# Patient Record
Sex: MALE | Race: BLACK OR AFRICAN AMERICAN | NOT HISPANIC OR LATINO | Employment: FULL TIME | URBAN - METROPOLITAN AREA
[De-identification: names, ages, dates, MRNs, and addresses within clinical notes are randomized per-mention and may not be internally consistent; named-entity substitution may affect disease eponyms.]

---

## 2017-02-07 DIAGNOSIS — N52.9 ERECTILE DYSFUNCTION, UNSPECIFIED ERECTILE DYSFUNCTION TYPE: ICD-10-CM

## 2017-02-07 DIAGNOSIS — E11.9 DIABETES MELLITUS WITHOUT COMPLICATION: ICD-10-CM

## 2017-02-07 DIAGNOSIS — I10 ESSENTIAL HYPERTENSION, BENIGN: Primary | ICD-10-CM

## 2017-02-09 RX ORDER — METFORMIN HYDROCHLORIDE 1000 MG/1
TABLET ORAL
Qty: 60 TABLET | Refills: 0 | Status: SHIPPED | OUTPATIENT
Start: 2017-02-09 | End: 2017-02-15 | Stop reason: SDUPTHER

## 2017-02-09 RX ORDER — AMLODIPINE BESYLATE 10 MG/1
10 TABLET ORAL DAILY
Qty: 30 TABLET | Refills: 0 | Status: SHIPPED | OUTPATIENT
Start: 2017-02-09 | End: 2017-02-15 | Stop reason: SDUPTHER

## 2017-02-09 RX ORDER — TADALAFIL 20 MG/1
20 TABLET ORAL DAILY
Qty: 20 TABLET | Refills: 0 | Status: SHIPPED | OUTPATIENT
Start: 2017-02-09 | End: 2017-02-15 | Stop reason: SDUPTHER

## 2017-02-14 RX ORDER — BENZONATATE 200 MG/1
CAPSULE ORAL
COMMUNITY
Start: 2017-02-07 | End: 2017-02-15

## 2017-02-15 ENCOUNTER — OFFICE VISIT (OUTPATIENT)
Dept: INTERNAL MEDICINE | Facility: CLINIC | Age: 57
End: 2017-02-15
Payer: COMMERCIAL

## 2017-02-15 VITALS
HEART RATE: 98 BPM | DIASTOLIC BLOOD PRESSURE: 84 MMHG | BODY MASS INDEX: 31.85 KG/M2 | HEIGHT: 70 IN | TEMPERATURE: 99 F | SYSTOLIC BLOOD PRESSURE: 120 MMHG | WEIGHT: 222.44 LBS

## 2017-02-15 DIAGNOSIS — N52.9 ERECTILE DYSFUNCTION, UNSPECIFIED ERECTILE DYSFUNCTION TYPE: ICD-10-CM

## 2017-02-15 DIAGNOSIS — E11.9 DIABETES MELLITUS WITHOUT COMPLICATION: ICD-10-CM

## 2017-02-15 DIAGNOSIS — J01.10 ACUTE NON-RECURRENT FRONTAL SINUSITIS: Primary | ICD-10-CM

## 2017-02-15 DIAGNOSIS — I10 ESSENTIAL HYPERTENSION, BENIGN: ICD-10-CM

## 2017-02-15 PROCEDURE — 99214 OFFICE O/P EST MOD 30 MIN: CPT | Mod: S$GLB,,, | Performed by: INTERNAL MEDICINE

## 2017-02-15 PROCEDURE — 99999 PR PBB SHADOW E&M-EST. PATIENT-LVL III: CPT | Mod: PBBFAC,,, | Performed by: INTERNAL MEDICINE

## 2017-02-15 PROCEDURE — 3045F PR MOST RECENT HEMOGLOBIN A1C LEVEL 7.0-9.0%: CPT | Mod: S$GLB,,, | Performed by: INTERNAL MEDICINE

## 2017-02-15 PROCEDURE — 3074F SYST BP LT 130 MM HG: CPT | Mod: S$GLB,,, | Performed by: INTERNAL MEDICINE

## 2017-02-15 PROCEDURE — 3060F POS MICROALBUMINURIA REV: CPT | Mod: S$GLB,,, | Performed by: INTERNAL MEDICINE

## 2017-02-15 PROCEDURE — 3079F DIAST BP 80-89 MM HG: CPT | Mod: S$GLB,,, | Performed by: INTERNAL MEDICINE

## 2017-02-15 RX ORDER — TADALAFIL 20 MG/1
20 TABLET ORAL EVERY OTHER DAY
Qty: 20 TABLET | Refills: 12 | Status: SHIPPED | OUTPATIENT
Start: 2017-02-15 | End: 2021-04-21

## 2017-02-15 RX ORDER — AMLODIPINE BESYLATE 10 MG/1
10 TABLET ORAL DAILY
Qty: 90 TABLET | Refills: 3 | Status: SHIPPED | OUTPATIENT
Start: 2017-02-15 | End: 2018-03-16 | Stop reason: SDUPTHER

## 2017-02-15 RX ORDER — METFORMIN HYDROCHLORIDE 1000 MG/1
TABLET ORAL
Qty: 180 TABLET | Refills: 3 | Status: SHIPPED | OUTPATIENT
Start: 2017-02-15 | End: 2018-03-16 | Stop reason: SDUPTHER

## 2017-02-15 RX ORDER — AMOXICILLIN 875 MG/1
875 TABLET, FILM COATED ORAL 2 TIMES DAILY
Qty: 20 TABLET | Refills: 0 | Status: SHIPPED | OUTPATIENT
Start: 2017-02-15 | End: 2017-02-25

## 2017-02-20 NOTE — PROGRESS NOTES
Subjective:       Patient ID: Michael Slaughter Jr. is a 56 y.o. male.    Chief Complaint: Annual Exam; sneezing; Cough; and Dizziness    Cough   This is a new problem. The current episode started 1 to 4 weeks ago. The problem has been waxing and waning. The problem occurs constantly. The cough is productive of sputum. Associated symptoms include nasal congestion, postnasal drip and rhinorrhea. Pertinent negatives include no shortness of breath or wheezing.   Diabetes   He presents for his follow-up diabetic visit. He has type 2 diabetes mellitus. His disease course has been stable. There are no hypoglycemic associated symptoms. There are no diabetic associated symptoms. Symptoms are stable.     Review of Systems   HENT: Positive for postnasal drip and rhinorrhea.    Respiratory: Positive for cough. Negative for shortness of breath and wheezing.        Objective:      Physical Exam   Constitutional: He is oriented to person, place, and time. He appears well-developed and well-nourished. No distress.   HENT:   Head: Normocephalic and atraumatic.   Mouth/Throat: Posterior oropharyngeal edema and posterior oropharyngeal erythema present.   Eyes: Conjunctivae are normal. Pupils are equal, round, and reactive to light.   Neck: Normal range of motion. Neck supple.   Cardiovascular: Normal rate, regular rhythm and normal heart sounds.    Pulmonary/Chest: Effort normal and breath sounds normal. He has no wheezes.   Abdominal: Soft. Bowel sounds are normal. There is no tenderness.   Musculoskeletal: Normal range of motion. He exhibits no edema or tenderness.   Neurological: He is alert and oriented to person, place, and time. No cranial nerve deficit.   Skin: No erythema.   Psychiatric: He has a normal mood and affect.   Vitals reviewed.      Assessment:       1. Acute non-recurrent frontal sinusitis    2. Essential hypertension, benign    3. Diabetes mellitus without complication    4. Erectile dysfunction, unspecified  erectile dysfunction type        Plan:       Michael was seen today for annual exam, sneezing, cough and dizziness.    Diagnoses and all orders for this visit:    Acute non-recurrent frontal sinusitis  -     amoxicillin (AMOXIL) 875 MG tablet; Take 1 tablet (875 mg total) by mouth 2 (two) times daily.    Essential hypertension, benign  -     amlodipine (NORVASC) 10 MG tablet; Take 1 tablet (10 mg total) by mouth once daily.    Diabetes mellitus without complication  -     metformin (GLUCOPHAGE) 1000 MG tablet; TAKE ONE TABLET TWICE DAILY WITH MEALS  -     CBC auto differential; Future  -     Comprehensive metabolic panel; Future  -     Hemoglobin A1c; Future  -     Lipid panel; Future    Erectile dysfunction, unspecified erectile dysfunction type  -     tadalafil (CIALIS) 20 MG Tab; Take 1 tablet (20 mg total) by mouth every other day.        Return in about 4 months (around 6/15/2017) for F/U APPOINTMENT WITH ME, WITH LAB BEFORE.

## 2017-06-19 DIAGNOSIS — E11.9 TYPE 2 DIABETES MELLITUS WITHOUT COMPLICATION: ICD-10-CM

## 2017-08-15 ENCOUNTER — LAB VISIT (OUTPATIENT)
Dept: LAB | Facility: HOSPITAL | Age: 57
End: 2017-08-15
Attending: INTERNAL MEDICINE
Payer: COMMERCIAL

## 2017-08-15 DIAGNOSIS — E11.9 DIABETES MELLITUS WITHOUT COMPLICATION: ICD-10-CM

## 2017-08-15 DIAGNOSIS — I10 ESSENTIAL HYPERTENSION, BENIGN: ICD-10-CM

## 2017-08-15 LAB
ALBUMIN SERPL BCP-MCNC: 3.7 G/DL
ALP SERPL-CCNC: 51 U/L
ALT SERPL W/O P-5'-P-CCNC: 24 U/L
ANION GAP SERPL CALC-SCNC: 9 MMOL/L
ANION GAP SERPL CALC-SCNC: 9 MMOL/L
AST SERPL-CCNC: 14 U/L
BASOPHILS # BLD AUTO: 0.03 K/UL
BASOPHILS NFR BLD: 0.4 %
BILIRUB SERPL-MCNC: 0.9 MG/DL
BUN SERPL-MCNC: 15 MG/DL
BUN SERPL-MCNC: 15 MG/DL
CALCIUM SERPL-MCNC: 8.7 MG/DL
CALCIUM SERPL-MCNC: 8.7 MG/DL
CHLORIDE SERPL-SCNC: 103 MMOL/L
CHLORIDE SERPL-SCNC: 103 MMOL/L
CHOLEST/HDLC SERPL: 4.7 {RATIO}
CO2 SERPL-SCNC: 26 MMOL/L
CO2 SERPL-SCNC: 26 MMOL/L
CREAT SERPL-MCNC: 1 MG/DL
CREAT SERPL-MCNC: 1 MG/DL
DIFFERENTIAL METHOD: ABNORMAL
EOSINOPHIL # BLD AUTO: 0.2 K/UL
EOSINOPHIL NFR BLD: 2.5 %
ERYTHROCYTE [DISTWIDTH] IN BLOOD BY AUTOMATED COUNT: 14.1 %
EST. GFR  (AFRICAN AMERICAN): >60 ML/MIN/1.73 M^2
EST. GFR  (AFRICAN AMERICAN): >60 ML/MIN/1.73 M^2
EST. GFR  (NON AFRICAN AMERICAN): >60 ML/MIN/1.73 M^2
EST. GFR  (NON AFRICAN AMERICAN): >60 ML/MIN/1.73 M^2
ESTIMATED AVG GLUCOSE: 206 MG/DL
ESTIMATED AVG GLUCOSE: 206 MG/DL
GLUCOSE SERPL-MCNC: 204 MG/DL
GLUCOSE SERPL-MCNC: 204 MG/DL
HBA1C MFR BLD HPLC: 8.8 %
HBA1C MFR BLD HPLC: 8.8 %
HCT VFR BLD AUTO: 43.6 %
HDL/CHOLESTEROL RATIO: 21.5 %
HDLC SERPL-MCNC: 214 MG/DL
HDLC SERPL-MCNC: 46 MG/DL
HGB BLD-MCNC: 15.1 G/DL
LDLC SERPL CALC-MCNC: 129.8 MG/DL
LYMPHOCYTES # BLD AUTO: 1.8 K/UL
LYMPHOCYTES NFR BLD: 26.9 %
MCH RBC QN AUTO: 27 PG
MCHC RBC AUTO-ENTMCNC: 34.6 G/DL
MCV RBC AUTO: 78 FL
MONOCYTES # BLD AUTO: 0.6 K/UL
MONOCYTES NFR BLD: 9.1 %
NEUTROPHILS # BLD AUTO: 4.1 K/UL
NEUTROPHILS NFR BLD: 61 %
NONHDLC SERPL-MCNC: 168 MG/DL
PLATELET # BLD AUTO: 245 K/UL
PMV BLD AUTO: 11.9 FL
POTASSIUM SERPL-SCNC: 4.1 MMOL/L
POTASSIUM SERPL-SCNC: 4.1 MMOL/L
PROT SERPL-MCNC: 7 G/DL
RBC # BLD AUTO: 5.6 M/UL
SODIUM SERPL-SCNC: 138 MMOL/L
SODIUM SERPL-SCNC: 138 MMOL/L
TRIGL SERPL-MCNC: 191 MG/DL
WBC # BLD AUTO: 6.74 K/UL

## 2017-08-15 PROCEDURE — 80053 COMPREHEN METABOLIC PANEL: CPT

## 2017-08-15 PROCEDURE — 36415 COLL VENOUS BLD VENIPUNCTURE: CPT

## 2017-08-15 PROCEDURE — 80061 LIPID PANEL: CPT

## 2017-08-15 PROCEDURE — 85025 COMPLETE CBC W/AUTO DIFF WBC: CPT

## 2017-08-15 PROCEDURE — 83036 HEMOGLOBIN GLYCOSYLATED A1C: CPT

## 2017-08-25 ENCOUNTER — PATIENT OUTREACH (OUTPATIENT)
Dept: ADMINISTRATIVE | Facility: HOSPITAL | Age: 57
End: 2017-08-25

## 2017-08-25 DIAGNOSIS — Z23 NEED FOR STREPTOCOCCUS PNEUMONIAE VACCINATION: ICD-10-CM

## 2017-08-25 DIAGNOSIS — E11.9 TYPE 2 DIABETES MELLITUS WITHOUT COMPLICATION, WITHOUT LONG-TERM CURRENT USE OF INSULIN: Primary | ICD-10-CM

## 2017-08-25 NOTE — PROGRESS NOTES
LPN CCC attempted to contact patient to coordinate scheduling an eye exam visit. VM option received. LPN, CCC provided a message, which included purpose of contact, call back request, and the LPN, CCC's name and contact information.

## 2017-08-28 ENCOUNTER — OFFICE VISIT (OUTPATIENT)
Dept: INTERNAL MEDICINE | Facility: CLINIC | Age: 57
End: 2017-08-28
Payer: COMMERCIAL

## 2017-08-28 ENCOUNTER — TELEPHONE (OUTPATIENT)
Dept: INTERNAL MEDICINE | Facility: CLINIC | Age: 57
End: 2017-08-28

## 2017-08-28 VITALS
HEIGHT: 70 IN | WEIGHT: 226.19 LBS | HEART RATE: 64 BPM | BODY MASS INDEX: 32.38 KG/M2 | DIASTOLIC BLOOD PRESSURE: 88 MMHG | SYSTOLIC BLOOD PRESSURE: 124 MMHG

## 2017-08-28 DIAGNOSIS — E78.2 MIXED HYPERLIPIDEMIA: ICD-10-CM

## 2017-08-28 DIAGNOSIS — E78.5 HYPERLIPIDEMIA, UNSPECIFIED HYPERLIPIDEMIA TYPE: Primary | ICD-10-CM

## 2017-08-28 DIAGNOSIS — E11.9 DIABETES MELLITUS WITHOUT COMPLICATION: Primary | ICD-10-CM

## 2017-08-28 DIAGNOSIS — E11.9 TYPE 2 DIABETES MELLITUS WITHOUT COMPLICATION, WITHOUT LONG-TERM CURRENT USE OF INSULIN: ICD-10-CM

## 2017-08-28 PROCEDURE — 3008F BODY MASS INDEX DOCD: CPT | Mod: S$GLB,,, | Performed by: INTERNAL MEDICINE

## 2017-08-28 PROCEDURE — 99215 OFFICE O/P EST HI 40 MIN: CPT | Mod: S$GLB,,, | Performed by: INTERNAL MEDICINE

## 2017-08-28 PROCEDURE — 99999 PR PBB SHADOW E&M-EST. PATIENT-LVL III: CPT | Mod: PBBFAC,,, | Performed by: INTERNAL MEDICINE

## 2017-08-28 PROCEDURE — 3074F SYST BP LT 130 MM HG: CPT | Mod: S$GLB,,, | Performed by: INTERNAL MEDICINE

## 2017-08-28 PROCEDURE — 3079F DIAST BP 80-89 MM HG: CPT | Mod: S$GLB,,, | Performed by: INTERNAL MEDICINE

## 2017-08-28 PROCEDURE — 82270 OCCULT BLOOD FECES: CPT | Mod: S$GLB,,, | Performed by: INTERNAL MEDICINE

## 2017-08-28 PROCEDURE — 3045F PR MOST RECENT HEMOGLOBIN A1C LEVEL 7.0-9.0%: CPT | Mod: S$GLB,,, | Performed by: INTERNAL MEDICINE

## 2017-08-28 RX ORDER — ATORVASTATIN CALCIUM 10 MG/1
10 TABLET, FILM COATED ORAL DAILY
Qty: 30 TABLET | Refills: 11 | Status: SHIPPED | OUTPATIENT
Start: 2017-08-28 | End: 2018-08-29 | Stop reason: SDUPTHER

## 2017-08-29 NOTE — PROGRESS NOTES
Subjective:       Patient ID: Michael Slaughter Jr. is a 57 y.o. male.    Chief Complaint: Diabetes; Arm Pain (itches and discoloration); Cough (after eating); and Ear Fullness    Diabetes   He presents for his follow-up diabetic visit. He has type 2 diabetes mellitus. His disease course has been stable. There are no hypoglycemic associated symptoms. Pertinent negatives for diabetes include no chest pain, no polydipsia, no polyphagia and no polyuria. Symptoms are stable. There are no diabetic complications. Current diabetic treatment includes oral agent (monotherapy). He is compliant with treatment most of the time.   Arm Pain    Pertinent negatives include no chest pain.   Cough   Pertinent negatives include no chest pain.   Ear Fullness    Associated symptoms include coughing.     Review of Systems   HENT:        Right ear muffled   Respiratory: Positive for cough.    Cardiovascular: Negative for chest pain.   Endocrine: Negative for polydipsia, polyphagia and polyuria.       Objective:      Physical Exam   Constitutional: He is oriented to person, place, and time. He appears well-developed and well-nourished. No distress.   HENT:   Head: Normocephalic and atraumatic.   Right Ear: Hearing and tympanic membrane normal.   Ears:    Mouth/Throat: Oropharynx is clear and moist.   Eyes: Conjunctivae are normal. Pupils are equal, round, and reactive to light.   Neck: Normal range of motion. Neck supple.   Cardiovascular: Normal rate, regular rhythm and normal heart sounds.    Pulmonary/Chest: Effort normal and breath sounds normal. He has no wheezes.   Abdominal: Soft. Bowel sounds are normal. There is no tenderness.   Musculoskeletal: Normal range of motion. He exhibits no edema or tenderness.   Neurological: He is alert and oriented to person, place, and time. No cranial nerve deficit.   Skin: No erythema.   Psychiatric: He has a normal mood and affect.   Vitals reviewed.      Assessment:       1. Hyperlipidemia,  unspecified hyperlipidemia type    2. Type 2 diabetes mellitus without complication, without long-term current use of insulin        Plan:       Michael was seen today for diabetes, arm pain, cough and ear fullness.    Diagnoses and all orders for this visit:    Hyperlipidemia, unspecified hyperlipidemia type  -     atorvastatin (LIPITOR) 10 MG tablet; Take 1 tablet (10 mg total) by mouth once daily.  -     Comprehensive metabolic panel; Future    Type 2 diabetes mellitus without complication, without long-term current use of insulin  -     Comprehensive metabolic panel; Future        Return in about 3 months (around 11/28/2017) for F/U APPOINTMENT WITH ME, WITH LAB BEFORE.

## 2017-11-14 ENCOUNTER — LAB VISIT (OUTPATIENT)
Dept: LAB | Facility: HOSPITAL | Age: 57
End: 2017-11-14
Payer: COMMERCIAL

## 2017-11-14 DIAGNOSIS — E78.2 MIXED HYPERLIPIDEMIA: ICD-10-CM

## 2017-11-14 DIAGNOSIS — E11.9 DIABETES MELLITUS WITHOUT COMPLICATION: ICD-10-CM

## 2017-11-14 LAB
ALBUMIN SERPL BCP-MCNC: 3.9 G/DL
ALP SERPL-CCNC: 62 U/L
ALT SERPL W/O P-5'-P-CCNC: 41 U/L
AST SERPL-CCNC: 18 U/L
BILIRUB DIRECT SERPL-MCNC: 0.4 MG/DL
BILIRUB SERPL-MCNC: 0.9 MG/DL
CHOLEST SERPL-MCNC: 148 MG/DL
CHOLEST/HDLC SERPL: 3.4 {RATIO}
ESTIMATED AVG GLUCOSE: 246 MG/DL
HBA1C MFR BLD HPLC: 10.2 %
HDLC SERPL-MCNC: 43 MG/DL
HDLC SERPL: 29.1 %
LDLC SERPL CALC-MCNC: 72.2 MG/DL
NONHDLC SERPL-MCNC: 105 MG/DL
PROT SERPL-MCNC: 7.2 G/DL
TRIGL SERPL-MCNC: 164 MG/DL

## 2017-11-14 PROCEDURE — 83036 HEMOGLOBIN GLYCOSYLATED A1C: CPT

## 2017-11-14 PROCEDURE — 80076 HEPATIC FUNCTION PANEL: CPT

## 2017-11-14 PROCEDURE — 80061 LIPID PANEL: CPT

## 2017-11-14 PROCEDURE — 36415 COLL VENOUS BLD VENIPUNCTURE: CPT

## 2017-11-21 ENCOUNTER — HOSPITAL ENCOUNTER (OUTPATIENT)
Dept: RADIOLOGY | Facility: HOSPITAL | Age: 57
Discharge: HOME | End: 2017-11-21
Attending: INTERNAL MEDICINE
Payer: COMMERCIAL

## 2017-11-21 ENCOUNTER — IMMUNIZATION (OUTPATIENT)
Dept: INTERNAL MEDICINE | Facility: CLINIC | Age: 57
End: 2017-11-21
Payer: COMMERCIAL

## 2017-11-21 ENCOUNTER — OFFICE VISIT (OUTPATIENT)
Dept: INTERNAL MEDICINE | Facility: CLINIC | Age: 57
End: 2017-11-21
Payer: COMMERCIAL

## 2017-11-21 VITALS
WEIGHT: 227.5 LBS | DIASTOLIC BLOOD PRESSURE: 82 MMHG | HEIGHT: 71 IN | BODY MASS INDEX: 31.85 KG/M2 | HEART RATE: 88 BPM | SYSTOLIC BLOOD PRESSURE: 137 MMHG

## 2017-11-21 DIAGNOSIS — R07.1 CHEST PAIN ON BREATHING: ICD-10-CM

## 2017-11-21 DIAGNOSIS — I10 ESSENTIAL HYPERTENSION: ICD-10-CM

## 2017-11-21 DIAGNOSIS — E11.9 TYPE 2 DIABETES MELLITUS WITHOUT COMPLICATION, WITHOUT LONG-TERM CURRENT USE OF INSULIN: Primary | ICD-10-CM

## 2017-11-21 PROCEDURE — 93010 ELECTROCARDIOGRAM REPORT: CPT | Mod: S$GLB,,, | Performed by: INTERNAL MEDICINE

## 2017-11-21 PROCEDURE — 93005 ELECTROCARDIOGRAM TRACING: CPT | Mod: S$GLB,,, | Performed by: INTERNAL MEDICINE

## 2017-11-21 PROCEDURE — 90686 IIV4 VACC NO PRSV 0.5 ML IM: CPT | Mod: S$GLB,,, | Performed by: INTERNAL MEDICINE

## 2017-11-21 PROCEDURE — 99999 PR PBB SHADOW E&M-EST. PATIENT-LVL III: CPT | Mod: PBBFAC,,, | Performed by: INTERNAL MEDICINE

## 2017-11-21 PROCEDURE — 90471 IMMUNIZATION ADMIN: CPT | Mod: S$GLB,,, | Performed by: INTERNAL MEDICINE

## 2017-11-21 PROCEDURE — 71020 XR CHEST PA AND LATERAL: CPT | Mod: TC

## 2017-11-21 PROCEDURE — 71020 XR CHEST PA AND LATERAL: CPT | Mod: 26,,, | Performed by: RADIOLOGY

## 2017-11-21 PROCEDURE — 99214 OFFICE O/P EST MOD 30 MIN: CPT | Mod: 25,S$GLB,, | Performed by: INTERNAL MEDICINE

## 2017-11-21 NOTE — PROGRESS NOTES
Subjective:       Patient ID: Michael Slaughter Jr. is a 57 y.o. male.    Chief Complaint: Follow-up    Diabetes   He presents for his follow-up diabetic visit. He has type 2 diabetes mellitus. His disease course has been worsening. There are no hypoglycemic associated symptoms. Associated symptoms include polydipsia and polyuria. There are no hypoglycemic complications. Symptoms are worsening. Current diabetic treatment includes oral agent (monotherapy). He is compliant with treatment some of the time.   Hyperlipidemia   This is a chronic problem. The problem is controlled. Recent lipid tests were reviewed and are normal. Current antihyperlipidemic treatment includes statins. The current treatment provides significant improvement of lipids.     Review of Systems   Respiratory: Positive for cough and chest tightness (right side pleuritic pain).    Cardiovascular: Negative for palpitations and leg swelling.   Endocrine: Positive for polydipsia and polyuria.       Objective:      Physical Exam   Constitutional: He is oriented to person, place, and time. He appears well-developed and well-nourished. No distress.   HENT:   Head: Normocephalic and atraumatic.   Mouth/Throat: Oropharynx is clear and moist.   Eyes: Conjunctivae are normal. Pupils are equal, round, and reactive to light.   Neck: Normal range of motion. Neck supple.   Cardiovascular: Normal rate, regular rhythm and normal heart sounds.    Pulmonary/Chest: Effort normal and breath sounds normal. He has no wheezes.   Abdominal: Soft. Bowel sounds are normal. There is no tenderness.   Musculoskeletal: Normal range of motion. He exhibits no edema or tenderness.   Neurological: He is alert and oriented to person, place, and time. No cranial nerve deficit.   Skin: No erythema.   Psychiatric: He has a normal mood and affect.   Vitals reviewed.      Assessment:       1. Type 2 diabetes mellitus without complication, without long-term current use of insulin    2. Chest  pain on breathing    3. Essential hypertension        Plan:       Michael was seen today for follow-up.    Diagnoses and all orders for this visit:    Type 2 diabetes mellitus without complication, without long-term current use of insulin  -     Discontinue: SITagliptin (JANUVIA) 50 MG Tab; Take 1 tablet (50 mg total) by mouth once daily.  -     Basic metabolic panel; Future  -     Hemoglobin A1c; Future    Chest pain on breathing  -     X-Ray Chest PA And Lateral; Future  -     EKG 12-lead; Future  -     EKG 12-lead    Essential hypertension        Return in about 3 months (around 2/21/2018) for F/U APPOINTMENT WITH ME, WITH LAB BEFORE.

## 2017-11-27 ENCOUNTER — TELEPHONE (OUTPATIENT)
Dept: INTERNAL MEDICINE | Facility: CLINIC | Age: 57
End: 2017-11-27

## 2017-11-27 RX ORDER — ATORVASTATIN CALCIUM 10 MG/1
10 TABLET, FILM COATED ORAL DAILY
Refills: 11 | COMMUNITY
Start: 2017-11-17 | End: 2017-11-27 | Stop reason: SDUPTHER

## 2017-11-27 RX ORDER — GLIMEPIRIDE 1 MG/1
1 TABLET ORAL
Qty: 90 TABLET | Refills: 3 | Status: SHIPPED | OUTPATIENT
Start: 2017-11-27 | End: 2018-07-16 | Stop reason: SDUPTHER

## 2017-11-27 RX ORDER — ATORVASTATIN CALCIUM 10 MG/1
TABLET, FILM COATED ORAL
COMMUNITY
Start: 2017-11-17 | End: 2017-11-27 | Stop reason: SDUPTHER

## 2017-11-27 NOTE — TELEPHONE ENCOUNTER
----- Message from Ericka Hawthorne sent at 11/27/2017  9:18 AM CST -----  Contact: Mi [phone]  Mi with [facility] states the pt insurance does not cover JANUVIA and Tradjenta and she would like to know if the Dr would like to change the medicine or do a prior auth,please call

## 2017-12-18 DIAGNOSIS — Z79.4 TYPE 2 DIABETES MELLITUS WITHOUT COMPLICATION, WITH LONG-TERM CURRENT USE OF INSULIN: ICD-10-CM

## 2017-12-18 DIAGNOSIS — E11.9 TYPE 2 DIABETES MELLITUS WITHOUT COMPLICATION, WITH LONG-TERM CURRENT USE OF INSULIN: ICD-10-CM

## 2017-12-18 RX ORDER — BLOOD SUGAR DIAGNOSTIC
1 STRIP MISCELLANEOUS DAILY
Qty: 50 STRIP | Refills: 6 | Status: SHIPPED | OUTPATIENT
Start: 2017-12-18 | End: 2017-12-21

## 2017-12-18 NOTE — TELEPHONE ENCOUNTER
----- Message from Lindsay Lake sent at 12/16/2017  9:27 AM CST -----  Contact: Pt  Pt needs a refill on medication     [employer] TEST STRIP Strip  Catalog number [id number]    Pt can be reached at [phone] thanks

## 2017-12-18 NOTE — TELEPHONE ENCOUNTER
----- Message from Juan Dennison sent at 12/18/2017 12:15 PM CST -----  Contact: patient  Patient called in requesting to get more of his test strips called [employer] and the catalog number is [id number]. If there is further information needed please contact patient at [phone]. Thank You.

## 2017-12-21 RX ORDER — ATORVASTATIN CALCIUM 10 MG/1
10 TABLET, FILM COATED ORAL DAILY
Refills: 11 | COMMUNITY
Start: 2017-12-14 | End: 2019-09-01 | Stop reason: SDUPTHER

## 2017-12-21 RX ORDER — INSULIN PUMP SYRINGE, 3 ML
EACH MISCELLANEOUS
Qty: 1 EACH | Refills: 0 | Status: SHIPPED | OUTPATIENT
Start: 2017-12-21 | End: 2021-05-04 | Stop reason: SDUPTHER

## 2017-12-21 RX ORDER — LANCETS
1 EACH MISCELLANEOUS ONCE
Qty: 90 EACH | Refills: 3 | Status: SHIPPED | OUTPATIENT
Start: 2017-12-21 | End: 2017-12-21

## 2017-12-21 NOTE — TELEPHONE ENCOUNTER
----- Message from Nani Hall sent at 12/21/2017  9:29 AM CST -----  Contact: pharmacy/[place]/[phone]  pharmacy called in regards to getting a Rx refill for one touch glucose meter and test strips.        [facility]  Please advise

## 2017-12-21 NOTE — TELEPHONE ENCOUNTER
----- Message from Bernadine Esposito sent at 12/21/2017  9:40 AM CST -----  Contact: self  [phone]  Patient is returning a call from the office to discuss medication ACCU-CHEK SMARTVIEW TEST STRIP Strp , stated insurance wouldn't cover medication. Patient need a PA  . Please advise , Thanks

## 2018-03-16 DIAGNOSIS — I10 ESSENTIAL HYPERTENSION, BENIGN: ICD-10-CM

## 2018-03-16 DIAGNOSIS — E11.9 DIABETES MELLITUS WITHOUT COMPLICATION: ICD-10-CM

## 2018-03-16 RX ORDER — METFORMIN HYDROCHLORIDE 1000 MG/1
TABLET ORAL
Qty: 180 TABLET | Refills: 2 | Status: SHIPPED | OUTPATIENT
Start: 2018-03-16 | End: 2019-04-04 | Stop reason: SDUPTHER

## 2018-03-16 RX ORDER — AMLODIPINE BESYLATE 10 MG/1
10 TABLET ORAL DAILY
Qty: 90 TABLET | Refills: 2 | Status: SHIPPED | OUTPATIENT
Start: 2018-03-16 | End: 2019-01-13 | Stop reason: SDUPTHER

## 2018-05-31 DIAGNOSIS — E11.9 TYPE 2 DIABETES MELLITUS WITHOUT COMPLICATION: ICD-10-CM

## 2018-06-21 ENCOUNTER — TELEPHONE (OUTPATIENT)
Dept: INTERNAL MEDICINE | Facility: CLINIC | Age: 58
End: 2018-06-21

## 2018-06-21 NOTE — TELEPHONE ENCOUNTER
----- Message from Wanda Nguyen sent at 6/21/2018  1:49 PM CDT -----  Contact: self  Pt called in about wanting to speak with nurse Azalea. Pt would like her to give him a call back in regards to this matter.       Pt can be reached at [phone]        TY

## 2018-06-27 ENCOUNTER — TELEPHONE (OUTPATIENT)
Dept: INTERNAL MEDICINE | Facility: CLINIC | Age: 58
End: 2018-06-27

## 2018-06-27 NOTE — TELEPHONE ENCOUNTER
----- Message from Arturo Tan sent at 6/27/2018  8:12 AM CDT -----  Contact: Patient [phone]  Patient is returning a phone call.  Who left a message for the patient: Renea  Does patient know what this is regarding:  Previous message  Comments: also patient is wanting to know if labs are needed before see , requesting a call back at [phone]    Please call and advise  Thank you

## 2018-06-27 NOTE — TELEPHONE ENCOUNTER
The pt is scheduled for a 6 mnth f/u on 7/9/18 and would like to have lab orders placed.      Please advise,

## 2018-06-29 DIAGNOSIS — E11.9 TYPE 2 DIABETES MELLITUS WITHOUT COMPLICATION, UNSPECIFIED WHETHER LONG TERM INSULIN USE: ICD-10-CM

## 2018-07-06 ENCOUNTER — TELEPHONE (OUTPATIENT)
Dept: INTERNAL MEDICINE | Facility: CLINIC | Age: 58
End: 2018-07-06

## 2018-07-06 NOTE — TELEPHONE ENCOUNTER
----- Message from Kayla Cooper sent at 7/6/2018 11:11 AM CDT -----  Contact: self/[phone]  Patient called in regards needing to talk with Dr Avery medical assistant about the labs that he was suppose to have on 07/03/18. Please check orders. Thank you!!!

## 2018-07-11 ENCOUNTER — LAB VISIT (OUTPATIENT)
Dept: LAB | Facility: HOSPITAL | Age: 58
End: 2018-07-11
Attending: INTERNAL MEDICINE
Payer: COMMERCIAL

## 2018-07-11 DIAGNOSIS — E11.9 TYPE 2 DIABETES MELLITUS WITHOUT COMPLICATION: ICD-10-CM

## 2018-07-11 DIAGNOSIS — E11.9 TYPE 2 DIABETES MELLITUS WITHOUT COMPLICATION, WITHOUT LONG-TERM CURRENT USE OF INSULIN: ICD-10-CM

## 2018-07-11 LAB
ANION GAP SERPL CALC-SCNC: 12 MMOL/L
BUN SERPL-MCNC: 12 MG/DL
CALCIUM SERPL-MCNC: 9.2 MG/DL
CHLORIDE SERPL-SCNC: 100 MMOL/L
CO2 SERPL-SCNC: 27 MMOL/L
CREAT SERPL-MCNC: 1.2 MG/DL
EST. GFR  (AFRICAN AMERICAN): >60 ML/MIN/1.73 M^2
EST. GFR  (NON AFRICAN AMERICAN): >60 ML/MIN/1.73 M^2
ESTIMATED AVG GLUCOSE: 280 MG/DL
GLUCOSE SERPL-MCNC: 249 MG/DL
HBA1C MFR BLD HPLC: 11.4 %
POTASSIUM SERPL-SCNC: 3.4 MMOL/L
SODIUM SERPL-SCNC: 139 MMOL/L

## 2018-07-11 PROCEDURE — 80048 BASIC METABOLIC PNL TOTAL CA: CPT

## 2018-07-11 PROCEDURE — 36415 COLL VENOUS BLD VENIPUNCTURE: CPT

## 2018-07-11 PROCEDURE — 83036 HEMOGLOBIN GLYCOSYLATED A1C: CPT

## 2018-07-16 ENCOUNTER — OFFICE VISIT (OUTPATIENT)
Dept: INTERNAL MEDICINE | Facility: CLINIC | Age: 58
End: 2018-07-16
Payer: COMMERCIAL

## 2018-07-16 ENCOUNTER — TELEPHONE (OUTPATIENT)
Dept: INTERNAL MEDICINE | Facility: CLINIC | Age: 58
End: 2018-07-16

## 2018-07-16 VITALS
DIASTOLIC BLOOD PRESSURE: 78 MMHG | BODY MASS INDEX: 31.68 KG/M2 | HEIGHT: 70 IN | SYSTOLIC BLOOD PRESSURE: 130 MMHG | HEART RATE: 107 BPM | TEMPERATURE: 98 F | WEIGHT: 221.31 LBS

## 2018-07-16 DIAGNOSIS — K21.9 GASTROESOPHAGEAL REFLUX DISEASE, ESOPHAGITIS PRESENCE NOT SPECIFIED: ICD-10-CM

## 2018-07-16 DIAGNOSIS — R53.83 FATIGUE, UNSPECIFIED TYPE: ICD-10-CM

## 2018-07-16 LAB
CREAT UR-MCNC: 82 MG/DL
MICROALBUMIN UR DL<=1MG/L-MCNC: 14 UG/ML
MICROALBUMIN/CREATININE RATIO: 17.1 UG/MG

## 2018-07-16 PROCEDURE — 99999 PR PBB SHADOW E&M-EST. PATIENT-LVL V: CPT | Mod: PBBFAC,,, | Performed by: NURSE PRACTITIONER

## 2018-07-16 PROCEDURE — 82043 UR ALBUMIN QUANTITATIVE: CPT

## 2018-07-16 PROCEDURE — 3075F SYST BP GE 130 - 139MM HG: CPT | Mod: CPTII,S$GLB,, | Performed by: NURSE PRACTITIONER

## 2018-07-16 PROCEDURE — 99214 OFFICE O/P EST MOD 30 MIN: CPT | Mod: S$GLB,,, | Performed by: NURSE PRACTITIONER

## 2018-07-16 PROCEDURE — 3078F DIAST BP <80 MM HG: CPT | Mod: CPTII,S$GLB,, | Performed by: NURSE PRACTITIONER

## 2018-07-16 PROCEDURE — 3046F HEMOGLOBIN A1C LEVEL >9.0%: CPT | Mod: CPTII,S$GLB,, | Performed by: NURSE PRACTITIONER

## 2018-07-16 PROCEDURE — 3008F BODY MASS INDEX DOCD: CPT | Mod: CPTII,S$GLB,, | Performed by: NURSE PRACTITIONER

## 2018-07-16 RX ORDER — GLIMEPIRIDE 4 MG/1
4 TABLET ORAL
Qty: 90 TABLET | Refills: 3 | Status: SHIPPED | OUTPATIENT
Start: 2018-07-16 | End: 2019-09-17 | Stop reason: ALTCHOICE

## 2018-07-16 RX ORDER — OMEPRAZOLE 40 MG/1
40 CAPSULE, DELAYED RELEASE ORAL EVERY MORNING
Qty: 30 CAPSULE | Refills: 1 | Status: SHIPPED | OUTPATIENT
Start: 2018-07-16 | End: 2020-09-28

## 2018-07-16 NOTE — PROGRESS NOTES
Subjective:       Patient ID: Michael Slaughter Jr. is a 57 y.o. male.    Chief Complaint: Follow-up    Pt here for management of his DM.  A1C elevated at 11.4 from 10.2.  Pt admits that he is not checking his BG at home because he is ' scared of what his number will be', he is only taking his Metformin once a day.  Last eye exam 1 year ago.  Has not seen Podiatry ever.  Pt states that he is having Polyuria and Polydipsia.  Pt also states that when he cuts his grass he has to take several breaks due to fatigue, no chest pain or SOB.        Review of Systems   Constitutional: Positive for fatigue. Negative for activity change, appetite change, chills and diaphoresis.   HENT: Negative for congestion.    Respiratory: Negative for cough and shortness of breath.    Cardiovascular: Negative for chest pain and palpitations.   Gastrointestinal: Negative for abdominal pain, constipation, diarrhea and nausea.   Endocrine: Positive for polydipsia and polyuria.   Genitourinary: Negative for difficulty urinating.   Musculoskeletal: Negative for arthralgias and myalgias.   Skin: Negative for rash.   Neurological: Negative for dizziness and headaches.   Hematological: Negative for adenopathy.   Psychiatric/Behavioral: Negative for sleep disturbance.         Past Medical History:   Diagnosis Date    Diabetes mellitus type II     GERD (gastroesophageal reflux disease)     Hypertension     Sinusitis      No past surgical history on file.  Social History     Social History Narrative    No narrative on file     Family History   Problem Relation Age of Onset    Cancer Mother         breast    Birth defects Maternal Grandmother         brain tumor    Heart disease Neg Hx     Stroke Neg Hx      Outpatient Encounter Prescriptions as of 7/16/2018   Medication Sig Dispense Refill    amLODIPine (NORVASC) 10 MG tablet TAKE 1 TABLET (10 MG TOTAL) BY MOUTH ONCE DAILY. 90 tablet 2    aspirin 81 MG Chew Take 1 tablet (81 mg total) by mouth  "once daily.  0    atorvastatin (LIPITOR) 10 MG tablet Take 10 mg by mouth once daily.  11    blood sugar diagnostic Strp 1 strip by Misc.(Non-Drug; Combo Route) route once. Whatever insurance allows 90 strip 3    blood-glucose meter kit Use as instructed     whatever insurance allows 1 each 0    glimepiride (AMARYL) 4 MG tablet Take 1 tablet (4 mg total) by mouth before breakfast. 90 tablet 3    metFORMIN (GLUCOPHAGE) 1000 MG tablet TAKE ONE TABLET TWICE DAILY WITH MEALS 180 tablet 2    ONETOUCH DELICA LANCETS 30 gauge Misc       tadalafil (CIALIS) 20 MG Tab Take 1 tablet (20 mg total) by mouth every other day. 20 tablet 12    [DISCONTINUED] glimepiride (AMARYL) 1 MG tablet Take 1 tablet (1 mg total) by mouth before breakfast. 90 tablet 3    omeprazole (PRILOSEC) 40 MG capsule Take 1 capsule (40 mg total) by mouth every morning. 30 capsule 1     No facility-administered encounter medications on file as of 7/16/2018.      Last 3 sets of Vitals  Vitals - 1 value per visit 8/28/2017 11/21/2017 7/16/2018   SYSTOLIC 124 137 130   DIASTOLIC 88 82 78   PULSE 64 88 107   TEMPERATURE - - 97.8   RESPIRATIONS - - -   SPO2 - - -   Weight (lb) 226.19 227.52 221.34   Weight (kg) 102.6 103.2 100.4   HEIGHT 5' 10" 5' 11" 5' 10"   BODY MASS INDEX 32.46 31.73 31.76   VISIT REPORT - - -   Pain Score  0 0 0         Objective:      Physical Exam   Constitutional: He is oriented to person, place, and time. He appears well-developed and well-nourished. No distress.   HENT:   Head: Normocephalic and atraumatic.   Pulmonary/Chest: Effort normal.   Neurological: He is alert and oriented to person, place, and time.   Skin: Skin is warm and dry. Capillary refill takes less than 2 seconds. He is not diaphoretic.   Psychiatric: He has a normal mood and affect. His behavior is normal. Judgment and thought content normal.   Nursing note and vitals reviewed.          Lab Results   Component Value Date    WBC 6.74 08/15/2017    RBC 5.60 " 08/15/2017    HGB 15.1 08/15/2017    HCT 43.6 08/15/2017    MCV 78 (L) 08/15/2017    MCH 27.0 08/15/2017    MCHC 34.6 08/15/2017    RDW 14.1 08/15/2017     08/15/2017    MPV 11.9 08/15/2017    GRAN 4.1 08/15/2017    GRAN 61.0 08/15/2017    LYMPH 1.8 08/15/2017    LYMPH 26.9 08/15/2017    MONO 0.6 08/15/2017    MONO 9.1 08/15/2017    EOS 0.2 08/15/2017    BASO 0.03 08/15/2017    EOSINOPHIL 2.5 08/15/2017    BASOPHIL 0.4 08/15/2017     Lab Results   Component Value Date    WBC 6.74 08/15/2017    HGB 15.1 08/15/2017    HCT 43.6 08/15/2017     08/15/2017    CHOL 148 11/14/2017    TRIG 164 (H) 11/14/2017    HDL 43 11/14/2017    ALT 41 11/14/2017    AST 18 11/14/2017     07/11/2018    K 3.4 (L) 07/11/2018     07/11/2018    CREATININE 1.2 07/11/2018    BUN 12 07/11/2018    CO2 27 07/11/2018    TSH 1.271 05/18/2012    PSA 0.58 06/08/2016    HGBA1C 11.4 (H) 07/11/2018       Assessment:       1. Uncontrolled type 2 diabetes mellitus with complication, without long-term current use of insulin    2. Gastroesophageal reflux disease, esophagitis presence not specified    3. Fatigue, unspecified type        Plan:       Long discussion with pt about end organ damage 2/2 DM and need for him to take his medications as directed, check his BG at home fasting and post prandial and to try to exercise daily.  Also d/w pt increased risk of CVD 2/2 DM.    Consult placed to Endo and Diabetes Education  Pt to f/u in 3 months, labs prior already ordered    ADA STANDARDS of CARE:  ACE inhibitor of angiotensin II receptor blocker: Cough with ACE  Statin drug: Atorvastatin 10 mg LDL 72  Low dose ASA: 81 mg   Eye exam within last year: ordered today  Dental exam: discussed need to have every 6 months  Flu shot: 11/2017  Microalbumin: 7/16/2018  Documentation of foot exam:Podiatry consult ordered today      Michael was seen today for follow-up.    Diagnoses and all orders for this visit:    Uncontrolled type 2 diabetes  mellitus with complication, without long-term current use of insulin  -     glimepiride (AMARYL) 4 MG tablet; Take 1 tablet (4 mg total) by mouth before breakfast.  -     Ambulatory consult to Diabetic Education  -     Ambulatory consult to Endocrinology  -     Ambulatory Referral to Optometry  -     Ambulatory consult to Podiatry  -     Lipid panel; Future  -     Hemoglobin A1c; Future  -     Comprehensive metabolic panel; Future  -     MICROALBUMIN / CREATININE RATIO URINE    Gastroesophageal reflux disease, esophagitis presence not specified  -     omeprazole (PRILOSEC) 40 MG capsule; Take 1 capsule (40 mg total) by mouth every morning.    Fatigue, unspecified type  -     Exercise stress echo; Future    Other orders  -     Cancel: C-PEPTIDE; Future      Patient Instructions   Goal is fasting blood sugar in am 110 or less.  Blood sugar 2 HOURS AFTER A MEAL 140 or less  All diabetics should take the following unless allergic:  Baby aspirin 81 mg one daily, available over the counter, to help prevent a stroke or heart attack  A medication called an ACE inhibitor or an ARB to protect the kidneys ( Lisinopril, Losartan)  A statin ( Zocor, Mevacor, Crestor, Lipitor) even if your cholesterol is normal to help prevent a stroke or heart attack  Flu shot every year  Eye exam ever year  Dental exam every 6 months    You should be checking your blood sugar every morning before you eat or drink and then once during the day 2 HOURS AFTER A MEAL, it does not matter which meal.    Schedule with Optometry for your eye exam    Schedule with Podiatry for your foot exam    Schedule your stress echo.  I will call you with your results.    Follow up in 3 months.  Labs a few a days  prior to your appointment, I have already ordered them      Increase your Amaryl to 4 mg a day, you have 1 mg tabs at home take 4 a day in the  Morning to make 4 mg so you can use up what you have at home, I sent a new script to [facility] for the 4 mg tablets.   When you  the new tablets you will only take 1 in the  morning.    Continue to take your Metformin twice a day with meals

## 2018-07-18 ENCOUNTER — CLINICAL SUPPORT (OUTPATIENT)
Dept: DIABETES | Facility: CLINIC | Age: 58
End: 2018-07-18
Payer: COMMERCIAL

## 2018-07-18 PROCEDURE — G0108 DIAB MANAGE TRN  PER INDIV: HCPCS | Mod: S$GLB,,, | Performed by: INTERNAL MEDICINE
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
## 2018-07-19 ENCOUNTER — PATIENT MESSAGE (OUTPATIENT)
Dept: OPTOMETRY | Facility: CLINIC | Age: 58
End: 2018-07-19

## 2018-07-25 NOTE — PROGRESS NOTES
Subjective:      Patient ID: Michael Slaughter Jr. is a 57 y.o. male.    Chief Complaint:  Diabetes Mellitus (New Patient )    History of Present Illness  Michael Slaughter Jr. presents today for Evaluation & management of type 2 DM. This is his first visit with me.     Diagnosed ~ 2005.     With regards to the diabetes:  Current regimen:  Metformin 1 gm bid  Amaryl 4 mg before breakfast     Missed doses? No     3-4 times a day testing  Log reviewed: yes       Eats 3 meals a day.   Snacks none         Drinks waterr     Exercise - tried to stay active     Hypoglycemic event? None   Knows how to correct with 15 grams of carbs- juice, coke, or a peppermint.      Education - last visit: 07/18/2018     Diabetes Management Status  Statin: Taking  ACE/ARB: Not taking  Screening or Prevention Patient's value Goal Complete/Controlled?   HgA1C Testing and Control   Lab Results   Component Value Date    HGBA1C 11.4 (H) 07/11/2018      Annually/Less than 8% No   Lipid profile : 11/14/2017 Annually Yes   LDL control Lab Results   Component Value Date    LDLCALC 72.2 11/14/2017    Annually/Less than 100 mg/dl  Yes   Nephropathy screening Lab Results   Component Value Date    LABMICR 14.0 07/16/2018     Lab Results   Component Value Date    PROTEINUA Negative 09/18/2009    Annually Yes   Blood pressure BP Readings from Last 1 Encounters:   07/30/18 120/82    Less than 140/90 Yes   Dilated retinal exam : 06/08/2016 eye exam today  Annually Yes   Foot exam   : 05/23/2016 Annually No       Review of Systems   Constitutional: Negative for fatigue and unexpected weight change.   Eyes: Negative for visual disturbance.   Respiratory: Negative for cough and shortness of breath.    Cardiovascular: Negative for chest pain.   Gastrointestinal: Negative for abdominal pain.   Endocrine: Positive for polyuria (has improved ). Negative for cold intolerance, heat intolerance, polydipsia and polyphagia.   Musculoskeletal: Negative for arthralgias.    Skin: Negative for wound.   Neurological: Negative for headaches.   Hematological: Does not bruise/bleed easily.   Psychiatric/Behavioral: Negative for sleep disturbance.     Objective:   Physical Exam   Constitutional: He appears well-developed.   HENT:   Right Ear: External ear normal.   Left Ear: External ear normal.   Nose: Nose normal.   Hearing Normal     Neck: No tracheal deviation present. No thyromegaly present.   Cardiovascular: Normal rate.    No murmur heard.  No edema present   Pulmonary/Chest: Effort normal and breath sounds normal.   Abdominal: Soft. He exhibits no mass. No hernia.   Neurological: He is alert. No cranial nerve deficit or sensory deficit.   Skin: No rash noted.   No nodules.   Psychiatric: He has a normal mood and affect. Judgment normal.   Vitals reviewed.  Appropriate footwear, Foot exam deferred per patient.    Body mass index is 30.19 kg/m².    Lab Review:   Lab Results   Component Value Date    HGBA1C 11.4 (H) 07/11/2018     Lab Results   Component Value Date    CHOL 148 11/14/2017    HDL 43 11/14/2017    LDLCALC 72.2 11/14/2017    TRIG 164 (H) 11/14/2017    CHOLHDL 29.1 11/14/2017     Lab Results   Component Value Date     07/11/2018    K 3.4 (L) 07/11/2018     07/11/2018    CO2 27 07/11/2018     (H) 07/11/2018    BUN 12 07/11/2018    CREATININE 1.2 07/11/2018    CALCIUM 9.2 07/11/2018    PROT 7.2 11/14/2017    ALBUMIN 3.9 11/14/2017    BILITOT 0.9 11/14/2017    ALKPHOS 62 11/14/2017    AST 18 11/14/2017    ALT 41 11/14/2017    ANIONGAP 12 07/11/2018    ESTGFRAFRICA >60 07/11/2018    EGFRNONAA >60 07/11/2018    TSH 1.271 05/18/2012     Assessment and Plan     1. Type 2 diabetes mellitus without complication, without long-term current use of insulin  Hemoglobin A1c    Comprehensive metabolic panel    Microalbumin/creatinine urine ratio    Lipid panel   2. Essential hypertension         Diabetes mellitus, type 2  -- Reviewed goals of therapy are to get the best  control we can without hypoglycemia  Continue metformin & amaryl daily with breakfast.   -- Advised frequent self blood glucose monitoring.  Patient encouraged to document glucose results and bring them to every clinic visit    -- Hypoglycemia precautions discussed. Instructed on precautions before driving.    -- Call for Bg repeatedly < 90 or > 180.   -- Close adherence to lifestyle changes recommended.   -- Periodic follow ups for eye evaluations, foot care and dental care suggested.    Hypertension  -- Continue current medications   -- Controlled  -- Blood pressure goals discussed with patient      Follow-up in about 3 months (around 10/30/2018).    Case discussed with Dr. Lynne   Recommendations were discussed with the patient in detail  The patient verbalized understanding and agrees with the plan outlined as above.

## 2018-07-30 ENCOUNTER — OFFICE VISIT (OUTPATIENT)
Dept: OPTOMETRY | Facility: CLINIC | Age: 58
End: 2018-07-30
Payer: COMMERCIAL

## 2018-07-30 ENCOUNTER — OFFICE VISIT (OUTPATIENT)
Dept: ENDOCRINOLOGY | Facility: CLINIC | Age: 58
End: 2018-07-30
Payer: COMMERCIAL

## 2018-07-30 VITALS
DIASTOLIC BLOOD PRESSURE: 82 MMHG | BODY MASS INDEX: 30.31 KG/M2 | WEIGHT: 216.5 LBS | HEIGHT: 71 IN | SYSTOLIC BLOOD PRESSURE: 120 MMHG | HEART RATE: 88 BPM

## 2018-07-30 DIAGNOSIS — I10 ESSENTIAL HYPERTENSION: ICD-10-CM

## 2018-07-30 DIAGNOSIS — H25.13 NS (NUCLEAR SCLEROSIS), BILATERAL: ICD-10-CM

## 2018-07-30 DIAGNOSIS — H18.413 ARCUS SENILIS OF BOTH CORNEAS: ICD-10-CM

## 2018-07-30 DIAGNOSIS — E11.9 TYPE 2 DIABETES MELLITUS WITHOUT COMPLICATION, WITHOUT LONG-TERM CURRENT USE OF INSULIN: Primary | ICD-10-CM

## 2018-07-30 DIAGNOSIS — H52.4 PRESBYOPIA: ICD-10-CM

## 2018-07-30 PROCEDURE — 99999 PR PBB SHADOW E&M-EST. PATIENT-LVL II: CPT | Mod: PBBFAC,,, | Performed by: OPTOMETRIST

## 2018-07-30 PROCEDURE — 3074F SYST BP LT 130 MM HG: CPT | Mod: CPTII,S$GLB,, | Performed by: NURSE PRACTITIONER

## 2018-07-30 PROCEDURE — 99203 OFFICE O/P NEW LOW 30 MIN: CPT | Mod: S$GLB,,, | Performed by: NURSE PRACTITIONER

## 2018-07-30 PROCEDURE — 92015 DETERMINE REFRACTIVE STATE: CPT | Mod: S$GLB,,, | Performed by: OPTOMETRIST

## 2018-07-30 PROCEDURE — 3008F BODY MASS INDEX DOCD: CPT | Mod: CPTII,S$GLB,, | Performed by: NURSE PRACTITIONER

## 2018-07-30 PROCEDURE — 92014 COMPRE OPH EXAM EST PT 1/>: CPT | Mod: S$GLB,,, | Performed by: OPTOMETRIST

## 2018-07-30 PROCEDURE — 3046F HEMOGLOBIN A1C LEVEL >9.0%: CPT | Mod: CPTII,S$GLB,, | Performed by: NURSE PRACTITIONER

## 2018-07-30 PROCEDURE — 3079F DIAST BP 80-89 MM HG: CPT | Mod: CPTII,S$GLB,, | Performed by: NURSE PRACTITIONER

## 2018-07-30 PROCEDURE — 99999 PR PBB SHADOW E&M-EST. PATIENT-LVL III: CPT | Mod: PBBFAC,,, | Performed by: NURSE PRACTITIONER

## 2018-07-30 NOTE — PROGRESS NOTES
HPI     57yr old male present for Annual NIDDM DFE. Patient states last BSL   normal range, since he changed his diet. Hemoglobin A1C       Date                     Value               Ref Range             Status                07/11/2018               11.4 (H)            4.0 - 5.6 %           Final            Patient vision blurry OU lately at distance when trying to read something.   Pt seeing floaters on occasion when he cough, denies flashes of light. Pt   see glare when driving at night sometimes. He notice the white lines are   double when driving during the daytime.     Last edited by Victor Manuel Gentile on 7/30/2018 10:07 AM. (History)            Assessment /Plan     For exam results, see Encounter Report.          DM type 2 without retinopathy  Essential hypertension               No retinopathy, monitor yearly    Arcus senilis of both corneas   NS (nuclear sclerosis), bilateral               Mild, not visually significant, monitor yearly     Presbyopia               Use OTC readers +2.00     Conjunctivitis, allergic, bilateral               Use Always or Zaditor BID/PRN OU

## 2018-07-30 NOTE — ASSESSMENT & PLAN NOTE
-- Reviewed goals of therapy are to get the best control we can without hypoglycemia  Continue metformin & amaryl daily with breakfast.   -- Advised frequent self blood glucose monitoring.  Patient encouraged to document glucose results and bring them to every clinic visit    -- Hypoglycemia precautions discussed. Instructed on precautions before driving.    -- Call for Bg repeatedly < 90 or > 180.   -- Close adherence to lifestyle changes recommended.   -- Periodic follow ups for eye evaluations, foot care and dental care suggested.

## 2018-07-30 NOTE — LETTER
July 30, 2018      Alena Carnes, FNP-C  [address] [facility]  [address]  [facility] [address]           [facility]  [address]  [facility] [address]  Phone: [phone]  Fax: [phone]          Patient: Michael Slaughter Jr.   MR Number: [id number]   YOB: 1960   Date of Visit: 7/30/2018       Dear Alena Carnes:    Thank you for referring Michael Slaughter to me for evaluation. Attached you will find relevant portions of my assessment and plan of care.    If you have questions, please do not hesitate to call me. I look forward to following Michael Slaughter along with you.    Sincerely,    Batool Rosa, OD    Enclosure  CC:  No Recipients    If you would like to receive this communication electronically, please contact [email or web address] or [phone] to request more information on [employer] Link access.    For providers and/or their staff who would like to refer a patient to [facility], please contact us through our one-stop-shop provider referral line, [facility], at [phone].    If you feel you have received this communication in error or would no longer like to receive these types of communications, please e-mail [email or web address]

## 2018-07-30 NOTE — LETTER
July 30, 2018      Alena Carnes, FNP-C  [address] [facility]  [address]  [facility] [address]           [facility] - Endocrinology  [address]  [facility] [address]  Phone: [phone]          Patient: Michael Slaughter Jr.   MR Number: [id number]   YOB: 1960   Date of Visit: 7/30/2018       Dear Alena Carnes:    Thank you for referring Michael Slaughter to me for evaluation. Attached you will find relevant portions of my assessment and plan of care.    If you have questions, please do not hesitate to call me. I look forward to following Michael Slaughter along with you.    Sincerely,    Marbella Walsh, NP    Enclosure  CC:  No Recipients    If you would like to receive this communication electronically, please contact [email or web address] or [phone] to request more information on [employer] Link access.    For providers and/or their staff who would like to refer a patient to [facility], please contact us through our one-stop-shop provider referral line, [facility] , at [phone].    If you feel you have received this communication in error or would no longer like to receive these types of communications, please e-mail [email or web address]

## 2018-08-09 ENCOUNTER — TELEPHONE (OUTPATIENT)
Dept: INTERNAL MEDICINE | Facility: CLINIC | Age: 58
End: 2018-08-09

## 2018-08-09 DIAGNOSIS — Z12.5 PROSTATE CANCER SCREENING: Primary | ICD-10-CM

## 2018-08-09 NOTE — TELEPHONE ENCOUNTER
----- Message from Arturo Tan sent at 8/9/2018 11:05 AM CDT -----  Contact: Patient [phone]  Type: Orders Request    What orders/ testing are being requested? PSA Screening     Is there a future appointment scheduled for the patient with PCP? Yes     When? 10/17/18  Comments:    Please call an advise  Thank you

## 2018-08-20 ENCOUNTER — TELEPHONE (OUTPATIENT)
Dept: INTERNAL MEDICINE | Facility: CLINIC | Age: 58
End: 2018-08-20

## 2018-08-20 ENCOUNTER — PATIENT MESSAGE (OUTPATIENT)
Dept: DIABETES | Facility: CLINIC | Age: 58
End: 2018-08-20

## 2018-08-20 ENCOUNTER — PATIENT MESSAGE (OUTPATIENT)
Dept: ENDOCRINOLOGY | Facility: CLINIC | Age: 58
End: 2018-08-20

## 2018-08-20 DIAGNOSIS — Z79.4 TYPE 2 DIABETES MELLITUS WITH HYPEROSMOLAR COMA, WITH LONG-TERM CURRENT USE OF INSULIN: Primary | ICD-10-CM

## 2018-08-20 DIAGNOSIS — E11.01 TYPE 2 DIABETES MELLITUS WITH HYPEROSMOLAR COMA, WITH LONG-TERM CURRENT USE OF INSULIN: Primary | ICD-10-CM

## 2018-08-20 NOTE — TELEPHONE ENCOUNTER
----- Message from Ricardo Franco sent at 8/20/2018  2:11 PM CDT -----  Contact: Patient [phone]  Is this a refill or new RX:  Refill    RX name and strength: blood sugar diagnostic Strps and ONETOUCH DELICA LANCETS 30 gauge Misc    Directions: He said he test 3 times daily    Is this a 30 day or 90 day RX:  90    Pharmacy name and phone # [id number] - [place] - [phone] (Phone) [phone] (Fax)      Comments:

## 2018-08-29 DIAGNOSIS — E78.5 HYPERLIPIDEMIA, UNSPECIFIED HYPERLIPIDEMIA TYPE: ICD-10-CM

## 2018-08-29 RX ORDER — ATORVASTATIN CALCIUM 10 MG/1
TABLET, FILM COATED ORAL
Qty: 30 TABLET | Refills: 10 | Status: SHIPPED | OUTPATIENT
Start: 2018-08-29 | End: 2018-10-17 | Stop reason: SDUPTHER

## 2018-10-11 ENCOUNTER — PATIENT OUTREACH (OUTPATIENT)
Dept: ADMINISTRATIVE | Facility: HOSPITAL | Age: 58
End: 2018-10-11

## 2018-10-11 NOTE — PROGRESS NOTES
Pre-visit audit complete, pt due for fasting labs.  Attempted to contact pt, left voice message requesting a return call.

## 2018-10-14 ENCOUNTER — PATIENT MESSAGE (OUTPATIENT)
Dept: INTERNAL MEDICINE | Facility: CLINIC | Age: 58
End: 2018-10-14

## 2018-10-15 ENCOUNTER — PATIENT MESSAGE (OUTPATIENT)
Dept: INTERNAL MEDICINE | Facility: CLINIC | Age: 58
End: 2018-10-15

## 2018-10-15 ENCOUNTER — TELEPHONE (OUTPATIENT)
Dept: INTERNAL MEDICINE | Facility: CLINIC | Age: 58
End: 2018-10-15

## 2018-10-15 ENCOUNTER — LAB VISIT (OUTPATIENT)
Dept: LAB | Facility: HOSPITAL | Age: 58
End: 2018-10-15
Payer: COMMERCIAL

## 2018-10-15 DIAGNOSIS — Z12.5 PROSTATE CANCER SCREENING: ICD-10-CM

## 2018-10-15 LAB
ALBUMIN SERPL BCP-MCNC: 4.3 G/DL
ALP SERPL-CCNC: 51 U/L
ALT SERPL W/O P-5'-P-CCNC: 19 U/L
ANION GAP SERPL CALC-SCNC: 11 MMOL/L
AST SERPL-CCNC: 13 U/L
BILIRUB SERPL-MCNC: 0.8 MG/DL
BUN SERPL-MCNC: 15 MG/DL
CALCIUM SERPL-MCNC: 9.6 MG/DL
CHLORIDE SERPL-SCNC: 102 MMOL/L
CHOLEST SERPL-MCNC: 133 MG/DL
CHOLEST/HDLC SERPL: 3 {RATIO}
CO2 SERPL-SCNC: 27 MMOL/L
CREAT SERPL-MCNC: 1.1 MG/DL
EST. GFR  (AFRICAN AMERICAN): >60 ML/MIN/1.73 M^2
EST. GFR  (NON AFRICAN AMERICAN): >60 ML/MIN/1.73 M^2
ESTIMATED AVG GLUCOSE: 140 MG/DL
GLUCOSE SERPL-MCNC: 94 MG/DL
HBA1C MFR BLD HPLC: 6.5 %
HDLC SERPL-MCNC: 45 MG/DL
HDLC SERPL: 33.8 %
LDLC SERPL CALC-MCNC: 67.2 MG/DL
NONHDLC SERPL-MCNC: 88 MG/DL
POTASSIUM SERPL-SCNC: 4.1 MMOL/L
PROSTATE SPECIFIC ANTIGEN, TOTAL: 0.45 NG/ML
PROT SERPL-MCNC: 7.5 G/DL
PSA FREE MFR SERPL: 46.67 %
PSA FREE SERPL-MCNC: 0.21 NG/ML
SODIUM SERPL-SCNC: 140 MMOL/L
TRIGL SERPL-MCNC: 104 MG/DL

## 2018-10-15 PROCEDURE — 80053 COMPREHEN METABOLIC PANEL: CPT

## 2018-10-15 PROCEDURE — 83036 HEMOGLOBIN GLYCOSYLATED A1C: CPT

## 2018-10-15 PROCEDURE — 80061 LIPID PANEL: CPT

## 2018-10-15 PROCEDURE — 84154 ASSAY OF PSA FREE: CPT

## 2018-10-15 PROCEDURE — 36415 COLL VENOUS BLD VENIPUNCTURE: CPT

## 2018-10-15 NOTE — TELEPHONE ENCOUNTER
----- Message from Edelmira Sanchez sent at 10/15/2018  7:15 AM CDT -----  Contact: Patient [phone]  Patient has physical scheduled for 10/17/2018 and would like to come in this morning for labs. Has been fasting and stated that he will continue to fast until he hears from you today.    Please call and advise.    Thank You

## 2018-10-17 ENCOUNTER — OFFICE VISIT (OUTPATIENT)
Dept: INTERNAL MEDICINE | Facility: CLINIC | Age: 58
End: 2018-10-17
Payer: COMMERCIAL

## 2018-10-17 ENCOUNTER — IMMUNIZATION (OUTPATIENT)
Dept: INTERNAL MEDICINE | Facility: CLINIC | Age: 58
End: 2018-10-17
Payer: COMMERCIAL

## 2018-10-17 VITALS
SYSTOLIC BLOOD PRESSURE: 134 MMHG | BODY MASS INDEX: 31.39 KG/M2 | OXYGEN SATURATION: 96 % | WEIGHT: 224.19 LBS | HEIGHT: 71 IN | HEART RATE: 93 BPM | DIASTOLIC BLOOD PRESSURE: 66 MMHG | TEMPERATURE: 98 F

## 2018-10-17 DIAGNOSIS — I10 ESSENTIAL HYPERTENSION: ICD-10-CM

## 2018-10-17 DIAGNOSIS — E11.9 TYPE 2 DIABETES MELLITUS WITHOUT COMPLICATION, WITHOUT LONG-TERM CURRENT USE OF INSULIN: Primary | ICD-10-CM

## 2018-10-17 PROCEDURE — 3044F HG A1C LEVEL LT 7.0%: CPT | Mod: CPTII,S$GLB,, | Performed by: INTERNAL MEDICINE

## 2018-10-17 PROCEDURE — 90471 IMMUNIZATION ADMIN: CPT | Mod: S$GLB,,, | Performed by: INTERNAL MEDICINE

## 2018-10-17 PROCEDURE — 99999 PR PBB SHADOW E&M-EST. PATIENT-LVL III: CPT | Mod: PBBFAC,,, | Performed by: INTERNAL MEDICINE

## 2018-10-17 PROCEDURE — 99214 OFFICE O/P EST MOD 30 MIN: CPT | Mod: S$GLB,,, | Performed by: INTERNAL MEDICINE

## 2018-10-17 PROCEDURE — 3078F DIAST BP <80 MM HG: CPT | Mod: CPTII,S$GLB,, | Performed by: INTERNAL MEDICINE

## 2018-10-17 PROCEDURE — 3075F SYST BP GE 130 - 139MM HG: CPT | Mod: CPTII,S$GLB,, | Performed by: INTERNAL MEDICINE

## 2018-10-17 PROCEDURE — 90686 IIV4 VACC NO PRSV 0.5 ML IM: CPT | Mod: S$GLB,,, | Performed by: INTERNAL MEDICINE

## 2018-10-17 PROCEDURE — 3008F BODY MASS INDEX DOCD: CPT | Mod: CPTII,S$GLB,, | Performed by: INTERNAL MEDICINE

## 2018-10-23 NOTE — PROGRESS NOTES
Subjective:       Patient ID: Michael Slaughter Jr. is a 58 y.o. male.    Chief Complaint: Follow-up    Diabetes   He presents for his follow-up diabetic visit. He has type 2 diabetes mellitus. His disease course has been stable. There are no hypoglycemic associated symptoms. Pertinent negatives for hypoglycemia include no speech difficulty. There are no diabetic associated symptoms. Pertinent negatives for diabetes include no chest pain, no fatigue and no weakness. Symptoms are stable.   Hypertension   This is a chronic problem. The problem is unchanged. The problem is controlled. Pertinent negatives include no chest pain, palpitations or shortness of breath. The current treatment provides significant improvement. There are no compliance problems.      Review of Systems   Constitutional: Negative for fatigue.   HENT: Negative for sore throat.    Eyes: Negative for visual disturbance.   Respiratory: Negative for shortness of breath.    Cardiovascular: Negative for chest pain and palpitations.   Gastrointestinal: Negative for abdominal pain.   Genitourinary: Negative for dysuria, frequency and hematuria.   Musculoskeletal: Negative for joint swelling.   Skin: Negative for rash.   Neurological: Negative for speech difficulty and weakness.   Psychiatric/Behavioral: Negative for dysphoric mood.       Objective:      Physical Exam   Constitutional: He is oriented to person, place, and time. He appears well-developed and well-nourished. No distress.   HENT:   Head: Normocephalic and atraumatic.   Mouth/Throat: Oropharynx is clear and moist.   Eyes: Conjunctivae are normal. Pupils are equal, round, and reactive to light.   Neck: Normal range of motion. Neck supple.   Cardiovascular: Normal rate, regular rhythm and normal heart sounds.   Pulmonary/Chest: Effort normal and breath sounds normal. He has no wheezes.   Abdominal: Soft. Bowel sounds are normal. There is no tenderness.   Musculoskeletal: Normal range of motion. He  exhibits no edema or tenderness.   Neurological: He is alert and oriented to person, place, and time. No cranial nerve deficit.   Skin: No erythema.   Psychiatric: He has a normal mood and affect.   Vitals reviewed.      Assessment:       1. Type 2 diabetes mellitus without complication, without long-term current use of insulin    2. Essential hypertension        Plan:       Michael was seen today for follow-up.    Diagnoses and all orders for this visit:    Type 2 diabetes mellitus without complication, without long-term current use of insulin  -     Comprehensive metabolic panel; Future  -     Lipid panel; Future  -     Hemoglobin A1c; Future    Essential hypertension        Follow-up in about 6 months (around 4/17/2019) for F/U WITH ME OR APC, WITH LAB BEFORE.

## 2019-01-13 DIAGNOSIS — I10 ESSENTIAL HYPERTENSION, BENIGN: ICD-10-CM

## 2019-01-15 RX ORDER — AMLODIPINE BESYLATE 10 MG/1
10 TABLET ORAL DAILY
Qty: 90 TABLET | Refills: 3 | Status: SHIPPED | OUTPATIENT
Start: 2019-01-15 | End: 2020-01-27 | Stop reason: SDUPTHER

## 2019-04-04 DIAGNOSIS — E11.9 DIABETES MELLITUS WITHOUT COMPLICATION: ICD-10-CM

## 2019-04-04 RX ORDER — METFORMIN HYDROCHLORIDE 1000 MG/1
TABLET ORAL
Qty: 180 TABLET | Refills: 0 | Status: SHIPPED | OUTPATIENT
Start: 2019-04-04 | End: 2019-04-28 | Stop reason: SDUPTHER

## 2019-04-28 DIAGNOSIS — E11.9 DIABETES MELLITUS WITHOUT COMPLICATION: ICD-10-CM

## 2019-04-29 RX ORDER — METFORMIN HYDROCHLORIDE 1000 MG/1
TABLET ORAL
Qty: 180 TABLET | Refills: 0 | Status: SHIPPED | OUTPATIENT
Start: 2019-04-29 | End: 2019-07-31 | Stop reason: SDUPTHER

## 2019-07-31 DIAGNOSIS — E11.9 DIABETES MELLITUS WITHOUT COMPLICATION: ICD-10-CM

## 2019-07-31 RX ORDER — METFORMIN HYDROCHLORIDE 1000 MG/1
TABLET ORAL
Qty: 60 TABLET | Refills: 2 | Status: SHIPPED | OUTPATIENT
Start: 2019-07-31 | End: 2019-09-17 | Stop reason: SINTOL

## 2019-07-31 RX ORDER — GLIMEPIRIDE 4 MG/1
TABLET ORAL
Qty: 30 TABLET | Refills: 11 | OUTPATIENT
Start: 2019-07-31

## 2019-09-02 RX ORDER — ATORVASTATIN CALCIUM 10 MG/1
TABLET, FILM COATED ORAL
Qty: 30 TABLET | Refills: 10 | Status: SHIPPED | OUTPATIENT
Start: 2019-09-02 | End: 2020-05-18 | Stop reason: SDUPTHER

## 2019-09-10 ENCOUNTER — TELEPHONE (OUTPATIENT)
Dept: INTERNAL MEDICINE | Facility: CLINIC | Age: 59
End: 2019-09-10

## 2019-09-10 NOTE — TELEPHONE ENCOUNTER
----- Message from Larisa Sewell sent at 9/10/2019  4:29 PM CDT -----  Contact: [phone]/self  Patient is requesting orders for lab work. [facility]. Please advise.

## 2019-09-13 ENCOUNTER — LAB VISIT (OUTPATIENT)
Dept: LAB | Facility: HOSPITAL | Age: 59
End: 2019-09-13
Attending: INTERNAL MEDICINE
Payer: COMMERCIAL

## 2019-09-13 DIAGNOSIS — E11.9 TYPE 2 DIABETES MELLITUS WITHOUT COMPLICATION, WITHOUT LONG-TERM CURRENT USE OF INSULIN: ICD-10-CM

## 2019-09-13 DIAGNOSIS — E78.5 HYPERLIPIDEMIA, UNSPECIFIED HYPERLIPIDEMIA TYPE: ICD-10-CM

## 2019-09-13 LAB
ALBUMIN SERPL BCP-MCNC: 4.1 G/DL (ref 3.5–5.2)
ALBUMIN SERPL BCP-MCNC: 4.1 G/DL (ref 3.5–5.2)
ALP SERPL-CCNC: 56 U/L (ref 55–135)
ALP SERPL-CCNC: 56 U/L (ref 55–135)
ALT SERPL W/O P-5'-P-CCNC: 24 U/L (ref 10–44)
ALT SERPL W/O P-5'-P-CCNC: 24 U/L (ref 10–44)
ANION GAP SERPL CALC-SCNC: 12 MMOL/L (ref 8–16)
ANION GAP SERPL CALC-SCNC: 12 MMOL/L (ref 8–16)
AST SERPL-CCNC: 12 U/L (ref 10–40)
AST SERPL-CCNC: 12 U/L (ref 10–40)
BILIRUB SERPL-MCNC: 0.8 MG/DL (ref 0.1–1)
BILIRUB SERPL-MCNC: 0.8 MG/DL (ref 0.1–1)
BUN SERPL-MCNC: 14 MG/DL (ref 8–23)
BUN SERPL-MCNC: 14 MG/DL (ref 8–23)
CALCIUM SERPL-MCNC: 9.5 MG/DL (ref 8.7–10.5)
CALCIUM SERPL-MCNC: 9.5 MG/DL (ref 8.7–10.5)
CHLORIDE SERPL-SCNC: 101 MMOL/L (ref 95–110)
CHLORIDE SERPL-SCNC: 101 MMOL/L (ref 95–110)
CHOLEST SERPL-MCNC: 137 MG/DL (ref 120–199)
CHOLEST/HDLC SERPL: 3.4 {RATIO} (ref 2–5)
CO2 SERPL-SCNC: 25 MMOL/L (ref 23–29)
CO2 SERPL-SCNC: 25 MMOL/L (ref 23–29)
CREAT SERPL-MCNC: 1.1 MG/DL (ref 0.5–1.4)
CREAT SERPL-MCNC: 1.1 MG/DL (ref 0.5–1.4)
EST. GFR  (AFRICAN AMERICAN): >60 ML/MIN/1.73 M^2
EST. GFR  (AFRICAN AMERICAN): >60 ML/MIN/1.73 M^2
EST. GFR  (NON AFRICAN AMERICAN): >60 ML/MIN/1.73 M^2
EST. GFR  (NON AFRICAN AMERICAN): >60 ML/MIN/1.73 M^2
ESTIMATED AVG GLUCOSE: 203 MG/DL (ref 68–131)
GLUCOSE SERPL-MCNC: 206 MG/DL (ref 70–110)
GLUCOSE SERPL-MCNC: 206 MG/DL (ref 70–110)
HBA1C MFR BLD HPLC: 8.7 % (ref 4–5.6)
HDLC SERPL-MCNC: 40 MG/DL (ref 40–75)
HDLC SERPL: 29.2 % (ref 20–50)
LDLC SERPL CALC-MCNC: 74.6 MG/DL (ref 63–159)
NONHDLC SERPL-MCNC: 97 MG/DL
POTASSIUM SERPL-SCNC: 4.4 MMOL/L (ref 3.5–5.1)
POTASSIUM SERPL-SCNC: 4.4 MMOL/L (ref 3.5–5.1)
PROT SERPL-MCNC: 7.1 G/DL (ref 6–8.4)
PROT SERPL-MCNC: 7.1 G/DL (ref 6–8.4)
SODIUM SERPL-SCNC: 138 MMOL/L (ref 136–145)
SODIUM SERPL-SCNC: 138 MMOL/L (ref 136–145)
TRIGL SERPL-MCNC: 112 MG/DL (ref 30–150)

## 2019-09-13 PROCEDURE — 80053 COMPREHEN METABOLIC PANEL: CPT

## 2019-09-13 PROCEDURE — 83036 HEMOGLOBIN GLYCOSYLATED A1C: CPT

## 2019-09-13 PROCEDURE — 80061 LIPID PANEL: CPT

## 2019-09-13 PROCEDURE — 36415 COLL VENOUS BLD VENIPUNCTURE: CPT

## 2019-09-17 ENCOUNTER — LAB VISIT (OUTPATIENT)
Dept: LAB | Facility: HOSPITAL | Age: 59
End: 2019-09-17
Attending: NURSE PRACTITIONER
Payer: COMMERCIAL

## 2019-09-17 ENCOUNTER — CLINICAL SUPPORT (OUTPATIENT)
Dept: INTERNAL MEDICINE | Facility: CLINIC | Age: 59
End: 2019-09-17
Payer: COMMERCIAL

## 2019-09-17 ENCOUNTER — OFFICE VISIT (OUTPATIENT)
Dept: INTERNAL MEDICINE | Facility: CLINIC | Age: 59
End: 2019-09-17
Payer: COMMERCIAL

## 2019-09-17 ENCOUNTER — CLINICAL SUPPORT (OUTPATIENT)
Dept: OPTOMETRY | Facility: CLINIC | Age: 59
End: 2019-09-17
Attending: NURSE PRACTITIONER
Payer: COMMERCIAL

## 2019-09-17 VITALS
HEART RATE: 85 BPM | DIASTOLIC BLOOD PRESSURE: 74 MMHG | SYSTOLIC BLOOD PRESSURE: 112 MMHG | BODY MASS INDEX: 32.06 KG/M2 | WEIGHT: 229 LBS | HEIGHT: 71 IN

## 2019-09-17 DIAGNOSIS — I10 ESSENTIAL HYPERTENSION: ICD-10-CM

## 2019-09-17 DIAGNOSIS — Z01.89 ROUTINE LAB DRAW: ICD-10-CM

## 2019-09-17 DIAGNOSIS — E11.9 DIABETES MELLITUS WITHOUT COMPLICATION: ICD-10-CM

## 2019-09-17 DIAGNOSIS — Z23 NEED FOR INFLUENZA VACCINATION: ICD-10-CM

## 2019-09-17 DIAGNOSIS — Z00.00 ENCOUNTER FOR HEALTH MAINTENANCE EXAMINATION IN ADULT: Primary | ICD-10-CM

## 2019-09-17 DIAGNOSIS — Z23 NEED FOR TDAP VACCINATION: ICD-10-CM

## 2019-09-17 DIAGNOSIS — Z23 NEED FOR 23-POLYVALENT PNEUMOCOCCAL POLYSACCHARIDE VACCINE: ICD-10-CM

## 2019-09-17 DIAGNOSIS — R53.83 MALAISE AND FATIGUE: ICD-10-CM

## 2019-09-17 DIAGNOSIS — R53.81 MALAISE AND FATIGUE: ICD-10-CM

## 2019-09-17 DIAGNOSIS — E66.01 CLASS 2 SEVERE OBESITY WITH SERIOUS COMORBIDITY IN ADULT, UNSPECIFIED BMI, UNSPECIFIED OBESITY TYPE: ICD-10-CM

## 2019-09-17 DIAGNOSIS — Z23 NEED FOR SHINGLES VACCINE: ICD-10-CM

## 2019-09-17 LAB
ALBUMIN/CREAT UR: 13.3 UG/MG (ref 0–30)
BACTERIA #/AREA URNS AUTO: NORMAL /HPF
BASOPHILS # BLD AUTO: 0.05 K/UL (ref 0–0.2)
BASOPHILS NFR BLD: 0.5 % (ref 0–1.9)
BILIRUB UR QL STRIP: NEGATIVE
CLARITY UR REFRACT.AUTO: CLEAR
COLOR UR AUTO: YELLOW
CREAT UR-MCNC: 165 MG/DL (ref 23–375)
DIFFERENTIAL METHOD: ABNORMAL
EOSINOPHIL # BLD AUTO: 0.8 K/UL (ref 0–0.5)
EOSINOPHIL NFR BLD: 8.1 % (ref 0–8)
ERYTHROCYTE [DISTWIDTH] IN BLOOD BY AUTOMATED COUNT: 13.9 % (ref 11.5–14.5)
GLUCOSE UR QL STRIP: ABNORMAL
HCT VFR BLD AUTO: 42.7 % (ref 40–54)
HGB BLD-MCNC: 14.7 G/DL (ref 14–18)
HGB UR QL STRIP: NEGATIVE
KETONES UR QL STRIP: NEGATIVE
LEUKOCYTE ESTERASE UR QL STRIP: NEGATIVE
LYMPHOCYTES # BLD AUTO: 2.6 K/UL (ref 1–4.8)
LYMPHOCYTES NFR BLD: 27.5 % (ref 18–48)
MCH RBC QN AUTO: 26.9 PG (ref 27–31)
MCHC RBC AUTO-ENTMCNC: 34.4 G/DL (ref 32–36)
MCV RBC AUTO: 78 FL (ref 82–98)
MICROALBUMIN UR DL<=1MG/L-MCNC: 22 UG/ML
MICROSCOPIC COMMENT: NORMAL
MONOCYTES # BLD AUTO: 0.8 K/UL (ref 0.3–1)
MONOCYTES NFR BLD: 8.9 % (ref 4–15)
NEUTROPHILS # BLD AUTO: 5.1 K/UL (ref 1.8–7.7)
NEUTROPHILS NFR BLD: 55 % (ref 38–73)
NITRITE UR QL STRIP: NEGATIVE
PH UR STRIP: 5 [PH] (ref 5–8)
PLATELET # BLD AUTO: 245 K/UL (ref 150–350)
PMV BLD AUTO: 11.9 FL (ref 9.2–12.9)
PROT UR QL STRIP: NEGATIVE
RBC # BLD AUTO: 5.47 M/UL (ref 4.6–6.2)
RBC #/AREA URNS AUTO: 0 /HPF (ref 0–4)
SP GR UR STRIP: 1.02 (ref 1–1.03)
TSH SERPL DL<=0.005 MIU/L-ACNC: 1.29 UIU/ML (ref 0.4–4)
URN SPEC COLLECT METH UR: ABNORMAL
WBC # BLD AUTO: 9.35 K/UL (ref 3.9–12.7)
WBC #/AREA URNS AUTO: 0 /HPF (ref 0–5)
YEAST UR QL AUTO: NORMAL

## 2019-09-17 PROCEDURE — 84443 ASSAY THYROID STIM HORMONE: CPT

## 2019-09-17 PROCEDURE — 90686 FLU VACCINE (QUAD) GREATER THAN OR EQUAL TO 3YO PRESERVATIVE FREE IM: ICD-10-PCS | Mod: S$GLB,,, | Performed by: NURSE PRACTITIONER

## 2019-09-17 PROCEDURE — 99999 PR PBB SHADOW E&M-EST. PATIENT-LVL I: ICD-10-PCS | Mod: PBBFAC,,,

## 2019-09-17 PROCEDURE — 3074F SYST BP LT 130 MM HG: CPT | Mod: CPTII,S$GLB,, | Performed by: NURSE PRACTITIONER

## 2019-09-17 PROCEDURE — 90472 FLU VACCINE (QUAD) GREATER THAN OR EQUAL TO 3YO PRESERVATIVE FREE IM: ICD-10-PCS | Mod: S$GLB,,, | Performed by: NURSE PRACTITIONER

## 2019-09-17 PROCEDURE — 99999 PR PBB SHADOW E&M-EST. PATIENT-LVL III: CPT | Mod: PBBFAC,,, | Performed by: NURSE PRACTITIONER

## 2019-09-17 PROCEDURE — 99396 PR PREVENTIVE VISIT,EST,40-64: ICD-10-PCS | Mod: S$GLB,,, | Performed by: NURSE PRACTITIONER

## 2019-09-17 PROCEDURE — 82043 UR ALBUMIN QUANTITATIVE: CPT

## 2019-09-17 PROCEDURE — 36415 COLL VENOUS BLD VENIPUNCTURE: CPT

## 2019-09-17 PROCEDURE — 81001 URINALYSIS AUTO W/SCOPE: CPT

## 2019-09-17 PROCEDURE — 99396 PREV VISIT EST AGE 40-64: CPT | Mod: S$GLB,,, | Performed by: NURSE PRACTITIONER

## 2019-09-17 PROCEDURE — 3045F PR MOST RECENT HEMOGLOBIN A1C LEVEL 7.0-9.0%: ICD-10-PCS | Mod: CPTII,S$GLB,, | Performed by: NURSE PRACTITIONER

## 2019-09-17 PROCEDURE — 92250 FUNDUS PHOTOGRAPHY W/I&R: CPT | Mod: 26,S$GLB,, | Performed by: OPHTHALMOLOGY

## 2019-09-17 PROCEDURE — 3074F PR MOST RECENT SYSTOLIC BLOOD PRESSURE < 130 MM HG: ICD-10-PCS | Mod: CPTII,S$GLB,, | Performed by: NURSE PRACTITIONER

## 2019-09-17 PROCEDURE — 90732 PNEUMOCOCCAL POLYSACCHARIDE VACCINE 23-VALENT =>2YO SQ IM: ICD-10-PCS | Mod: S$GLB,,, | Performed by: NURSE PRACTITIONER

## 2019-09-17 PROCEDURE — 85025 COMPLETE CBC W/AUTO DIFF WBC: CPT

## 2019-09-17 PROCEDURE — 3078F PR MOST RECENT DIASTOLIC BLOOD PRESSURE < 80 MM HG: ICD-10-PCS | Mod: CPTII,S$GLB,, | Performed by: NURSE PRACTITIONER

## 2019-09-17 PROCEDURE — 92250 DIABETIC EYE SCREENING PHOTO: ICD-10-PCS | Mod: 26,S$GLB,, | Performed by: OPHTHALMOLOGY

## 2019-09-17 PROCEDURE — 90686 IIV4 VACC NO PRSV 0.5 ML IM: CPT | Mod: S$GLB,,, | Performed by: NURSE PRACTITIONER

## 2019-09-17 PROCEDURE — 99999 PR PBB SHADOW E&M-EST. PATIENT-LVL I: CPT | Mod: PBBFAC,,,

## 2019-09-17 PROCEDURE — 90472 IMMUNIZATION ADMIN EACH ADD: CPT | Mod: S$GLB,,, | Performed by: NURSE PRACTITIONER

## 2019-09-17 PROCEDURE — 3045F PR MOST RECENT HEMOGLOBIN A1C LEVEL 7.0-9.0%: CPT | Mod: CPTII,S$GLB,, | Performed by: NURSE PRACTITIONER

## 2019-09-17 PROCEDURE — 99999 PR PBB SHADOW E&M-EST. PATIENT-LVL III: ICD-10-PCS | Mod: PBBFAC,,, | Performed by: NURSE PRACTITIONER

## 2019-09-17 PROCEDURE — 92250 FUNDUS PHOTOGRAPHY W/I&R: CPT | Mod: TC,S$GLB,, | Performed by: NURSE PRACTITIONER

## 2019-09-17 PROCEDURE — 3078F DIAST BP <80 MM HG: CPT | Mod: CPTII,S$GLB,, | Performed by: NURSE PRACTITIONER

## 2019-09-17 PROCEDURE — 90715 TDAP VACCINE 7 YRS/> IM: CPT | Mod: S$GLB,,, | Performed by: NURSE PRACTITIONER

## 2019-09-17 PROCEDURE — 84402 ASSAY OF FREE TESTOSTERONE: CPT

## 2019-09-17 PROCEDURE — 90732 PPSV23 VACC 2 YRS+ SUBQ/IM: CPT | Mod: S$GLB,,, | Performed by: NURSE PRACTITIONER

## 2019-09-17 PROCEDURE — 92250 DIABETIC EYE SCREENING PHOTO: ICD-10-PCS | Mod: TC,S$GLB,, | Performed by: NURSE PRACTITIONER

## 2019-09-17 PROCEDURE — 90715 TDAP VACCINE GREATER THAN OR EQUAL TO 7YO IM: ICD-10-PCS | Mod: S$GLB,,, | Performed by: NURSE PRACTITIONER

## 2019-09-17 PROCEDURE — 90471 PNEUMOCOCCAL POLYSACCHARIDE VACCINE 23-VALENT =>2YO SQ IM: ICD-10-PCS | Mod: S$GLB,,, | Performed by: NURSE PRACTITIONER

## 2019-09-17 PROCEDURE — 90471 IMMUNIZATION ADMIN: CPT | Mod: S$GLB,,, | Performed by: NURSE PRACTITIONER

## 2019-09-17 RX ORDER — GLIMEPIRIDE 2 MG/1
2 TABLET ORAL 2 TIMES DAILY WITH MEALS
Qty: 180 TABLET | Refills: 3 | Status: SHIPPED | OUTPATIENT
Start: 2019-09-17 | End: 2020-10-14

## 2019-09-17 RX ORDER — METHOCARBAMOL 500 MG/1
500 TABLET, FILM COATED ORAL 2 TIMES DAILY PRN
Refills: 0 | COMMUNITY
Start: 2019-07-25 | End: 2020-12-29

## 2019-09-17 NOTE — PATIENT INSTRUCTIONS
Stop metformin due to nausea    Glimepiride changed to 2mg twice daily. Check Blood sugars twice a day, in AM fasting and in PM at least 2 hrs after last meal, record readings. Fasting sugar in morning should not be > 120, PM blood sugar should not be > 150, if over 200s need to contact our office. Increase exercise, follow 1800 calorie ADA diet.    Flu, Pneumovax 23, and shingles shot today    Check additional labs today, will message with results    Diabetic eye photo today    3 month f/u Dr. Avery      Diet: Diabetes  Food is an important tool that you can use to control diabetes and stay healthy. Eating well-balanced meals in the correct amounts will help you control your blood glucose levels and prevent low blood sugar reactions. It will also help you reduce the health risks of diabetes. There is no one specific diet that is right for everyone with diabetes. But there are general guidelines to follow. A registered dietitian (RD) will create a tailored diet approach thats just right for you. He or she will also help you plan healthy meals and snacks. If you have any questions, call your dietitian for advice.     Guidelines for success  Talk with your healthcare provider before starting a diabetes diet or weight loss program. If you haven't talked with a dietitian yet, ask your provider for a referral. The following guidelines can help you succeed:  · Select foods from the 6 food groups below. Your dietitian will help you find food choices within each group. He or she will also show you serving sizes and how many servings you can have at each meal.  ¨ Grains, beans, and starchy vegetables  ¨ Vegetables  ¨ Fruit  ¨ Milk or yogurt  ¨ Meat, poultry, fish, or tofu  ¨ Healthy fats  · Check your blood sugar levels as directed by your provider. Take any medicine as prescribed by your provider.  · Learn to read food labels and pick the right portion sizes.  · Eat only the amount of food in your meal plan. Eat about  the same amount of food at regular times each day. Dont skip meals. Eat meals 4 to 5 hours apart, with snacks in between.  · Limit alcohol. It raises blood sugar levels. Drink water or calorie-free diet drinks that use safe sweeteners.  · Eat less fat to help lower your risk of heart disease. Use nonfat or low-fat dairy products and lean meats. Avoid fried foods. Use cooking oils that are unsaturated, such as olive, canola, or peanut oil.  · Talk with your dietitian about safe sugar substitutes.  · Avoid added salt. It can contribute to high blood pressure, which can cause heart disease. People with diabetes already have a risk of high blood pressure and heart disease.  · Stay at a healthy weight. If you need to lose weight, cut down on your portion sizes. But dont skip meals. Exercise is an important part of any weight management program. Talk with your provider about an exercise program thats right for you.  · For more information about the best diet plan for you, talk with a registered dietitian (RD). To find an RD in your area, contact:  ¨ Academy of Nutrition and Dietetics www.eatright.org  ¨ The American Diabetes Association 035-237-4112 www.diabetes.org  Date Last Reviewed: 8/1/2016 © 2000-2017 [employer]. [address] [place] [address], [place] [address]. All rights reserved. This information is not intended as a substitute for professional medical care. Always follow your healthcare professional's instructions.    d  Healthy Meals for Diabetes     A healthcare provider will help you develop a meal plan that fits your needs.   Ask your healthcare team to help you make a meal plan that fits your needs. Your meal plan tells you when to eat your meals and snacks, what kinds of foods to eat, and how much of each food to eat. You dont have to give up all the foods you like. But you do need to follow some guidelines.  Choose healthy carbohydrates  Starches, sugars, and fiber are all types of  carbohydrates. Fiber can help lower your cholesterol and triglycerides. Fiber is also healthy for your heart. You should have 20 to 35 grams of total fiber each day. Fiber-rich foods include:  Whole-grain breads and cereals  Bulgur wheat  Brown rice     Whole-wheat pasta  Fruits and vegetables  Dry beans, and peas   Keep track of the amount of carbohydrates you eat. This can help you keep the right balance of physical activity and medicine. The amount of carbohydrates needed will vary for each person. It depends on many things such as your health, the medicines you take, and how active you are. Your healthcare team will help you figure out the right amount of carbohydrates for you. You may start with around 45 to 60 grams of carbohydrates per meal, depending on your situation.   Here are some examples of foods containing about 15 grams of carbohydrates (1 serving of carbohydrates):  1/2 cup of canned or frozen fruit  A small piece of fresh fruit (4 ounces)  1 slice of bread  1/2 cup of oatmeal  1/3 cup of rice  4 to 6 crackers  1/2 English muffin  1/2 cup of black beans  1/4 of a large baked potato (3 ounces)  2/3 cup of plain fat-free yogurt  1 cup of soup  1/2 cup of casserole  6 chicken nuggets  2-inch-square brownie or cake without frosting  2 small cookies  1/2 cup of ice cream or sherbet  Choose healthy protein foods  Eating protein that is low in fat can help you control your weight. It also helps keep your heart healthy. Low-fat protein foods include:  Fish  Plant proteins, such as dry beans and peas, nuts, and soy products like tofu and soymilk  Lean meat with all visible fat removed  Poultry with the skin removed  Low-fat or nonfat milk, cheese, and yogurt  Limit unhealthy fats and sugar  Saturated and trans fats are unhealthy for your heart. They raise LDL (bad) cholesterol. Fat is also high in calories, so it can make you gain weight. To cut down on unhealthy fats and sugar, limit these foods:  Butter or  margarine  Palm and palm kernel oils and coconut oil  Cream  Cheese  Baldwin  Lunch meats     Ice cream  Sweet bakery goods such as pies, muffins, and donuts  Jams and jellies  Candy bars  Regular sodas   How much to eat  The amount of food you eat affects your blood sugar. It also affects your weight. Your healthcare team will tell you how much of each type of food you should eat.  Use measuring cups and spoons and a food scale to measure serving sizes.  Learn what a correct serving size looks like on your plate. This will help when you are away from home and cant measure your servings.  Eat only the number of servings given on your meal plan for each food. Dont take seconds.  Learn to read food labels. Be sure to look at serving size, total carbohydrates, fiber, calories, sugar, and saturated and trans fats. Look for healthier alternatives to foods that have added sugar.  Plan ahead for parties so you can still have a good time without going overboard with unhealthy food choices. Set a good example yourself by bringing a healthy dish to pot lucks.   Choose healthy snacks  When it comes to snacks, we usually think about foods with added sugar and fats. But there are many other options for healthier snack choices. Here are a few snack ideas to choose from:  Snacks with less than 5 grams of carbohydrates  1 piece of string cheese  3 celery sticks plus 1 tablespoon of peanut butter  5 cherry tomatoes plus 1 tablespoon of ranch dressing  1 hard-boiled egg  1/4 cup of fresh blueberries   5 baby carrots  1 cup of light popcorn  1/2 cup of sugar-free gelatin  15 almonds  Snacks with about 10 to 20 grams of carbohydrates  1/3 cup of hummus plus 1 cup of fresh cut nonstarchy vegetables (carrots, green peppers, broccoli, celery, or a combination)  1/2 cup of fresh or canned fruit plus 1/4 cup of cottage cheese  1/2 cup of tuna salad with 4 crackers  2 rice cakes and a tablespoon of peanut butter  1 small apple or orange  3  cups light popcorn  1/2 of a turkey sandwich (1 slice of whole-wheat bread, 2 ounces of turkey, and mustard)  Portion sizes are important to controlling your blood sugar and staying at a healthy weight. Stock up on healthy snack items so you always have them on hand.  When to eat  Your meal plan will likely include breakfast, lunch, dinner, and some snacks.  Try to eat your meals and snacks at about the same times each day.  Eat all your meals and snacks. Skipping a meal or snack can make your blood sugar drop too low. It can also cause you to eat too much at the next meal or snack. Then your blood sugar could get too high.  Date Last Reviewed: 7/1/2016  © 0380-5590 [employer]. [address] [place] [address], [place] [address]. All rights reserved. This information is not intended as a substitute for professional medical care. Always follow your healthcare professional's instructions.

## 2019-09-17 NOTE — PROGRESS NOTES
Subjective:       Patient ID: Michael Slaughter Jr. is a 59 y.o. male.    Chief Complaint: Annual Exam    Established patient of Dr. Avery in for annual PE. Last seen by PCP 10-17-18 for annual.    Patient taking Metformin once a day instead of twice a day. Reports nausea with Metformin. So has been taking at night. Checks BS weekly. Blood sugars have been in the 180-200s range. Breakfast is usually toast and coffee, he eats lunch and dinner, admits not watching carbs as he lives in [facility]. Reports compliance issues with exercise, has not been exercising as he use to due to heat of summer and he feels this is why his A1C went up from 6.5 to 8.7. He was at one point at an 11 with his A1C and he states the exercise help get him to 6.5. He saw endocrine once last year for the A1C of 11.0.    No complaints today    I have reviewed the ROS info pt entered in portal prior to visit today below         I have reviewed the ROS info pt entered in portal below.    Review of Systems   Constitutional: Negative for activity change, appetite change and unexpected weight change.   HENT: Negative for hearing loss, rhinorrhea, trouble swallowing and voice change.    Eyes: Negative for discharge and visual disturbance.   Respiratory: Negative for apnea, cough, chest tightness, shortness of breath and wheezing.    Cardiovascular: Negative for chest pain, palpitations and leg swelling.   Gastrointestinal: Negative for abdominal distention, abdominal pain, blood in stool, constipation, diarrhea, nausea and vomiting.   Endocrine: Positive for polydipsia and polyuria. Negative for cold intolerance, heat intolerance and polyphagia.   Genitourinary: Positive for urgency. Negative for difficulty urinating, dysuria, hematuria and penile pain.   Musculoskeletal: Negative for arthralgias, joint swelling, myalgias and neck pain.   Skin: Negative for color change, pallor, rash and wound.   Allergic/Immunologic: Negative for environmental allergies,  food allergies and immunocompromised state.   Neurological: Positive for weakness. Negative for dizziness and headaches.        States he is not as active as he use to be   Hematological: Negative for adenopathy. Does not bruise/bleed easily.   Psychiatric/Behavioral: Negative for agitation, behavioral problems, confusion, dysphoric mood, self-injury, sleep disturbance and suicidal ideas.         Review of patient's allergies indicates:   Allergen Reactions    Lisinopril      Other reaction(s): cough     Current Outpatient Medications:     amLODIPine (NORVASC) 10 MG tablet, TAKE 1 TABLET (10 MG TOTAL) BY MOUTH ONCE DAILY., Disp: 90 tablet, Rfl: 3    aspirin 81 MG Chew, Take 1 tablet (81 mg total) by mouth once daily., Disp: , Rfl: 0    atorvastatin (LIPITOR) 10 MG tablet, TAKE 1 TABLET BY MOUTH EVERY DAY, Disp: 30 tablet, Rfl: 10    blood sugar diagnostic Strp, 1 strip by Misc.(Non-Drug; Combo Route) route 5 (five) times daily. Whatever insurance allows, Disp: 400 each, Rfl: 12    blood-glucose meter kit, Use as instructed     whatever insurance allows, Disp: 1 each, Rfl: 0    glimepiride (AMARYL) 4 MG tablet, Take 1 tablet (4 mg total) by mouth before breakfast., Disp: 90 tablet, Rfl: 3    metFORMIN (GLUCOPHAGE) 1000 MG tablet, TAKE ONE TABLET TWICE DAILY WITH MEALS, Disp: 60 tablet, Rfl: 2    methocarbamol (ROBAXIN) 500 MG Tab, Take 500 mg by mouth 2 (two) times daily as needed., Disp: , Rfl: 0    omeprazole (PRILOSEC) 40 MG capsule, Take 1 capsule (40 mg total) by mouth every morning., Disp: 30 capsule, Rfl: 1    ONETOUCH DELICA LANCETS 30 gauge Misc, , Disp: , Rfl:     tadalafil (CIALIS) 20 MG Tab, Take 1 tablet (20 mg total) by mouth every other day., Disp: 20 tablet, Rfl: 12     Patient Active Problem List   Diagnosis    Hypertension    Diabetes mellitus, type 2    GERD (gastroesophageal reflux disease)    Sinusitis    ED (erectile dysfunction)    Colon cancer screening     Past Medical  History:   Diagnosis Date    Diabetes mellitus type II     GERD (gastroesophageal reflux disease)     Hypertension     Sinusitis      Past Surgical History:   Procedure Laterality Date    COLONOSCOPY N/A 7/18/2014    Performed by Jonas Polo MD at [facility] ([address] [place] [address])     Social History     Socioeconomic History    Marital status:      Spouse name: Not on file    Number of children: Not on file    Years of education: Not on file    Highest education level: Not on file   Occupational History     Employer: [employer]   Social Needs    Financial resource strain: Not hard at all    Food insecurity:     Worry: Never true     Inability: Never true    Transportation needs:     Medical: No     Non-medical: No   Tobacco Use    Smoking status: Former Smoker     Types: Cigarettes    Smokeless tobacco: Never Used   Substance and Sexual Activity    Alcohol use: Yes     Frequency: Monthly or less     Drinks per session: 1 or 2     Binge frequency: Never     Comment: daily    Drug use: No    Sexual activity: Not on file   Lifestyle    Physical activity:     Days per week: 4 days     Minutes per session: Not on file    Stress: Only a little   Relationships    Social connections:     Talks on phone: More than three times a week     Gets together: More than three times a week     Attends Hinduism service: Not on file     Active member of club or organization: No     Attends meetings of clubs or organizations: Never     Relationship status:    Other Topics Concern    Not on file   Social History Narrative    Not on file     Family History   Problem Relation Age of Onset    Cancer Mother         breast    Birth defects Maternal Grandmother         brain tumor    Heart disease Neg Hx     Stroke Neg Hx     Amblyopia Neg Hx     Blindness Neg Hx     Cataracts Neg Hx     Glaucoma Neg Hx     Hypertension Neg Hx     Macular degeneration Neg Hx     Retinal detachment Neg Hx      "Strabismus Neg Hx        Objective:   Vitals:    09/17/19 1426   BP: 112/74   Pulse: 85   Weight: 103.9 kg (229 lb)   Height: 5' 11" (1.803 m)   PainSc: 0-No pain    Body mass index is 31.94 kg/m².      Physical Exam   Constitutional: He is oriented to person, place, and time. He appears well-developed and well-nourished.   obese   HENT:   Head: Normocephalic.   Right Ear: External ear normal.   Left Ear: External ear normal.   Bilateral wax buildup in both ears   Eyes: Pupils are equal, round, and reactive to light. Conjunctivae, EOM and lids are normal. Lids are everted and swept, no foreign bodies found.   Neck: Trachea normal, normal range of motion and full passive range of motion without pain. Neck supple. No JVD present. Carotid bruit is not present.   Cardiovascular: Normal rate, regular rhythm, normal heart sounds, intact distal pulses and normal pulses.   Pulmonary/Chest: Effort normal and breath sounds normal.   Abdominal: Soft. Normal appearance and bowel sounds are normal. There is no hepatosplenomegaly. There is no CVA tenderness.   Musculoskeletal: Normal range of motion.        Right foot: There is normal range of motion and no deformity.        Left foot: There is normal range of motion and no deformity.   Feet:   Right Foot:   Protective Sensation: 4 sites tested. 4 sites sensed.   Skin Integrity: Negative for ulcer, blister, skin breakdown, erythema, warmth, callus or dry skin.   Left Foot:   Protective Sensation: 4 sites tested. 4 sites sensed.   Skin Integrity: Negative for ulcer, blister, skin breakdown, erythema, warmth, callus or dry skin.   Neurological: He is alert and oriented to person, place, and time.   Skin: Skin is warm, dry and intact. Capillary refill takes less than 2 seconds.   Psychiatric: He has a normal mood and affect. His speech is normal and behavior is normal. Judgment and thought content normal. Cognition and memory are normal.   Nursing note and vitals reviewed.      I " have reviewed recent labs below done prior to visit:    Contains abnormal data Hemoglobin A1c   Order: [id number]   Status:  Final result   Visible to patient:  Yes (Patient Portal) Next appt:  Today at 02:30 PM in Internal Medicine (Kacie De Guzman DNP) Dx:  Type 2 diabetes mellitus without comp...    Ref Range & Units 4d ago 11mo ago 1yr ago   Hemoglobin A1C 4.0 - 5.6 % 8.7High   6.5High  CM 11.4High  CM   Comment: ADA Screening Guidelines:   5.7-6.4%  Consistent with prediabetes   >or=6.5%  Consistent with diabetes   High levels of fetal hemoglobin interfere with the HbA1C   assay. Heterozygous hemoglobin variants (HbS, HgC, etc)do   not significantly interfere with this assay.   However, presence of multiple variants may affect accuracy.    Estimated Avg Glucose 68 - 131 mg/dL 203High   140High   280High     Resulting Agency  [facility]         Specimen Collected: 09/13/19 08:00 Last Resulted: 09/13/19 09:13 Lab Flowsheet Order Details View Encounter Lab and Collection Details Routing Result History      CM=Additional comments             Lipid panel   Order: [id number]   Status:  Final result   Visible to patient:  Yes (Patient Portal) Next appt:  Today at 02:30 PM in Internal Medicine (Kacie De Guzman DNP) Dx:  Type 2 diabetes mellitus without comp...    Ref Range & Units 4d ago 11mo ago 1yr ago   Cholesterol 120 - 199 mg/dL 137  133  CM   Comment: The National Cholesterol Education Program (NCEP) has set the   following guidelines (reference ranges) for Cholesterol:   Optimal.....................<200 mg/dL   Borderline High.............200-239 mg/dL   High........................> or = 240 mg/dL    Triglycerides 30 - 150 mg/dL 112  104 CM 164High  CM   Comment: The National Cholesterol Education Program (NCEP) has set the   following guidelines (reference values) for triglycerides:   Normal......................<150 mg/dL   Borderline High.............150-199 mg/dL    High........................200-499 mg/dL    HDL 40 - 75 mg/dL 40  45 CM 43 CM   Comment: The National Cholesterol Education Program (NCEP) has set the   following guidelines (reference values) for HDL Cholesterol:   Low...............<40 mg/dL   Optimal...........>60 mg/dL    LDL Cholesterol 63.0 - 159.0 mg/dL 74.6  67.2 CM 72.2 CM   Comment: The National Cholesterol Education Program (NCEP) has set the   following guidelines (reference values) for LDL Cholesterol:   Optimal.......................<130 mg/dL   Borderline High...............130-159 mg/dL   High..........................160-189 mg/dL   Very High.....................>190 mg/dL    Hdl/Cholesterol Ratio 20.0 - 50.0 % 29.2  33.8  29.1    Total Cholesterol/HDL Ratio 2.0 - 5.0 3.4  3.0  3.4    Non-HDL Cholesterol mg/dL 97  88  CM   Comment: Risk category and Non-HDL cholesterol goals:   Coronary heart disease (CHD)or equivalent (10-year risk of CHD >20%):   Non-HDL cholesterol goal     <130 mg/dL   Two or more CHD risk factors and 10-year risk of CHD <= 20%:   Non-HDL cholesterol goal     <160 mg/dL   0 to 1 CHD risk factor:   Non-HDL cholesterol goal     <190 mg/dL    Resulting Agency  [facility]         Specimen Collected: 09/13/19 08:00 Last Resulted: 09/13/19 13:29 Lab Flowsheet Order Details View Encounter Lab and Collection Details Routing Result History           Contains abnormal data Comprehensive metabolic panel   Order: [id number]   Status:  Final result   Visible to patient:  Yes (Patient Portal) Next appt:  Today at 02:30 PM in Internal Medicine (Kacie De Guzman DNP) Dx:  Type 2 diabetes mellitus without comp...    Ref Range & Units 4d ago  (9/13/19) 4d ago  (9/13/19) 11mo ago  (10/15/18)   Sodium 136 - 145 mmol/L 138  138  140    Potassium 3.5 - 5.1 mmol/L 4.4  4.4  4.1    Chloride 95 - 110 mmol/L 101  101  102    CO2 23 - 29 mmol/L 25  25  27    Glucose 70 - 110 mg/dL 206High   206High   94    BUN, Bld 8 - 23 mg/dL 14  14  15 R    Creatinine 0.5 - 1.4 mg/dL 1.1  1.1  1.1    Calcium 8.7 - 10.5 mg/dL 9.5  9.5  9.6    Total Protein 6.0 - 8.4 g/dL 7.1  7.1  7.5    Albumin 3.5 - 5.2 g/dL 4.1  4.1  4.3    Total Bilirubin 0.1 - 1.0 mg/dL 0.8  0.8 CM 0.8 CM   Comment: For infants and newborns, interpretation of results should be based   on gestational age, weight and in agreement with clinical   observations.   Premature Infant recommended reference ranges:   Up to 24 hours.............<8.0 mg/dL   Up to 48 hours............<12.0 mg/dL   3-5 days..................<15.0 mg/dL   6-29 days.................<15.0 mg/dL    Alkaline Phosphatase 55 - 135 U/L 56  56  51Low     AST 10 - 40 U/L 12  12  13    ALT 10 - 44 U/L 24  24  19    Anion Gap 8 - 16 mmol/L 12  12  11    eGFR if African American >60 mL/min/1.73 m^2 >60  >60  >60    eGFR if non African American >60 mL/min/1.73 m^2 >60  >60 CM >60 CM   Comment: Calculation used to obtain the estimated glomerular filtration   rate (eGFR) is the CKD-EPI equation.    Resulting Agency  [facility]         Specimen Collected: 09/13/19 08:00 Last Resulted: 09/13/19 08:56 Lab Flowsheet Order Details View Encounter Lab and Collection Details Routing Result History      CM=Additional comments  R=Reference range differs from displayed range                 Assessment:        1. Encounter for health maintenance examination in adult    2. Routine lab draw    3. Need for influenza vaccination    4. BMI 31.0-31.9,adult    5. Essential hypertension    6. Diabetes mellitus without complication    7. Need for 23-polyvalent pneumococcal polysaccharide vaccine    8. Need for shingles vaccine    9. Need for Tdap vaccination    10. Malaise and fatigue    11. Class 2 severe obesity with serious comorbidity in adult, unspecified BMI, unspecified obesity type    12. Uncontrolled type 2 diabetes mellitus with complication, without long-term current use of insulin                  Plan: Michael was seen today for annual  exam.    Diagnoses and all orders for this visit:    Encounter for health maintenance examination in adult  Annual wellness exam completed.    All medications, histories, and concerns reviewed, reconciled, and addressed.    Appropriate Screenings per pt's sex and age have been reviewed and discussed with pt.    BMI reviewed.    Routine lab draw  -     CBC auto differential; Future  -     TSH; Future  -     URINALYSIS  -     MICROALBUMIN / CREATININE RATIO URINE    Need for influenza vaccination  Given today    BMI 31.0-31.9,adult  BMI reviewed    Essential hypertension  -     CBC auto differential; Future  -     TSH; Future  -     URINALYSIS    Stable, followed by PCP    Take medications as prescribed.    Monitor BP at home, goal BP < or = 140/80, call office if consistently above this range.    Follow low salt DASH diet and exercise.    BMI reviewed.    Go to ED if Headaches, blurred vision, chest pain, or SOB occurs along with elevated readings > or = 160/90.    Diabetes mellitus without complication  -     TSH; Future  -     MICROALBUMIN / CREATININE RATIO URINE  -     Diabetic Eye Screening Photo; Future    Need for 23-polyvalent pneumococcal polysaccharide vaccine  -     (In Office Administered) Pneumococcal Polysaccharide Vaccine (23 Valent) (SQ/IM)    Need for shingles vaccine  -     (In Office Administered) Zoster Recombinant Vaccine    Need for Tdap vaccination  Had in 2010 per pt    Malaise and fatigue  -     CBC auto differential; Future  -     Testosterone, free; Future    Class 2 severe obesity with serious comorbidity in adult, unspecified BMI, unspecified obesity type  BMI reivewed    Uncontrolled type 2 diabetes mellitus with complication, without long-term current use of insulin  -     glimepiride (AMARYL) 2 MG tablet; Take 1 tablet (2 mg total) by mouth 2 (two) times daily with meals.    Stop metformin due to nausea    Glimepiride changed to 2mg twice daily. Check Blood sugars twice a day, in AM  fasting and in PM at least 2 hrs after last meal, record readings. Fasting sugar in morning should not be > 120, PM blood sugar should not be > 150, if over 200s need to contact our office. Increase exercise, follow 1800 calorie ADA diet.    Flu, Pneumovax 23, and shingles shot today    Check additional labs today, will message with results    Diabetic eye photo today    3 month f/u Dr. Avery    Self care instructions provided in AVS

## 2019-09-20 LAB — TESTOST FREE SERPL-MCNC: 8 PG/ML

## 2019-10-28 DIAGNOSIS — E11.9 DIABETES MELLITUS WITHOUT COMPLICATION: ICD-10-CM

## 2019-10-28 RX ORDER — METFORMIN HYDROCHLORIDE 1000 MG/1
TABLET ORAL
Qty: 60 TABLET | Refills: 2 | OUTPATIENT
Start: 2019-10-28

## 2019-12-16 ENCOUNTER — TELEPHONE (OUTPATIENT)
Dept: INTERNAL MEDICINE | Facility: CLINIC | Age: 59
End: 2019-12-16

## 2019-12-16 DIAGNOSIS — L98.9 SKIN ABNORMALITY: Primary | ICD-10-CM

## 2019-12-16 NOTE — TELEPHONE ENCOUNTER
----- Message from Arturo Tan sent at 12/16/2019 11:23 AM CST -----  Contact: Patient [phone]  Type: Referral to a Specialist    Where would you like a referral to? Dermatologist     Have you previously requested? No     Is the physician within the [facility]? Yes     Name and phone number of specialist:    Reason for appointment: skin irritation throughout body, white spots    Is an appointment scheduled with specialist? When?    Comments: call to inform has been granted or not. Patient [phone]    Please call an advise  Thank you

## 2019-12-16 NOTE — TELEPHONE ENCOUNTER
12/18developed sore throat at night.  Tuesday 12/19 awoke with chills and sinus pressure with pain She does not have fever or chills at this point but the sinus pressure and pain continue especially when she is lying down. Nasal drainage is green. Please advise.   Message left at work could not leave a message on cell mail box net set up.

## 2019-12-17 ENCOUNTER — OFFICE VISIT (OUTPATIENT)
Dept: DERMATOLOGY | Facility: CLINIC | Age: 59
End: 2019-12-17
Payer: COMMERCIAL

## 2019-12-17 VITALS — BODY MASS INDEX: 32.06 KG/M2 | WEIGHT: 229 LBS | HEIGHT: 71 IN

## 2019-12-17 DIAGNOSIS — R61 NIGHT SWEATS: ICD-10-CM

## 2019-12-17 DIAGNOSIS — L82.1 SK (SEBORRHEIC KERATOSIS): Primary | ICD-10-CM

## 2019-12-17 DIAGNOSIS — E11.9 TYPE 2 DIABETES MELLITUS WITHOUT COMPLICATION, WITHOUT LONG-TERM CURRENT USE OF INSULIN: ICD-10-CM

## 2019-12-17 PROCEDURE — 3008F BODY MASS INDEX DOCD: CPT | Mod: CPTII,S$GLB,, | Performed by: DERMATOLOGY

## 2019-12-17 PROCEDURE — 2024F 7 FLD RTA PHOTO EVC RTNOPTHY: CPT | Mod: S$GLB,,, | Performed by: DERMATOLOGY

## 2019-12-17 PROCEDURE — 2024F PR 7 FIELD PHOTOS WITH INTERP/ REVIEW: ICD-10-PCS | Mod: S$GLB,,, | Performed by: DERMATOLOGY

## 2019-12-17 PROCEDURE — 3008F PR BODY MASS INDEX (BMI) DOCUMENTED: ICD-10-PCS | Mod: CPTII,S$GLB,, | Performed by: DERMATOLOGY

## 2019-12-17 PROCEDURE — 99203 PR OFFICE/OUTPT VISIT, NEW, LEVL III, 30-44 MIN: ICD-10-PCS | Mod: S$GLB,,, | Performed by: DERMATOLOGY

## 2019-12-17 PROCEDURE — 99999 PR PBB SHADOW E&M-EST. PATIENT-LVL III: ICD-10-PCS | Mod: PBBFAC,,, | Performed by: DERMATOLOGY

## 2019-12-17 PROCEDURE — 99203 OFFICE O/P NEW LOW 30 MIN: CPT | Mod: S$GLB,,, | Performed by: DERMATOLOGY

## 2019-12-17 PROCEDURE — 99999 PR PBB SHADOW E&M-EST. PATIENT-LVL III: CPT | Mod: PBBFAC,,, | Performed by: DERMATOLOGY

## 2019-12-17 NOTE — PROGRESS NOTES
New patient here today for white spots to both arms right side scattered for last few weeks.  Denies itch. Reports sweats a lot during night but not during day.  Tried head and shoulders to entire body for few minutes during shower.  Also working on getting dm under control.    Subjective:       Patient ID:  Michael Slaughter Jr. is a 59 y.o. male who presents for   Chief Complaint   Patient presents with    Spot     HPI    Review of Systems   Constitutional: Positive for night sweats. Negative for fever and chills.   HENT: Negative for sore throat and mouth sores.    Eyes: Negative for itching and eye watering.   Respiratory: Negative for cough and shortness of breath.    Gastrointestinal: Negative for abdominal pain and diarrhea.   Musculoskeletal: Negative for joint swelling and arthralgias.   Skin: Positive for rash. Negative for itching, dry skin, daily sunscreen use, activity-related sunscreen use and wears hat.   Neurological: Negative for numbness.   Hematologic/Lymphatic: Does not bruise/bleed easily.        Objective:    Physical Exam   Constitutional: He appears well-developed and well-nourished.   Eyes: No conjunctival no injection.   Neurological: He is alert and oriented to person, place, and time.   Psychiatric: He has a normal mood and affect.   Skin:   Areas Examined (abnormalities noted in diagram):   Scalp / Hair Palpated and Inspected  Head / Face Inspection Performed  Neck Inspection Performed  Chest / Axilla Inspection Performed  Abdomen Inspection Performed  Back Inspection Performed  RUE Inspected  LUE Inspection Performed              Diagram Legend     Erythematous scaling macule/papule c/w actinic keratosis       Vascular papule c/w angioma      Pigmented verrucoid papule/plaque c/w seborrheic keratosis      Yellow umbilicated papule c/w sebaceous hyperplasia      Irregularly shaped tan macule c/w lentigo     1-2 mm smooth white papules consistent with Milia      Movable subcutaneous cyst  with punctum c/w epidermal inclusion cyst      Subcutaneous movable cyst c/w pilar cyst      Firm pink to brown papule c/w dermatofibroma      Pedunculated fleshy papule(s) c/w skin tag(s)      Evenly pigmented macule c/w junctional nevus     Mildly variegated pigmented, slightly irregular-bordered macule c/w mildly atypical nevus      Flesh colored to evenly pigmented papule c/w intradermal nevus       Pink pearly papule/plaque c/w basal cell carcinoma      Erythematous hyperkeratotic cursted plaque c/w SCC      Surgical scar with no sign of skin cancer recurrence      Open and closed comedones      Inflammatory papules and pustules      Verrucoid papule consistent consistent with wart     Erythematous eczematous patches and plaques     Dystrophic onycholytic nail with subungual debris c/w onychomycosis     Umbilicated papule    Erythematous-base heme-crusted tan verrucoid plaque consistent with inflamed seborrheic keratosis     Erythematous Silvery Scaling Plaque c/w Psoriasis     See annotation      Assessment / Plan:        SK (seborrheic keratosis)  Eruptive.  Discussed with patient the etiology and pathogenesis of the disease or skin lesion(s) and possible treatments and aggravators.    Discussed with patient the benign nature of these lesions and that no treatment is indicated.    Brochure given for patient education.  Patient can monitor these lesions or lesion with their primary care provider during routine visits and notify us if there are any changes for reevaluation.  At a minimum they should keep their annual visits with their primary for check ups.  Warned of poss assoc with internal disease process.    Night sweats  Patient can monitor these lesions or lesion with their primary care provider during routine visits and notify us if there are any changes for reevaluation.  At a minimum they should keep their annual visits with their primary for check ups.  Pt to fu with primary about sweating.    Type 2  diabetes mellitus without complication, without long-term current use of insulin  Pt to fu with primary for good control.           Follow up if symptoms worsen or fail to improve.

## 2020-01-27 DIAGNOSIS — I10 ESSENTIAL HYPERTENSION, BENIGN: ICD-10-CM

## 2020-01-27 RX ORDER — AMLODIPINE BESYLATE 10 MG/1
10 TABLET ORAL DAILY
Qty: 90 TABLET | Refills: 0 | Status: SHIPPED | OUTPATIENT
Start: 2020-01-27 | End: 2020-04-02 | Stop reason: SDUPTHER

## 2020-01-27 NOTE — TELEPHONE ENCOUNTER
Refill Authorization Note     is requesting a refill authorization.    Brief assessment and rationale for refill: APPROVE: needs appt     Medication-related problems identified: Requires appointment    Medication Therapy Plan: needs appt(ANNUAL)                              Comments:   Requested Prescriptions   Signed Prescriptions Disp Refills    amLODIPine (NORVASC) 10 MG tablet 90 tablet 0     Sig: Take 1 tablet (10 mg total) by mouth once daily.       Cardiovascular:  Calcium Channel Blockers Passed - 1/27/2020  7:39 AM        Passed - Patient is at least 18 years old        Passed - Last BP in normal range within 360 days     BP Readings from Last 3 Encounters:   09/17/19 112/74   10/17/18 134/66   07/30/18 120/82              Passed - Office visit in past 12 months or future 90 days     Recent Outpatient Visits            1 month ago SK (seborrheic keratosis)    [facility] - Dermatology [place] MD    4 months ago Encounter for health maintenance examination in adult    [facility] - Internal Medicine Kacie De Guzman DNP    1 year ago Type 2 diabetes mellitus without complication, without long-term current use of insulin    [facility] - Internal Medicine Balbir Avery MD    1 year ago Type 2 diabetes mellitus without complication, without long-term current use of insulin    [facility] Optometry Batool Rosa, OD    1 year ago Type 2 diabetes mellitus without complication, without long-term current use of insulin    [facility] - Endocrinology [address] [place] Marbella Walsh NP                     Appointments  past 12m or future 3m with PCP    Date Provider   Last Visit   10/17/2018 Balbir Avery MD   Next Visit   Visit date not found Balbir Avery MD   .  ED visits in past 90 days: [unfilled]

## 2020-02-10 ENCOUNTER — PATIENT OUTREACH (OUTPATIENT)
Dept: ADMINISTRATIVE | Facility: HOSPITAL | Age: 60
End: 2020-02-10

## 2020-03-04 ENCOUNTER — TELEPHONE (OUTPATIENT)
Dept: INTERNAL MEDICINE | Facility: CLINIC | Age: 60
End: 2020-03-04

## 2020-03-04 DIAGNOSIS — Z76.89 ENCOUNTER TO ESTABLISH CARE WITH NEW DOCTOR: ICD-10-CM

## 2020-03-04 DIAGNOSIS — E11.9 TYPE 2 DIABETES MELLITUS WITHOUT COMPLICATION, WITHOUT LONG-TERM CURRENT USE OF INSULIN: ICD-10-CM

## 2020-03-04 DIAGNOSIS — Z12.5 PROSTATE CANCER SCREENING: ICD-10-CM

## 2020-03-04 DIAGNOSIS — I10 ESSENTIAL HYPERTENSION: Primary | ICD-10-CM

## 2020-03-04 NOTE — TELEPHONE ENCOUNTER
----- Message from Zara Sotomayor sent at 3/4/2020  9:16 AM CST -----  Contact: patient  Patient would like labs ordered prior to next appointment.     He can be reached at [phone]    Thanks  KB

## 2020-03-22 ENCOUNTER — PATIENT OUTREACH (OUTPATIENT)
Dept: ADMINISTRATIVE | Facility: HOSPITAL | Age: 60
End: 2020-03-22

## 2020-03-23 NOTE — PROGRESS NOTES
Immunizations reviewed. [facility] reviewed. Care Everywhere reviewed. [facility] reviewed w/ no results yielded. Portal message sent to patient. Chart review completed.

## 2020-04-02 DIAGNOSIS — I10 ESSENTIAL HYPERTENSION, BENIGN: ICD-10-CM

## 2020-04-03 RX ORDER — AMLODIPINE BESYLATE 10 MG/1
10 TABLET ORAL DAILY
Qty: 90 TABLET | Refills: 3 | Status: SHIPPED | OUTPATIENT
Start: 2020-04-03 | End: 2020-04-06 | Stop reason: SDUPTHER

## 2020-04-06 DIAGNOSIS — I10 ESSENTIAL HYPERTENSION, BENIGN: ICD-10-CM

## 2020-04-06 RX ORDER — AMLODIPINE BESYLATE 10 MG/1
10 TABLET ORAL DAILY
Qty: 90 TABLET | Refills: 0 | Status: SHIPPED | OUTPATIENT
Start: 2020-04-06 | End: 2020-05-18 | Stop reason: SDUPTHER

## 2020-04-20 ENCOUNTER — TELEPHONE (OUTPATIENT)
Dept: INTERNAL MEDICINE | Facility: CLINIC | Age: 60
End: 2020-04-20

## 2020-04-20 NOTE — TELEPHONE ENCOUNTER
----- Message from Amrita Dixon sent at 4/20/2020  3:05 PM CDT -----  Contact: Patient [phone]  Patient is returning a phone call.  Who left a message for the patient: Not sure  Does patient know what this is regarding:  Not sure  Comments:

## 2020-05-13 ENCOUNTER — LAB VISIT (OUTPATIENT)
Dept: LAB | Facility: HOSPITAL | Age: 60
End: 2020-05-13
Attending: FAMILY MEDICINE
Payer: COMMERCIAL

## 2020-05-13 DIAGNOSIS — E11.9 TYPE 2 DIABETES MELLITUS WITHOUT COMPLICATION, WITHOUT LONG-TERM CURRENT USE OF INSULIN: ICD-10-CM

## 2020-05-13 DIAGNOSIS — Z76.89 ENCOUNTER TO ESTABLISH CARE WITH NEW DOCTOR: ICD-10-CM

## 2020-05-13 DIAGNOSIS — Z12.5 PROSTATE CANCER SCREENING: ICD-10-CM

## 2020-05-13 DIAGNOSIS — I10 ESSENTIAL HYPERTENSION: ICD-10-CM

## 2020-05-13 LAB
ALBUMIN SERPL BCP-MCNC: 4.2 G/DL (ref 3.5–5.2)
ALP SERPL-CCNC: 58 U/L (ref 55–135)
ALT SERPL W/O P-5'-P-CCNC: 36 U/L (ref 10–44)
ANION GAP SERPL CALC-SCNC: 8 MMOL/L (ref 8–16)
AST SERPL-CCNC: 20 U/L (ref 10–40)
BASOPHILS # BLD AUTO: 0.07 K/UL (ref 0–0.2)
BASOPHILS NFR BLD: 0.8 % (ref 0–1.9)
BILIRUB SERPL-MCNC: 0.9 MG/DL (ref 0.1–1)
BUN SERPL-MCNC: 13 MG/DL (ref 8–23)
CALCIUM SERPL-MCNC: 9.9 MG/DL (ref 8.7–10.5)
CHLORIDE SERPL-SCNC: 102 MMOL/L (ref 95–110)
CHOLEST SERPL-MCNC: 128 MG/DL (ref 120–199)
CHOLEST/HDLC SERPL: 2.9 {RATIO} (ref 2–5)
CO2 SERPL-SCNC: 29 MMOL/L (ref 23–29)
COMPLEXED PSA SERPL-MCNC: 0.52 NG/ML (ref 0–4)
CREAT SERPL-MCNC: 1.2 MG/DL (ref 0.5–1.4)
DIFFERENTIAL METHOD: ABNORMAL
EOSINOPHIL # BLD AUTO: 0.2 K/UL (ref 0–0.5)
EOSINOPHIL NFR BLD: 2.6 % (ref 0–8)
ERYTHROCYTE [DISTWIDTH] IN BLOOD BY AUTOMATED COUNT: 13.4 % (ref 11.5–14.5)
EST. GFR  (AFRICAN AMERICAN): >60 ML/MIN/1.73 M^2
EST. GFR  (NON AFRICAN AMERICAN): >60 ML/MIN/1.73 M^2
ESTIMATED AVG GLUCOSE: 214 MG/DL (ref 68–131)
GLUCOSE SERPL-MCNC: 179 MG/DL (ref 70–110)
HBA1C MFR BLD HPLC: 9.1 % (ref 4–5.6)
HCT VFR BLD AUTO: 47.4 % (ref 40–54)
HDLC SERPL-MCNC: 44 MG/DL (ref 40–75)
HDLC SERPL: 34.4 % (ref 20–50)
HGB BLD-MCNC: 15.3 G/DL (ref 14–18)
IMM GRANULOCYTES # BLD AUTO: 0.02 K/UL (ref 0–0.04)
IMM GRANULOCYTES NFR BLD AUTO: 0.2 % (ref 0–0.5)
LDLC SERPL CALC-MCNC: 59.8 MG/DL (ref 63–159)
LYMPHOCYTES # BLD AUTO: 2.4 K/UL (ref 1–4.8)
LYMPHOCYTES NFR BLD: 27.9 % (ref 18–48)
MCH RBC QN AUTO: 26.5 PG (ref 27–31)
MCHC RBC AUTO-ENTMCNC: 32.3 G/DL (ref 32–36)
MCV RBC AUTO: 82 FL (ref 82–98)
MONOCYTES # BLD AUTO: 0.8 K/UL (ref 0.3–1)
MONOCYTES NFR BLD: 9.7 % (ref 4–15)
NEUTROPHILS # BLD AUTO: 5 K/UL (ref 1.8–7.7)
NEUTROPHILS NFR BLD: 58.8 % (ref 38–73)
NONHDLC SERPL-MCNC: 84 MG/DL
NRBC BLD-RTO: 0 /100 WBC
PLATELET # BLD AUTO: 264 K/UL (ref 150–350)
PMV BLD AUTO: 12.6 FL (ref 9.2–12.9)
POTASSIUM SERPL-SCNC: 4 MMOL/L (ref 3.5–5.1)
PROT SERPL-MCNC: 7.3 G/DL (ref 6–8.4)
RBC # BLD AUTO: 5.77 M/UL (ref 4.6–6.2)
SODIUM SERPL-SCNC: 139 MMOL/L (ref 136–145)
TRIGL SERPL-MCNC: 121 MG/DL (ref 30–150)
TSH SERPL DL<=0.005 MIU/L-ACNC: 2.44 UIU/ML (ref 0.4–4)
WBC # BLD AUTO: 8.56 K/UL (ref 3.9–12.7)

## 2020-05-13 PROCEDURE — 84443 ASSAY THYROID STIM HORMONE: CPT

## 2020-05-13 PROCEDURE — 80061 LIPID PANEL: CPT

## 2020-05-13 PROCEDURE — 36415 COLL VENOUS BLD VENIPUNCTURE: CPT

## 2020-05-13 PROCEDURE — 80053 COMPREHEN METABOLIC PANEL: CPT

## 2020-05-13 PROCEDURE — 83036 HEMOGLOBIN GLYCOSYLATED A1C: CPT

## 2020-05-13 PROCEDURE — 84153 ASSAY OF PSA TOTAL: CPT

## 2020-05-13 PROCEDURE — 85025 COMPLETE CBC W/AUTO DIFF WBC: CPT

## 2020-05-18 ENCOUNTER — OFFICE VISIT (OUTPATIENT)
Dept: INTERNAL MEDICINE | Facility: CLINIC | Age: 60
End: 2020-05-18
Payer: COMMERCIAL

## 2020-05-18 DIAGNOSIS — L29.9 EAR ITCHING: ICD-10-CM

## 2020-05-18 DIAGNOSIS — I10 ESSENTIAL HYPERTENSION, BENIGN: ICD-10-CM

## 2020-05-18 DIAGNOSIS — E66.01 CLASS 2 SEVERE OBESITY WITH SERIOUS COMORBIDITY IN ADULT, UNSPECIFIED BMI, UNSPECIFIED OBESITY TYPE: ICD-10-CM

## 2020-05-18 DIAGNOSIS — I10 ESSENTIAL HYPERTENSION: Primary | ICD-10-CM

## 2020-05-18 PROBLEM — E66.812 CLASS 2 SEVERE OBESITY WITH SERIOUS COMORBIDITY IN ADULT: Status: ACTIVE | Noted: 2020-05-18

## 2020-05-18 PROCEDURE — 2024F 7 FLD RTA PHOTO EVC RTNOPTHY: CPT | Mod: ,,, | Performed by: FAMILY MEDICINE

## 2020-05-18 PROCEDURE — 3046F HEMOGLOBIN A1C LEVEL >9.0%: CPT | Mod: CPTII,,, | Performed by: FAMILY MEDICINE

## 2020-05-18 PROCEDURE — 99214 PR OFFICE/OUTPT VISIT, EST, LEVL IV, 30-39 MIN: ICD-10-PCS | Mod: 95,,, | Performed by: FAMILY MEDICINE

## 2020-05-18 PROCEDURE — 2024F PR 7 FIELD PHOTOS WITH INTERP/ REVIEW: ICD-10-PCS | Mod: ,,, | Performed by: FAMILY MEDICINE

## 2020-05-18 PROCEDURE — 99214 OFFICE O/P EST MOD 30 MIN: CPT | Mod: 95,,, | Performed by: FAMILY MEDICINE

## 2020-05-18 PROCEDURE — 3046F PR MOST RECENT HEMOGLOBIN A1C LEVEL > 9.0%: ICD-10-PCS | Mod: CPTII,,, | Performed by: FAMILY MEDICINE

## 2020-05-18 RX ORDER — AMLODIPINE BESYLATE 10 MG/1
10 TABLET ORAL DAILY
Qty: 90 TABLET | Refills: 3 | Status: SHIPPED | OUTPATIENT
Start: 2020-05-18 | End: 2020-12-23

## 2020-05-18 RX ORDER — METFORMIN HYDROCHLORIDE 1000 MG/1
1000 TABLET ORAL 2 TIMES DAILY WITH MEALS
Qty: 180 TABLET | Refills: 3 | Status: SHIPPED | OUTPATIENT
Start: 2020-05-18 | End: 2021-03-05

## 2020-05-18 RX ORDER — ATORVASTATIN CALCIUM 10 MG/1
10 TABLET, FILM COATED ORAL DAILY
Qty: 90 TABLET | Refills: 3 | Status: SHIPPED | OUTPATIENT
Start: 2020-05-18 | End: 2021-03-05

## 2020-05-18 NOTE — PATIENT INSTRUCTIONS
We discussed whole food plant based diets and the good outcomes from recent clinical trails. Recommended the books:   1. Dr. Des Marion's Program for Reversing Diabetes: The Scientifically Proven System for Reversing Diabetes Without Drugs   2. How Not to Die  by Diego Valdes M.D

## 2020-05-18 NOTE — PROGRESS NOTES
The patient location is: [place]  The chief complaint leading to consultation is: establish care, med RFs    Visit type: audiovisual    Face to Face time with patient: 15  25 minutes of total time spent on the encounter, which includes face to face time and non-face to face time preparing to see the patient (eg, review of tests), Obtaining and/or reviewing separately obtained history, Documenting clinical information in the electronic or other health record, Independently interpreting results (not separately reported) and communicating results to the patient/family/caregiver, or Care coordination (not separately reported).         Each patient to whom he or she provides medical services by telemedicine is:  (1) informed of the relationship between the physician and patient and the respective role of any other health care provider with respect to management of the patient; and (2) notified that he or she may decline to receive medical services by telemedicine and may withdraw from such care at any time.    Notes:           Subjective:       Patient ID: Michael Slaughter Jr. is a 59 y.o. male.    Chief Complaint: No chief complaint on file.    Patient is here to establish care  Recent blood work reviewed - all good except HgbA1c  On metformin 100 bid but only takes daily due to adding 2nd one causes nausea/diarrhea - we discussed gradually increaing over 4 weeks    Diabetes Management Status    Statin: Taking  ACE/ARB: Not taking    Screening or Prevention Patient's value Goal Complete/Controlled?   HgA1C Testing and Control   Lab Results   Component Value Date    HGBA1C 9.1 (H) 05/13/2020      Annually/Less than 8% No   Lipid profile : 05/13/2020 Annually Yes   LDL control Lab Results   Component Value Date    LDLCALC 59.8 (L) 05/13/2020    Annually/Less than 100 mg/dl  Yes   Nephropathy screening Lab Results   Component Value Date    LABMICR 22.0 09/17/2019     Lab Results   Component Value Date    PROTEINUA Negative  09/17/2019    Annually Yes   Blood pressure BP Readings from Last 1 Encounters:   09/17/19 112/74    Less than 140/90 Yes   Dilated retinal exam : 09/17/2019 Annually Yes   Foot exam   : 09/17/2019 Annually Yes     Patient Active Problem List   Diagnosis    Hypertension    Uncontrolled type 2 diabetes mellitus with complication, without long-term current use of insulin    GERD (gastroesophageal reflux disease)    Sinusitis    ED (erectile dysfunction)    Colon cancer screening    Class 2 severe obesity with serious comorbidity in adult     Current Outpatient Medications on File Prior to Visit   Medication Sig Dispense Refill    amLODIPine (NORVASC) 10 MG tablet Take 1 tablet (10 mg total) by mouth once daily. Needs an appointment 90 tablet 0    aspirin 81 MG Chew Take 1 tablet (81 mg total) by mouth once daily.  0    atorvastatin (LIPITOR) 10 MG tablet TAKE 1 TABLET BY MOUTH EVERY DAY 30 tablet 10    blood sugar diagnostic Strp 1 strip by Misc.(Non-Drug; Combo Route) route 5 (five) times daily. Whatever insurance allows 400 each 12    blood-glucose meter kit Use as instructed     whatever insurance allows 1 each 0    glimepiride (AMARYL) 2 MG tablet Take 1 tablet (2 mg total) by mouth 2 (two) times daily with meals. 180 tablet 3    methocarbamol (ROBAXIN) 500 MG Tab Take 500 mg by mouth 2 (two) times daily as needed.  0    omeprazole (PRILOSEC) 40 MG capsule Take 1 capsule (40 mg total) by mouth every morning. 30 capsule 1    ONETOUCH DELICA LANCETS 30 gauge Misc       tadalafil (CIALIS) 20 MG Tab Take 1 tablet (20 mg total) by mouth every other day. 20 tablet 12     No current facility-administered medications on file prior to visit.          Review of Systems   Constitutional: Negative for activity change and unexpected weight change.   HENT: Negative for hearing loss, rhinorrhea and trouble swallowing.         C/o ear itching   Eyes: Negative for discharge and visual disturbance.   Respiratory:  Negative for chest tightness and wheezing.    Cardiovascular: Negative for chest pain and palpitations.   Gastrointestinal: Negative for blood in stool, constipation, diarrhea and vomiting.   Endocrine: Positive for polydipsia and polyuria.   Genitourinary: Positive for urgency. Negative for difficulty urinating and hematuria.   Musculoskeletal: Negative for arthralgias, joint swelling and neck pain.   Neurological: Negative for weakness and headaches.   Psychiatric/Behavioral: Negative for confusion and dysphoric mood.       Objective:     n/a given virtual visit limitations  Physical Exam    Assessment:       1. Essential hypertension    2. Uncontrolled type 2 diabetes mellitus with complication, without long-term current use of insulin    3. Class 2 severe obesity with serious comorbidity in adult, unspecified BMI, unspecified obesity type    4. Essential hypertension, benign    5. Ear itching        Plan:       Diagnoses and all orders for this visit:    Essential hypertension / Essential hypertension, benign  -     RF amLODIPine (NORVASC) 10 MG tablet; Take 1 tablet (10 mg total) by mouth once daily. Needs an appointment    Uncontrolled type 2 diabetes mellitus with complication, without long-term current use of insulin  -     metFORMIN (GLUCOPHAGE) 1000 MG tablet; Take 1 tablet (1,000 mg total) by mouth 2 (two) times daily with meals.  -     atorvastatin (LIPITOR) 10 MG tablet; Take 1 tablet (10 mg total) by mouth once daily.  -     Hemoglobin A1C; Future - 1 week before visit in 4 mo. If not meeting goal consider trulicity or similar  -work harder on diet and exercise and weight loss  We discussed whole food plant based diets and the good outcomes from recent clinical trails. Recommended the book How Not to Die  by Diego Valdes M.D    Class 2 severe obesity with serious comorbidity in adult, unspecified BMI, unspecified obesity type  We discussed whole food plant based diets and the good outcomes from  recent clinical trails. Recommended the book How Not to Die  by Diego Valdes M.D    Ear itching  Can use 1% HC OTC and apply small amont on Q-tip to area of ears that itch    Follow up in about 4 months (around 9/18/2020). - for in-person visit

## 2020-09-04 ENCOUNTER — PATIENT OUTREACH (OUTPATIENT)
Dept: ADMINISTRATIVE | Facility: HOSPITAL | Age: 60
End: 2020-09-04

## 2020-09-04 NOTE — PROGRESS NOTES
Health Maintenance Due   Topic Date Due    HIV Screening  08/26/1975    Shingles Vaccine (2 of 2) 11/12/2019    Influenza Vaccine (1) 08/01/2020    Hemoglobin A1c  08/13/2020    Foot Exam  09/17/2020    Eye Exam  09/17/2020     Order has been placed for diabetic eye screening photo.  Portal message has been sent to patient regarding overdue diabetic eye exam.  Chart review completed.

## 2020-09-11 ENCOUNTER — LAB VISIT (OUTPATIENT)
Dept: LAB | Facility: HOSPITAL | Age: 60
End: 2020-09-11
Attending: FAMILY MEDICINE
Payer: COMMERCIAL

## 2020-09-11 LAB
ESTIMATED AVG GLUCOSE: 192 MG/DL (ref 68–131)
HBA1C MFR BLD HPLC: 8.3 % (ref 4–5.6)

## 2020-09-11 PROCEDURE — 83036 HEMOGLOBIN GLYCOSYLATED A1C: CPT

## 2020-09-11 PROCEDURE — 36415 COLL VENOUS BLD VENIPUNCTURE: CPT

## 2020-09-18 ENCOUNTER — TELEPHONE (OUTPATIENT)
Dept: INTERNAL MEDICINE | Facility: CLINIC | Age: 60
End: 2020-09-18

## 2020-09-18 NOTE — TELEPHONE ENCOUNTER
----- Message from Marie Warren sent at 9/18/2020  8:55 AM CDT -----  Contact: [phone]  Patient apologize for not attending to his 8:30 jovany. Patient would like to know if he can reschedule for a same day virtual visit. Please call and advise

## 2020-09-18 NOTE — TELEPHONE ENCOUNTER
----- Message from Nani Ornelas sent at 9/18/2020  3:47 PM CDT -----  Contact: patient [phone]  Patient has been trying to contact you all day. His appointment this morning was at 8:30 in clinic and patient thought it was a virtual visit.     Patient has called 3 times today and was told that the nurse was with a patient and would call him back. I tried to schedule a virtual visit but your next available is not until 9/28 and patient refuses to see anyone else.

## 2020-09-18 NOTE — TELEPHONE ENCOUNTER
----- Message from Anupama Lemos sent at 9/18/2020  3:13 PM CDT -----  Regarding: Return call  Contact: Patient @ [phone]  Patient is returning a phone call.  Who left a message for the patient: Errol Dixon MA  Does patient know what this is regarding:    Comments:

## 2020-09-24 ENCOUNTER — PATIENT MESSAGE (OUTPATIENT)
Dept: ADMINISTRATIVE | Facility: HOSPITAL | Age: 60
End: 2020-09-24

## 2020-09-24 ENCOUNTER — PATIENT OUTREACH (OUTPATIENT)
Dept: ADMINISTRATIVE | Facility: HOSPITAL | Age: 60
End: 2020-09-24

## 2020-09-24 NOTE — PROGRESS NOTES
Health Maintenance Due   Topic Date Due    HIV Screening  08/26/1975    Shingles Vaccine (2 of 2) 11/12/2019    Influenza Vaccine (1) 08/01/2020    Foot Exam  09/17/2020    Eye Exam  09/17/2020     Portal message has been sent to patient regarding overdue diabetic eye exam.  Chart review completed.

## 2020-09-28 ENCOUNTER — TELEPHONE (OUTPATIENT)
Dept: INTERNAL MEDICINE | Facility: CLINIC | Age: 60
End: 2020-09-28

## 2020-09-28 ENCOUNTER — OFFICE VISIT (OUTPATIENT)
Dept: INTERNAL MEDICINE | Facility: CLINIC | Age: 60
End: 2020-09-28
Payer: COMMERCIAL

## 2020-09-28 DIAGNOSIS — Z00.00 HEALTHCARE MAINTENANCE: ICD-10-CM

## 2020-09-28 PROCEDURE — 99214 PR OFFICE/OUTPT VISIT, EST, LEVL IV, 30-39 MIN: ICD-10-PCS | Mod: 95,,, | Performed by: FAMILY MEDICINE

## 2020-09-28 PROCEDURE — 99214 OFFICE O/P EST MOD 30 MIN: CPT | Mod: 95,,, | Performed by: FAMILY MEDICINE

## 2020-09-28 PROCEDURE — 3052F HG A1C>EQUAL 8.0%<EQUAL 9.0%: CPT | Mod: CPTII,,, | Performed by: FAMILY MEDICINE

## 2020-09-28 PROCEDURE — 3052F PR MOST RECENT HEMOGLOBIN A1C LEVEL 8.0 - < 9.0%: ICD-10-PCS | Mod: CPTII,,, | Performed by: FAMILY MEDICINE

## 2020-09-28 NOTE — PATIENT INSTRUCTIONS
Wt Readings from Last 9 Encounters:   12/17/19 103.9 kg (229 lb)   09/17/19 103.9 kg (229 lb)   10/17/18 101.7 kg (224 lb 3.3 oz)   07/30/18 98.2 kg (216 lb 7.9 oz)   07/16/18 100.4 kg (221 lb 5.5 oz)   11/21/17 103.2 kg (227 lb 8.2 oz)   08/28/17 102.6 kg (226 lb 3.1 oz)   02/15/17 100.9 kg (222 lb 7.1 oz)   05/23/16 46.4 kg (102 lb 3.2 oz)

## 2020-09-28 NOTE — TELEPHONE ENCOUNTER
Pt is scheduled to do labs on 12/14/2020 and appt to see Dr. Deal on 12/21/2020 @2:30pm, but schedule will show 2:15pm. Will send out reminder letter with correct date and time of appt to pt

## 2020-09-28 NOTE — PROGRESS NOTES
The patient location is: [place]  The chief complaint leading to consultation is: DM f/u    Visit type: audiovisual    Face to Face time with patient: 20  25 minutes of total time spent on the encounter, which includes face to face time and non-face to face time preparing to see the patient (eg, review of tests), Obtaining and/or reviewing separately obtained history, Documenting clinical information in the electronic or other health record, Independently interpreting results (not separately reported) and communicating results to the patient/family/caregiver, or Care coordination (not separately reported).         Each patient to whom he or she provides medical services by telemedicine is:  (1) informed of the relationship between the physician and patient and the respective role of any other health care provider with respect to management of the patient; and (2) notified that he or she may decline to receive medical services by telemedicine and may withdraw from such care at any time.    Notes:           Subjective:       Patient ID: Michael Slaughter Jr. is a 60 y.o. male.    Chief Complaint: No chief complaint on file.    Patient is here for follow-up of their chronic diseases, see last note for details    Social History     Tobacco Use    Smoking status: Former Smoker     Types: Cigarettes    Smokeless tobacco: Never Used   Substance Use Topics    Alcohol use: Yes     Frequency: Monthly or less     Drinks per session: 1 or 2     Binge frequency: Never     Comment: daily    Drug use: No       Family History   Problem Relation Age of Onset    Cancer Mother         breast    Birth defects Maternal Grandmother         brain tumor    Heart disease Neg Hx     Stroke Neg Hx     Amblyopia Neg Hx     Blindness Neg Hx     Cataracts Neg Hx     Glaucoma Neg Hx     Hypertension Neg Hx     Macular degeneration Neg Hx     Retinal detachment Neg Hx     Strabismus Neg Hx        No past surgical history on  file.    Patient Active Problem List   Diagnosis    Hypertension    Uncontrolled type 2 diabetes mellitus with complication, without long-term current use of insulin    GERD (gastroesophageal reflux disease)    Sinusitis    ED (erectile dysfunction)    Colon cancer screening    Class 2 severe obesity with serious comorbidity in adult       Current Outpatient Medications on File Prior to Visit   Medication Sig Dispense Refill    amLODIPine (NORVASC) 10 MG tablet Take 1 tablet (10 mg total) by mouth once daily. Needs an appointment 90 tablet 3    atorvastatin (LIPITOR) 10 MG tablet Take 1 tablet (10 mg total) by mouth once daily. 90 tablet 3    blood sugar diagnostic Strp 1 strip by Misc.(Non-Drug; Combo Route) route 5 (five) times daily. Whatever insurance allows 400 each 12    blood-glucose meter kit Use as instructed     whatever insurance allows 1 each 0    glimepiride (AMARYL) 2 MG tablet Take 1 tablet (2 mg total) by mouth 2 (two) times daily with meals. 180 tablet 3    metFORMIN (GLUCOPHAGE) 1000 MG tablet Take 1 tablet (1,000 mg total) by mouth 2 (two) times daily with meals. 180 tablet 3    methocarbamol (ROBAXIN) 500 MG Tab Take 500 mg by mouth 2 (two) times daily as needed.  0    omeprazole (PRILOSEC) 40 MG capsule Take 1 capsule (40 mg total) by mouth every morning. 30 capsule 1    ONETOUCH DELICA LANCETS 30 gauge Misc       tadalafil (CIALIS) 20 MG Tab Take 1 tablet (20 mg total) by mouth every other day. 20 tablet 12     No current facility-administered medications on file prior to visit.      Diabetes Management Status    Statin: Taking  ACE/ARB: Not taking    Screening or Prevention Patient's value Goal Complete/Controlled?   HgA1C Testing and Control   Lab Results   Component Value Date    HGBA1C 8.3 (H) 09/11/2020      Annually/Less than 8% No   Lipid profile : 05/13/2020 Annually Yes   LDL control Lab Results   Component Value Date    LDLCALC 59.8 (L) 05/13/2020    Annually/Less  than 100 mg/dl  Yes   Nephropathy screening Lab Results   Component Value Date    LABMICR 22.0 09/17/2019     Lab Results   Component Value Date    PROTEINUA Negative 09/17/2019    Annually No   Blood pressure BP Readings from Last 1 Encounters:   09/17/19 112/74    Less than 140/90 Yes   Dilated retinal exam : 09/17/2019 Annually Yes   Foot exam   : 09/17/2019 Annually No           Review of Systems   Constitutional: Positive for fatigue.   Cardiovascular: Negative for chest pain.   Endocrine: Positive for polydipsia and polyuria. Negative for polyphagia.   Skin: Negative for pallor.   Neurological: Positive for weakness and headaches. Negative for dizziness, tremors, seizures and speech difficulty.   Psychiatric/Behavioral: Negative for confusion. The patient is not nervous/anxious.        Objective:      Physical Exam   virtual  Assessment:       1. Uncontrolled type 2 diabetes mellitus with complication, without long-term current use of insulin        Plan:       Diagnoses and all orders for this visit:    Uncontrolled type 2 diabetes mellitus with complication, without long-term current use of insulin  -declines adding another medication today - wants to work more on diet/exercise  -agrees to add a 3rd DM med in Dec if not controlled by then  -+ED, does not like side effects of viagra/dialis  -discussed diet, exercise, wt loss  Follow up in about 12 weeks (around 12/21/2020) for f/u DM, htn.  Labs the week before            Answers for HPI/ROS submitted by the patient on 9/27/2020   Diabetes problem  Diabetes type: type 2  MedicAlert ID: No  Disease duration: 20 years  blurred vision: Yes  foot paresthesias: No  foot ulcerations: No  visual change: No  weight loss: No  Symptom course: stable  hunger: No  mood changes: No  sleepiness: Yes  sweats: Yes  blackouts: No  hospitalization: No  nocturnal hypoglycemia: No  required assistance: No  required glucagon: No  CVA: No  heart disease: No  impotence:  Yes  nephropathy: No  peripheral neuropathy: No  PVD: No  retinopathy: No  autonomic neuropathy: No  CAD risks: hypertension, diabetes mellitus  Current treatments: oral agent (dual therapy)  Treatment compliance: all of the time  Home blood tests: 3-4 x per week  Home urines: <1 x per month  Monitoring compliance: inadequate  Blood glucose trend: decreasing steadily  Weight trend: stable  Current diet: generally healthy  Meal planning: avoidance of concentrated sweets, calorie counting  Exercise: weekly  Dietitian visit: No  Eye exam current: No  Sees podiatrist: No

## 2020-10-05 ENCOUNTER — PATIENT MESSAGE (OUTPATIENT)
Dept: ADMINISTRATIVE | Facility: HOSPITAL | Age: 60
End: 2020-10-05

## 2020-11-23 ENCOUNTER — OFFICE VISIT (OUTPATIENT)
Dept: INTERNAL MEDICINE | Facility: CLINIC | Age: 60
End: 2020-11-23
Payer: COMMERCIAL

## 2020-11-23 ENCOUNTER — HOSPITAL ENCOUNTER (OUTPATIENT)
Dept: RADIOLOGY | Facility: HOSPITAL | Age: 60
Discharge: HOME | End: 2020-11-23
Attending: STUDENT IN AN ORGANIZED HEALTH CARE EDUCATION/TRAINING PROGRAM
Payer: COMMERCIAL

## 2020-11-23 VITALS
BODY MASS INDEX: 31.57 KG/M2 | HEART RATE: 91 BPM | HEIGHT: 71 IN | TEMPERATURE: 100 F | OXYGEN SATURATION: 96 % | WEIGHT: 225.5 LBS | DIASTOLIC BLOOD PRESSURE: 78 MMHG | SYSTOLIC BLOOD PRESSURE: 122 MMHG | RESPIRATION RATE: 16 BRPM

## 2020-11-23 DIAGNOSIS — R06.09 DYSPNEA ON EXERTION: ICD-10-CM

## 2020-11-23 DIAGNOSIS — R06.01 ORTHOPNEA: ICD-10-CM

## 2020-11-23 DIAGNOSIS — R06.09 DYSPNEA ON EXERTION: Primary | ICD-10-CM

## 2020-11-23 DIAGNOSIS — R06.02 SHORTNESS OF BREATH: ICD-10-CM

## 2020-11-23 PROCEDURE — 3074F PR MOST RECENT SYSTOLIC BLOOD PRESSURE < 130 MM HG: ICD-10-PCS | Mod: CPTII,S$GLB,, | Performed by: STUDENT IN AN ORGANIZED HEALTH CARE EDUCATION/TRAINING PROGRAM

## 2020-11-23 PROCEDURE — 99213 OFFICE O/P EST LOW 20 MIN: CPT | Mod: S$GLB,,, | Performed by: STUDENT IN AN ORGANIZED HEALTH CARE EDUCATION/TRAINING PROGRAM

## 2020-11-23 PROCEDURE — 71046 XR CHEST PA AND LATERAL: ICD-10-PCS | Mod: 26,,, | Performed by: RADIOLOGY

## 2020-11-23 PROCEDURE — 3078F DIAST BP <80 MM HG: CPT | Mod: CPTII,S$GLB,, | Performed by: STUDENT IN AN ORGANIZED HEALTH CARE EDUCATION/TRAINING PROGRAM

## 2020-11-23 PROCEDURE — 99213 PR OFFICE/OUTPT VISIT, EST, LEVL III, 20-29 MIN: ICD-10-PCS | Mod: S$GLB,,, | Performed by: STUDENT IN AN ORGANIZED HEALTH CARE EDUCATION/TRAINING PROGRAM

## 2020-11-23 PROCEDURE — 99999 PR PBB SHADOW E&M-EST. PATIENT-LVL V: ICD-10-PCS | Mod: PBBFAC,,, | Performed by: STUDENT IN AN ORGANIZED HEALTH CARE EDUCATION/TRAINING PROGRAM

## 2020-11-23 PROCEDURE — 3074F SYST BP LT 130 MM HG: CPT | Mod: CPTII,S$GLB,, | Performed by: STUDENT IN AN ORGANIZED HEALTH CARE EDUCATION/TRAINING PROGRAM

## 2020-11-23 PROCEDURE — 71046 X-RAY EXAM CHEST 2 VIEWS: CPT | Mod: TC

## 2020-11-23 PROCEDURE — 3078F PR MOST RECENT DIASTOLIC BLOOD PRESSURE < 80 MM HG: ICD-10-PCS | Mod: CPTII,S$GLB,, | Performed by: STUDENT IN AN ORGANIZED HEALTH CARE EDUCATION/TRAINING PROGRAM

## 2020-11-23 PROCEDURE — 99999 PR PBB SHADOW E&M-EST. PATIENT-LVL V: CPT | Mod: PBBFAC,,, | Performed by: STUDENT IN AN ORGANIZED HEALTH CARE EDUCATION/TRAINING PROGRAM

## 2020-11-23 PROCEDURE — 71046 X-RAY EXAM CHEST 2 VIEWS: CPT | Mod: 26,,, | Performed by: RADIOLOGY

## 2020-11-23 NOTE — PROGRESS NOTES
"Clinic Note  11/23/2020      Subjective:       Patient ID:  Frankie is a 60 y.o. male being seen for an urgent care visit.    Chief Complaint: Shortness of breath    HPI: Mr. Slaughter is a 60-year-old obese (BMI 31.46)  male with type 2 diabetes mellitus (on glimepiride & metformin, last hemoglobin A1c two months prior was ~8.3%) and HTN who presents to clinic today for urgent evaluation of shortness of breath. Patient reports over the last month or two he has become increasingly more short of breath stating that he is unable to "catch his wind". He notes that symptoms are often worse with minimal exertion (i.e. yard work such as weeding, edging, cutting the grass, moving things around in the garage in addition unable to participate in elderly league basketball which he was able to do prior without issue) and at night with lying flat. He has denies multiple pillow orthopnea as he is only able to sleep with one pillow for comfort however has some component of orthopnea in that lying down often makes his symptoms of shortness of breath worse and sleeping in a recliner often improves his symptoms although this again is often avoid secondary to comfort. His symptoms have been gradually progressive over the last month (partically in the last 2 weeks) which prompted him to seek urgent medical attention today. He expresses concern for COVID-19 and wishes to be screened today although denies any change in appetite, gustatory or olfactory changes, fever, chills, diarrhea, or diffuse myalgias. He has not tried anything OTC to jostin his symptoms. He was last seen by his PCP, Dr. Deal, via virtual/telemedicine encounter in September of this year. He denies any prior history of asthma or COPD. He quit smoking cigarettes roughly 35 years ago after the birth of his first daughter. He works as  at the [employer] and notes his SOB has began to affect his work although denies any overt chemical or " fume exposures. Associated with his symptoms are wheezing specifically at night when lying down and the house is quit in addition to some intermittent nausea and vomiting which he is uncertain if this is related to his shortness of breath. He has a mostly non-productive cough as well although reports that this has been present for years after being prescribed lisinopril for which he has not taken for quite some time. He notes the cough is often worse following PO intake although denies overt difficulty swallowing (unless he has taken Cilias), difficulty with speech, or GERD related symptoms. He denies any chest pain or chest pressure. Of note, he had a stress ECHO back in 2013 and at that time was negative for ischemia but was noted to have a mildly depressed EF of 45%. In clinic today his vitals are stable and he is saturating +96% on room air speaking in full sentences with no overt respiratory distress.      Review of Systems   Constitutional: Positive for diaphoresis (reports chronic history, not often and not related to current symptoms). Negative for chills, fever, malaise/fatigue and weight loss.   HENT: Negative for congestion, ear discharge, ear pain, hearing loss, sinus pain, sore throat and tinnitus.         Reports history of sinus issues. Currently denies congestion, rhinorrhea, or sinus pain.   Eyes: Negative for blurred vision, double vision, pain, discharge and redness.   Respiratory: Positive for cough, shortness of breath and wheezing. Negative for sputum production.         Dry cough usually. Intermittently productive for clear sputum.    Cardiovascular: Positive for orthopnea. Negative for chest pain, palpitations and leg swelling.   Gastrointestinal: Positive for nausea and vomiting. Negative for abdominal pain, blood in stool, constipation, diarrhea, heartburn and melena.        Intermittent over the last two months. Vomitus described as digested food. Non bilious and non bloody.  "  Genitourinary: Positive for frequency. Negative for dysuria, flank pain and hematuria.   Musculoskeletal: Negative for back pain, falls, joint pain, myalgias and neck pain.   Skin: Negative for itching and rash.   Neurological: Negative for dizziness, speech change, focal weakness, loss of consciousness and headaches.   Psychiatric/Behavioral: Negative for depression. The patient is nervous/anxious and has insomnia.        Medication List with Changes/Refills   Current Medications    AMLODIPINE (NORVASC) 10 MG TABLET    Take 1 tablet (10 mg total) by mouth once daily. Needs an appointment    ATORVASTATIN (LIPITOR) 10 MG TABLET    Take 1 tablet (10 mg total) by mouth once daily.    BLOOD SUGAR DIAGNOSTIC STRP    1 strip by Misc.(Non-Drug; Combo Route) route 5 (five) times daily. Whatever insurance allows    BLOOD-GLUCOSE METER KIT    Use as instructed     whatever insurance allows    GLIMEPIRIDE (AMARYL) 2 MG TABLET    TAKE 1 TABLET (2 MG TOTAL) BY MOUTH 2 (TWO) TIMES DAILY WITH MEALS.    METFORMIN (GLUCOPHAGE) 1000 MG TABLET    Take 1 tablet (1,000 mg total) by mouth 2 (two) times daily with meals.    METHOCARBAMOL (ROBAXIN) 500 MG TAB    Take 500 mg by mouth 2 (two) times daily as needed.    ONETOUCH DELICA LANCETS 30 GAUGE [facility]        TADALAFIL (CIALIS) 20 MG TAB    Take 1 tablet (20 mg total) by mouth every other day.       Patient Active Problem List   Diagnosis    Hypertension    Uncontrolled type 2 diabetes mellitus with complication, without long-term current use of insulin    GERD (gastroesophageal reflux disease)    Sinusitis    ED (erectile dysfunction)    Colon cancer screening    Class 2 severe obesity with serious comorbidity in adult           Objective:      /78 (BP Location: Right arm, Patient Position: Sitting, BP Method: Medium (Manual))   Pulse 91   Temp 99.5 °F (37.5 °C) (Oral)   Resp 16   Ht 5' 11" (1.803 m)   Wt 102.3 kg (225 lb 8.5 oz)   SpO2 96%   BMI 31.46 kg/m² " "  Estimated body mass index is 31.46 kg/m² as calculated from the following:    Height as of this encounter: 5' 11" (1.803 m).    Weight as of this encounter: 102.3 kg (225 lb 8.5 oz).     Physical Exam   Constitutional: He is oriented to person, place, and time and well-developed, well-nourished, and in no distress. No distress.   HENT:   Head: Normocephalic and atraumatic.   Right Ear: External ear normal.   Left Ear: External ear normal.   Nose: Nose normal.   Mouth/Throat: Oropharynx is clear and moist. No oropharyngeal exudate.   Bilateral cerumen impaction noted but without tenderness to otoscope exam or complaints of ear pain.   Eyes: Pupils are equal, round, and reactive to light. Conjunctivae and EOM are normal. Right eye exhibits no discharge. Left eye exhibits no discharge. No scleral icterus.   Arcus senilis bilaterally.   Neck: Normal range of motion. Neck supple. No JVD present. No tracheal deviation present. No thyromegaly present.   No hepatojugular reflex or JVD appreciated on my assessment.   Cardiovascular: Normal rate and intact distal pulses. Exam reveals no gallop and no friction rub.   No murmur heard.  Pulmonary/Chest: Effort normal and breath sounds normal. No respiratory distress. He has no wheezes. He has no rales.   Ausculation of bilateral lung fields were clear.   Abdominal: Soft. Bowel sounds are normal. He exhibits no distension. There is no abdominal tenderness.   Obese/protuberant abdomen.   Musculoskeletal:         General: Edema present.      Comments: +1 bilateral pitting edema up to mid shins.   Neurological: He is alert and oriented to person, place, and time. He exhibits normal muscle tone. Gait normal. Coordination normal.   Skin: Skin is warm and dry. He is not diaphoretic.   Psychiatric: Judgment normal. His mood appears anxious.   Patient appears some what anxious regarding his CHAVEZ.   Nursing note and vitals reviewed.        Assessment and Plan:   Michael was seen today for " shortness of breath.    Diagnoses and all orders for this visit:    Dyspnea on exertion  Orthopnea  Shortness of breath  -     CBC Auto Differential; Future  -     Comprehensive Metabolic Panel; Future  -     BNP; Future  -     Hemoglobin A1C; Future  -     X-Ray Chest PA And Lateral; Future  -     Echo Color Flow Doppler? Yes; Future  -     EKG RHYTHM STRIP (to [place]); Future  -     Ambulatory referral/consult to Cardiology; Future  -     COVID-19 (SARS CoV-2) IgG Antibody; Future  -     SCHEDULED EKG 12-LEAD (to [facility]); Future    Follow Up:   Follow up if symptoms worsen or fail to improve.      Case discussed with Dr. Rodriguez.    Michael Park MD  PGY-2 Internal Medicine

## 2020-11-23 NOTE — PROGRESS NOTES
I have reviewed the chart and the house staff's history and physical, assessment and plan. I have personally discussed the case with house staff and agree with the findings, assessment and plan.  Dyspnea for a month.  No chest pain.  Leg swelling bilateral.  Will get chest x-ray, labs, echo and cardiology referral.  Awaiting today's testing.  Will

## 2020-11-23 NOTE — PATIENT INSTRUCTIONS
Shortness of Breath (Dyspnea)  Shortness of breath is the feeling that you can't catch your breath or get enough air. It is also known as dyspnea.  Dyspnea can be caused by many different conditions. They include:  · Acute asthma attack.  · Worsening of chronic lung diseases such as chronic bronchitis and emphysema.  · Heart failure. This is when weak heart muscle allows extra fluid to collect in the lungs.  · Panic attacks or anxiety. Fear can cause rapid breathing (hyperventilation).  · Pneumonia, or an infection in the lung tissue.  · Exposure to toxic substances, fumes, smoke, or certain medicines.  · Blood clot in the lung (pulmonary embolism). This is often from a piece of blood clot in a deep vein of the leg (deep vein thrombosis) that breaks off and travels to the lungs.  · Heart attack or heart-related chest pain (angina).  · Anemia.  · Collapsed lung (pneumothorax).  · Dehydration.  · Pregnancy.  Based on your visit today, the exact cause of your shortness of breath is not certain. Your tests dont show any of the serious causes of dyspnea. You may need other tests to find out if you have a serious problem. Its important to watch for any new symptoms or symptoms that get worse. Follow up with your healthcare provider as directed.  Home care  Follow these tips to take care of yourself at home:  · When your symptoms are better, go back to your usual activities.  · If you smoke, you should stop. Join a quit-smoking program or ask your healthcare provider for help.  · Eat a healthy diet and get plenty of sleep.  · Get regular exercise. Talk with your healthcare provider before starting to exercise, especially if you have other medical problems.  · Cut down on the amount of caffeine and stimulants you consume.  Follow-up care  Follow up with your healthcare provider, or as advised.  If tests were done, you will be told if your treatment needs to be changed. You can call as directed for the results.  (Note:  If an X-ray was taken, a specialist will review it. You will be notified of any new findings that may affect your care.)  Call 911 or get immediate medical care  Shortness of breath may be a sign of a serious medical problem. For example, it may be a problem with your heart or lungs. Call 911 if you have worsening shortness of breath or trouble breathing, especially with any of the symptoms below:  · You are confused or its difficult to wake you.  · You faint or lose consciousness.  · You have a fast heartbeat, or your heartbeat is irregular.  · You are coughing up blood.  · You have pain in your chest, arm, shoulder, neck, or upper back.  · You break out in a sweat.  When to seek medical advice  Call your healthcare provider right away if any of these occur:  · Slight shortness of breath or wheezing  · Redness, pain or swelling in your leg, arm, or other body area  · Swelling in both legs or ankles  · Fast weight gain  · Dizziness or weakness  · Fever of 100.4ºF (38ºC) or higher, or as directed by your healthcare provider  Date Last Reviewed: 9/13/2015  © 2858-7005 [employer]. [address] [place] [address]. All rights reserved. This information is not intended as a substitute for professional medical care. Always follow your healthcare professional's instructions.      Leg Swelling in Both Legs    Swelling of the feet, ankles, and legs is called edema. It is caused by excess fluid that has collected in the tissues. Extra fluid in the body settles in the lowest part because of gravity. This is why the legs and feet are most affected.  Some of the causes for edema include:  · Disease of the heart like congestive heart failure  · Standing or sitting for long periods of time  · Infection of the feet or legs  · Blood pooling in the veins of your legs (venous insufficiency)  · Dilated veins in your lower leg (varicose veins)  · Garters or other clothing that is tight on your legs. This will  cause blood to pool in your legs because the clothing limits blood flow.  · Some medicines such as hormones like birth control pills, some blood pressure medicines like calcium channel blockers (amlodipine) and steroids, some antidepressants like MAO inhibitors and tricyclics  · Menstrual periods that cause you to retain fluids  · Many types of renal disease  · Liver failure or cirrhosis  · Pregnancy, some swelling is normal, but a sudden increase in leg swelling or weight gain can be a sign of a dangerous complication of pregnancy  · Poor nutrition  · Thyroid disease  Medical treatment will depend on what is causing the swelling in your legs. Your healthcare provider may prescribe water pills (diuretics) to get rid of the extra fluid.  Home care  Follow these guidelines when caring for yourself at home:  · Don't wear clothing like garters that is tight on your legs.  · Keep your legs up while lying or sitting.  · If infection, injury, or recent surgery is causing the swelling, stay off your legs as much as possible until symptoms get better.  · If your healthcare provider says that your leg swelling is caused by venous insufficiency or varicose veins, don't sit or  one place for long periods of time. Take breaks and walk about every few hours. Brisk walking is a good exercise. It helps circulate the blood that has collected in your leg. Talk with your provider about using support stockings to stop daytime leg swelling.  · If your provider says that heart disease is causing your leg swelling, follow a low-salt diet to stop extra fluid from staying in your body. You may also need medicine.  Follow-up care  Follow up with your healthcare provider, or as advised.  When to seek medical advice  Call your healthcare provider right away if any of these occur:  · New shortness of breath or chest pain  · Shortness of breath or chest pain that gets worse  · Swelling in both legs or ankles that gets worse  · Swelling  of the abdomen  · Redness, warmth, or swelling in one leg  · Fever of 100.4ºF (38ºC) or higher, or as directed by your healthcare provider  · Yellow color to your skin or eyes  · Rapid, unexplained weight gain  · Having to sleep upright or use an increased number of pillows  Date Last Reviewed: 3/31/2016  © 0106-7299 [employer]. [address] [place] [address]. All rights reserved. This information is not intended as a substitute for professional medical care. Always follow your healthcare professional's instructions.

## 2020-11-24 ENCOUNTER — PATIENT MESSAGE (OUTPATIENT)
Dept: INTERNAL MEDICINE | Facility: CLINIC | Age: 60
End: 2020-11-24

## 2020-11-24 ENCOUNTER — TELEPHONE (OUTPATIENT)
Dept: INTERNAL MEDICINE | Facility: CLINIC | Age: 60
End: 2020-11-24

## 2020-11-24 NOTE — TELEPHONE ENCOUNTER
Patient contacted via phone number provided in chart ([phone]) regarding recent laboratory studies and CXR. Patient is schedule for ECHO and EKG next Monday. He has referral placed for Cardiology and I believe it is his best interest to follow up with them following his ECHO and EKGs. His BNP is only marginally elevated with a value in the 300s although he is obese. Patient verbalized understanding and all questions answered.

## 2020-11-27 ENCOUNTER — PATIENT OUTREACH (OUTPATIENT)
Dept: ADMINISTRATIVE | Facility: OTHER | Age: 60
End: 2020-11-27

## 2020-11-27 NOTE — PROGRESS NOTES
Chart reviewed.   Immunizations: updated  Orders placed: n/a  Upcoming appts to satisfy HARPREET topics: Hemoglobin A1c 12/14

## 2020-11-30 ENCOUNTER — PATIENT MESSAGE (OUTPATIENT)
Dept: INTERNAL MEDICINE | Facility: CLINIC | Age: 60
End: 2020-11-30

## 2020-11-30 ENCOUNTER — HOSPITAL ENCOUNTER (OUTPATIENT)
Dept: CARDIOLOGY | Facility: HOSPITAL | Age: 60
Discharge: HOME | End: 2020-11-30
Attending: STUDENT IN AN ORGANIZED HEALTH CARE EDUCATION/TRAINING PROGRAM
Payer: COMMERCIAL

## 2020-11-30 ENCOUNTER — OFFICE VISIT (OUTPATIENT)
Dept: CARDIOLOGY | Facility: CLINIC | Age: 60
End: 2020-11-30
Payer: COMMERCIAL

## 2020-11-30 VITALS
BODY MASS INDEX: 31.5 KG/M2 | DIASTOLIC BLOOD PRESSURE: 92 MMHG | HEART RATE: 97 BPM | WEIGHT: 225 LBS | SYSTOLIC BLOOD PRESSURE: 140 MMHG | HEIGHT: 71 IN

## 2020-11-30 VITALS
HEIGHT: 71 IN | WEIGHT: 226 LBS | SYSTOLIC BLOOD PRESSURE: 136 MMHG | BODY MASS INDEX: 31.64 KG/M2 | HEART RATE: 100 BPM | DIASTOLIC BLOOD PRESSURE: 82 MMHG | OXYGEN SATURATION: 97 %

## 2020-11-30 DIAGNOSIS — R06.09 DYSPNEA ON EXERTION: ICD-10-CM

## 2020-11-30 DIAGNOSIS — I10 ESSENTIAL HYPERTENSION: ICD-10-CM

## 2020-11-30 DIAGNOSIS — I48.0 PAROXYSMAL ATRIAL FIBRILLATION: Primary | ICD-10-CM

## 2020-11-30 DIAGNOSIS — I50.41 ACUTE COMBINED SYSTOLIC AND DIASTOLIC HEART FAILURE: ICD-10-CM

## 2020-11-30 PROBLEM — E66.812 CLASS 2 SEVERE OBESITY WITH SERIOUS COMORBIDITY IN ADULT: Status: RESOLVED | Noted: 2020-05-18 | Resolved: 2020-11-30

## 2020-11-30 PROBLEM — E66.01 CLASS 2 SEVERE OBESITY WITH SERIOUS COMORBIDITY IN ADULT: Status: RESOLVED | Noted: 2020-05-18 | Resolved: 2020-11-30

## 2020-11-30 PROBLEM — I48.19 PERSISTENT ATRIAL FIBRILLATION: Status: ACTIVE | Noted: 2020-11-30

## 2020-11-30 LAB
ASCENDING AORTA: 2.89 CM
AV INDEX (PROSTH): 0.67
AV MEAN GRADIENT: 2 MMHG
AV PEAK GRADIENT: 3 MMHG
AV VALVE AREA: 2.77 CM2
AV VELOCITY RATIO: 0.71
BSA FOR ECHO PROCEDURE: 2.26 M2
CV ECHO LV RWT: 0.33 CM
DOP CALC AO PEAK VEL: 0.8 M/S
DOP CALC AO VTI: 14.12 CM
DOP CALC LVOT AREA: 4.2 CM2
DOP CALC LVOT DIAMETER: 2.3 CM
DOP CALC LVOT PEAK VEL: 0.57 M/S
DOP CALC LVOT STROKE VOLUME: 39.16 CM3
DOP CALCLVOT PEAK VEL VTI: 9.43 CM
E WAVE DECELERATION TIME: 129.55 MSEC
E/A RATIO: 3.48
E/E' RATIO: 11.43 M/S
ECHO LV POSTERIOR WALL: 0.88 CM (ref 0.6–1.1)
FRACTIONAL SHORTENING: 14 % (ref 28–44)
INTERVENTRICULAR SEPTUM: 0.86 CM (ref 0.6–1.1)
IVRT: 51.38 MSEC
LA MAJOR: 5.97 CM
LA MINOR: 5.97 CM
LA WIDTH: 5.16 CM
LEFT ATRIUM SIZE: 4.97 CM
LEFT ATRIUM VOLUME INDEX: 58.7 ML/M2
LEFT ATRIUM VOLUME: 130.14 CM3
LEFT INTERNAL DIMENSION IN SYSTOLE: 4.6 CM (ref 2.1–4)
LEFT VENTRICLE DIASTOLIC VOLUME INDEX: 62.75 ML/M2
LEFT VENTRICLE DIASTOLIC VOLUME: 139.12 ML
LEFT VENTRICLE MASS INDEX: 77 G/M2
LEFT VENTRICLE SYSTOLIC VOLUME INDEX: 43.9 ML/M2
LEFT VENTRICLE SYSTOLIC VOLUME: 97.38 ML
LEFT VENTRICULAR INTERNAL DIMENSION IN DIASTOLE: 5.36 CM (ref 3.5–6)
LEFT VENTRICULAR MASS: 170.26 G
LV LATERAL E/E' RATIO: 11.43 M/S
LV SEPTAL E/E' RATIO: 11.43 M/S
MV PEAK A VEL: 0.23 M/S
MV PEAK E VEL: 0.8 M/S
MV STENOSIS PRESSURE HALF TIME: 40.52 MS
MV VALVE AREA P 1/2 METHOD: 5.43 CM2
PISA MRMAX VEL: 0.05 M/S
PISA TR MAX VEL: 2.58 M/S
PULM VEIN S/D RATIO: 0.26
PV PEAK D VEL: 0.9 M/S
PV PEAK S VEL: 0.23 M/S
RA MAJOR: 4.85 CM
RA PRESSURE: 3 MMHG
RA WIDTH: 4.3 CM
RETIRED EF AND QEF - SEE NOTES: 20 %
RIGHT VENTRICULAR END-DIASTOLIC DIMENSION: 3.44 CM
RV TISSUE DOPPLER FREE WALL SYSTOLIC VELOCITY 1 (APICAL 4 CHAMBER VIEW): 12.48 CM/S
SINUS: 3.32 CM
STJ: 2.74 CM
TDI LATERAL: 0.07 M/S
TDI SEPTAL: 0.07 M/S
TDI: 0.07 M/S
TR MAX PG: 27 MMHG
TRICUSPID ANNULAR PLANE SYSTOLIC EXCURSION: 1.1 CM
TV REST PULMONARY ARTERY PRESSURE: 30 MMHG

## 2020-11-30 PROCEDURE — 3075F SYST BP GE 130 - 139MM HG: CPT | Mod: CPTII,S$GLB,, | Performed by: INTERNAL MEDICINE

## 2020-11-30 PROCEDURE — 93000 EKG 12-LEAD: ICD-10-PCS | Mod: S$GLB,,, | Performed by: INTERNAL MEDICINE

## 2020-11-30 PROCEDURE — 99999 PR PBB SHADOW E&M-EST. PATIENT-LVL V: CPT | Mod: PBBFAC,,, | Performed by: INTERNAL MEDICINE

## 2020-11-30 PROCEDURE — 3008F PR BODY MASS INDEX (BMI) DOCUMENTED: ICD-10-PCS | Mod: CPTII,S$GLB,, | Performed by: INTERNAL MEDICINE

## 2020-11-30 PROCEDURE — 93306 TTE W/DOPPLER COMPLETE: CPT | Mod: 26,,, | Performed by: INTERNAL MEDICINE

## 2020-11-30 PROCEDURE — 3051F PR MOST RECENT HEMOGLOBIN A1C LEVEL 7.0 - < 8.0%: ICD-10-PCS | Mod: CPTII,S$GLB,, | Performed by: INTERNAL MEDICINE

## 2020-11-30 PROCEDURE — 93306 TTE W/DOPPLER COMPLETE: CPT

## 2020-11-30 PROCEDURE — 3079F PR MOST RECENT DIASTOLIC BLOOD PRESSURE 80-89 MM HG: ICD-10-PCS | Mod: CPTII,S$GLB,, | Performed by: INTERNAL MEDICINE

## 2020-11-30 PROCEDURE — 1126F PR PAIN SEVERITY QUANTIFIED, NO PAIN PRESENT: ICD-10-PCS | Mod: S$GLB,,, | Performed by: INTERNAL MEDICINE

## 2020-11-30 PROCEDURE — 99204 PR OFFICE/OUTPT VISIT, NEW, LEVL IV, 45-59 MIN: ICD-10-PCS | Mod: S$GLB,,, | Performed by: INTERNAL MEDICINE

## 2020-11-30 PROCEDURE — 93306 ECHO (CUPID ONLY): ICD-10-PCS | Mod: 26,,, | Performed by: INTERNAL MEDICINE

## 2020-11-30 PROCEDURE — 93000 ELECTROCARDIOGRAM COMPLETE: CPT | Mod: S$GLB,,, | Performed by: INTERNAL MEDICINE

## 2020-11-30 PROCEDURE — 99999 PR PBB SHADOW E&M-EST. PATIENT-LVL V: ICD-10-PCS | Mod: PBBFAC,,, | Performed by: INTERNAL MEDICINE

## 2020-11-30 PROCEDURE — 3079F DIAST BP 80-89 MM HG: CPT | Mod: CPTII,S$GLB,, | Performed by: INTERNAL MEDICINE

## 2020-11-30 PROCEDURE — 99204 OFFICE O/P NEW MOD 45 MIN: CPT | Mod: S$GLB,,, | Performed by: INTERNAL MEDICINE

## 2020-11-30 PROCEDURE — 3075F PR MOST RECENT SYSTOLIC BLOOD PRESS GE 130-139MM HG: ICD-10-PCS | Mod: CPTII,S$GLB,, | Performed by: INTERNAL MEDICINE

## 2020-11-30 PROCEDURE — 1126F AMNT PAIN NOTED NONE PRSNT: CPT | Mod: S$GLB,,, | Performed by: INTERNAL MEDICINE

## 2020-11-30 PROCEDURE — 3051F HG A1C>EQUAL 7.0%<8.0%: CPT | Mod: CPTII,S$GLB,, | Performed by: INTERNAL MEDICINE

## 2020-11-30 PROCEDURE — 3008F BODY MASS INDEX DOCD: CPT | Mod: CPTII,S$GLB,, | Performed by: INTERNAL MEDICINE

## 2020-11-30 RX ORDER — VALSARTAN 80 MG/1
80 TABLET ORAL DAILY
Qty: 90 TABLET | Refills: 3 | Status: SHIPPED | OUTPATIENT
Start: 2020-11-30 | End: 2020-12-23

## 2020-11-30 RX ORDER — FUROSEMIDE 40 MG/1
40 TABLET ORAL DAILY
Qty: 90 TABLET | Refills: 3 | Status: SHIPPED | OUTPATIENT
Start: 2020-11-30 | End: 2020-12-23

## 2020-11-30 RX ORDER — METOPROLOL SUCCINATE 25 MG/1
25 TABLET, EXTENDED RELEASE ORAL DAILY
Qty: 90 TABLET | Refills: 3 | Status: SHIPPED | OUTPATIENT
Start: 2020-11-30 | End: 2021-03-03

## 2020-11-30 NOTE — LETTER
November 30, 2020      Michael Park MD  [address]  [facility] [address]           [facility] [address]  [address]  [facility] [address]  Phone: [phone]          Patient: Michael Slaughter Jr.   MR Number: [id number]   YOB: 1960   Date of Visit: 11/30/2020       Dear Dr. Michael Park:    Thank you for referring Michael Slaughter to me for evaluation. Attached you will find relevant portions of my assessment and plan of care.    If you have questions, please do not hesitate to call me. I look forward to following Michael Slaughter along with you.    Sincerely,    Michael Palafox III, MD    Enclosure  CC:  No Recipients    If you would like to receive this communication electronically, please contact [email or web address] or [phone] to request more information on [employer] Link access.    For providers and/or their staff who would like to refer a patient to [facility], please contact us through our one-stop-shop provider referral line, [facility], at [phone].    If you feel you have received this communication in error or would no longer like to receive these types of communications, please e-mail [email or web address]

## 2020-11-30 NOTE — PROGRESS NOTES
Cardiology Clinic Note  Reason for Visit: CHAVEZ    HPI:     Michael Slaughter Jr. is a 60 y.o. M, who presents for dyspnea on exertion.    He was seen by internal med on 11/23/20 for shortness of breath x1-2 months. This was occurring with exertion such as yard work, moving things around the garage, or when attempting to play in an elderly basketball league. He also has dyspnea lying flat, improved by sleeping in a recliner. Labs were obtained on 11/23/20 and revealed A1c 7.7%, Cr 1.2,  (<10 on last check 11y ago), Covid negative. He was referred to cardiology for further evaluation.    Feels like smothering when lying flat. He has a cough. Most significant is his lack of energy and dyspnea with minimal exertion. He does snore. No reported apneic episodes.    Medical: acute systolic heart failure, DM2, HTN, GERD  Surgical: Reviewed, as below.  Family: Reviewed, as below. No premature CAD, HF, SCD.  Social: Reviewed, as below. Former smoker, quit 35y ago. Works as  at the [employer]  Side effects/allergies: lisinopril (cough)    ROS:    Constitution: Negative for fever or chills.  HENT: Negative for  headaches.  Eyes: Negative for blurred vision.   Cardiovascular: See above  Pulmonary: positive for SOB. Negative for cough.   Gastrointestinal: Negative for nausea/vomiting.   : Negative for dysuria.   Skin: Negative for rashes.  Neurological: Negative for focal weakness.  Psychological: Negative for depression.  PMH:     Past Medical History:   Diagnosis Date    Diabetes mellitus type II     GERD (gastroesophageal reflux disease)     Hypertension     Sinusitis      No past surgical history on file.  Allergies:     Review of patient's allergies indicates:   Allergen Reactions    Lisinopril      Other reaction(s): cough     Medications:     Current Outpatient Medications on File Prior to Visit   Medication Sig Dispense Refill    amLODIPine (NORVASC) 10 MG tablet Take 1 tablet (10 mg  "total) by mouth once daily. Needs an appointment 90 tablet 3    atorvastatin (LIPITOR) 10 MG tablet Take 1 tablet (10 mg total) by mouth once daily. 90 tablet 3    blood sugar diagnostic Strp 1 strip by Misc.(Non-Drug; Combo Route) route 5 (five) times daily. Whatever insurance allows 400 each 12    blood-glucose meter kit Use as instructed     whatever insurance allows 1 each 0    glimepiride (AMARYL) 2 MG tablet TAKE 1 TABLET (2 MG TOTAL) BY MOUTH 2 (TWO) TIMES DAILY WITH MEALS. 60 tablet 11    metFORMIN (GLUCOPHAGE) 1000 MG tablet Take 1 tablet (1,000 mg total) by mouth 2 (two) times daily with meals. 180 tablet 3    methocarbamol (ROBAXIN) 500 MG Tab Take 500 mg by mouth 2 (two) times daily as needed.  0    ONETOUCH DELICA LANCETS 30 gauge Misc       tadalafil (CIALIS) 20 MG Tab Take 1 tablet (20 mg total) by mouth every other day. 20 tablet 12     No current facility-administered medications on file prior to visit.      Social History:     Social History     Tobacco Use    Smoking status: Former Smoker     Types: Cigarettes    Smokeless tobacco: Never Used   Substance Use Topics    Alcohol use: Yes     Frequency: Monthly or less     Drinks per session: 1 or 2     Binge frequency: Never     Comment: daily     Family History:     Family History   Problem Relation Age of Onset    Cancer Mother         breast    Birth defects Maternal Grandmother         brain tumor    Heart disease Neg Hx     Stroke Neg Hx     Amblyopia Neg Hx     Blindness Neg Hx     Cataracts Neg Hx     Glaucoma Neg Hx     Hypertension Neg Hx     Macular degeneration Neg Hx     Retinal detachment Neg Hx     Strabismus Neg Hx      Physical Exam:   /82 (BP Location: Left arm, Patient Position: Sitting, BP Method: Medium (Automatic))   Pulse 100   Ht 5' 11" (1.803 m)   Wt 102.5 kg (225 lb 15.5 oz)   SpO2 97%   BMI 31.52 kg/m²      Constitutional: No apparent distress, conversant  HEENT: Sclera anicteric, " extraocular movements intact  Neck: Elevated jugular venous distension, no carotid bruits  CV: Irregularly irregular, rate controlled  Pulm: Clear to auscultation bilaterally  GI: Abdomen soft, no palpable masses  Extremities: No lower extremity edema, warm with palpable pulses  Skin: No ecchymosis, erythema, or ulcers  Psych: Alert and oriented to person place location, appropriate affect  Neuro: No focal deficits    Labs:     Blood Tests:  Lab Results   Component Value Date     (H) 11/23/2020     11/23/2020    K 3.9 11/23/2020     11/23/2020    CO2 27 11/23/2020    BUN 22 11/23/2020    CREATININE 1.2 11/23/2020     (H) 11/23/2020    HGBA1C 7.7 (H) 11/23/2020    AST 22 11/23/2020    ALT 44 11/23/2020    ALBUMIN 4.2 11/23/2020    PROT 7.5 11/23/2020    BILITOT 0.7 11/23/2020    WBC 9.90 11/23/2020    HGB 14.6 11/23/2020    HCT 45.8 11/23/2020    MCV 83 11/23/2020     11/23/2020    PSA 0.52 05/13/2020    TSH 2.438 05/13/2020       Lab Results   Component Value Date    CHOL 128 05/13/2020    HDL 44 05/13/2020    TRIG 121 05/13/2020       Lab Results   Component Value Date    LDLCALC 59.8 (L) 05/13/2020       Urine Tests:  Lab Results   Component Value Date    COLORU Yellow 09/17/2019    APPEARANCEUA Clear 09/17/2019    PHUR 5.0 09/17/2019    SPECGRAV 1.020 09/17/2019    PROTEINUA Negative 09/17/2019    GLUCUA 3+ (A) 09/17/2019    KETONESU Negative 09/17/2019    BILIRUBINUA Negative 09/17/2019    OCCULTUA Negative 09/17/2019    NITRITE Negative 09/17/2019    UROBILINOGEN 0.2 09/18/2009    LEUKOCYTESUR Negative 09/17/2019    PROTEINURINE 17 (H) 05/13/2020    CREATRANDUR 244.0 05/13/2020    UTPCR 0.07 05/13/2020       Imaging:     Echocardiogram  TTE 11/30/20  · Severe left atrial enlargement.  · The left ventricle is normal in size with severely decreased systolic function. The estimated ejection fraction is 25%.  · The quantitatively dervived ejection fraction is 20%.  · Diastolic  pattern consistent with atrial fibrillation observed.  · Normal right ventricular size with normal right ventricular systolic function.  · Moderate mitral regurgitation.  · Normal central venous pressure (3 mmHg).  · The estimated PA systolic pressure is 30 mmHg.    Stress testing  URIEL 13  CONCLUSIONS     1 - Mildly depressed resting left ventricular systolic function (EF 45%).     2 - Normal RV systolic function.     No evidence of stress induced myocardial ischemia.     Cath Lab  None    Other  None    EK17  Normal sinus rhythm  T wave abnormality, consider inferolateral ischemia    20  NSR, PAC, PVC, NSTTA    Assessment:     1. Paroxysmal atrial fibrillation    2. Dyspnea on exertion    3. Acute combined systolic and diastolic heart failure    4. Essential hypertension    5. Uncontrolled type 2 diabetes mellitus with complication, without long-term current use of insulin        Plan:     Persistent atrial fibrillation  Seen on echo 20. [id number] 3  Unclear if afib precipitated CHF or CHF caused afib. Severe LA enlargement on echo. EF 25%.  Start on eliquis 5mg BID, toprol 25mg daily  Refer to EP    Dyspnea on exertion  Acute combined systolic and diastolic heart failure  Start valsartan 80mg daily  BB, as above  Start lasix 40mg daily    Essential hypertension  Stop amlodipine  Changes in meds, as above  Basic labs in 1 month    Uncontrolled type 2 diabetes mellitus with complication, without long-term current use of insulin  Followed by PCP  A1c not at goal, but improving    Signed:  Severo Palafox MD  Cardiology     2020 10:12 AM    Follow-up:     Future Appointments   Date Time Provider Department Center   2020  8:45 AM ECHO, [facility] ECHOSTR [facility]   2020 10:00 AM Michael Palafox III, MD [facility] CARDIO [facility]   2020 10:00 AM LAB, APPOINTMENT [facility] LAB IM [facility]HP   2020  2:15 PM Michael Deal MD [facility]

## 2020-11-30 NOTE — PROGRESS NOTES
Subjective:    Patient ID:  Michael Slaughter Jr. is a 60 y.o. male who presents for evaluation of Atrial Fibrillation and Congestive Heart Failure    Referring Cardiologist: Severo Palafox III, MD  Primary Care Physician: Michael Deal MD    HPI  I had the pleasure of seeing Mr. Sukhjinder Michaels today in our electrophysiology clinic in consultation for his paroxysmal atrial fibrillation and systolic heart failure. As you are aware he is a pleasant 60 year-old man with hypertension and diabetes who was in his usually state of health until he developed heart failure symptoms for the past 1-2 months (dyspnea on exertion, orthopnea). An echocardiogram performed 11/30/2020 noted a systolic function of 25% with severe LA enlargement, moderate MR, and AF. His ECG from the same day noted sinus rhythm with a PVC (RBBB concordant at least through V3, superior axis as assessed in lead II). A stress echocardiogram in 2013 noted a LVEF of 45% without ischemic changes. He was initiated on eliquis, metoprolol and valsartan.    I reviewed available ECGs in Epic which show sinus rhythm.    My interpretation of today's in clinic electrocardiogram is sinsu rhythm with PACs and PVCs (RBBB, V5 transition, rS in II, R in III)    Review of Systems   Constitution: Negative for fever and malaise/fatigue.   HENT: Negative for congestion and sore throat.    Eyes: Negative for blurred vision and visual disturbance.   Cardiovascular: Positive for dyspnea on exertion and orthopnea. Negative for chest pain, irregular heartbeat, near-syncope, palpitations and syncope.   Respiratory: Negative for cough and shortness of breath.    Hematologic/Lymphatic: Negative for bleeding problem. Does not bruise/bleed easily.   Skin: Negative.    Musculoskeletal: Negative.    Gastrointestinal: Negative for bloating, abdominal pain, hematochezia and melena.   Neurological: Negative for focal weakness and weakness.   Psychiatric/Behavioral: Negative.         Objective:     Physical Exam   Constitutional: He is oriented to person, place, and time. He appears well-developed and well-nourished. No distress.   HENT:   Head: Normocephalic and atraumatic.   Eyes: Conjunctivae are normal. Right eye exhibits no discharge. Left eye exhibits no discharge.   Neck: Neck supple.   Cardiovascular: Normal rate and regular rhythm. Exam reveals no gallop and no friction rub.   No murmur heard.  Pulmonary/Chest: Effort normal and breath sounds normal. No respiratory distress. He has no wheezes. He has no rales.   Abdominal: Soft. Bowel sounds are normal. He exhibits no distension. There is no abdominal tenderness.   Musculoskeletal:         General: No edema.   Neurological: He is alert and oriented to person, place, and time.   Skin: Skin is warm and dry. He is not diaphoretic.   Psychiatric: He has a normal mood and affect. His behavior is normal. Judgment and thought content normal.   Vitals reviewed.        Assessment:       1. Paroxysmal atrial fibrillation    2. Essential hypertension    3. Acute combined systolic and diastolic heart failure    4. Dyspnea on exertion         Plan:       In summary, Mr. Slaughter is a pleasant 60 year-old man with hypertension and previous nonischemic mild systolic cardiomyopathy with LVEF of 45% in 2013 now with symptomatic severe systolic heart failure with LVEF of 25% with reported AF noted during his echocardiogram. I had a long discussion with the patient about the pathophysiology and risks of atrial fibrillation and its basic pathophysiology, including its health implications and treatment options. Specifically, I addressed the need for CVA (stroke) prophylaxis with aspirin versus oral anticoagulation (warfarin vs DOACs, discussed bleeding risks, and need to come to the ER for any head trauma for CT scanning even if asymptomatic). His [id number] score is 2 and indefinite anticoagulation is recommended. I also discussed the goal to reduce symptomatic arrhythmic  episodes by pharmacologic and/or procedural methods and utilizing a rhythm versus a rate control strategy. Due to age and low EF recommend rhythm control strategy however I would like to see what his AF burden is first. Would like to start with a [employer] 14 day monitor and then likely will start amiodarone. If no AF then would recommend ILR implant if EF improves with medical therapy. If EF does not improve with GDMT then would discuss ICD implantation.    Will call with SeeWhycharly results. Continue close follow-up with Dr. Palafox for medical therapy titration.    RTC in 2 months    Thank you for allowing me to participate in the care of this patient. Please do not hesitate to call me with any questions or concerns.    Didier Gonzalez MD, PhD  Cardiac Electrophysiology

## 2020-12-01 ENCOUNTER — CLINICAL SUPPORT (OUTPATIENT)
Dept: CARDIOLOGY | Facility: HOSPITAL | Age: 60
End: 2020-12-01
Attending: INTERNAL MEDICINE
Payer: COMMERCIAL

## 2020-12-01 ENCOUNTER — OFFICE VISIT (OUTPATIENT)
Dept: ELECTROPHYSIOLOGY | Facility: CLINIC | Age: 60
End: 2020-12-01
Payer: COMMERCIAL

## 2020-12-01 VITALS
DIASTOLIC BLOOD PRESSURE: 62 MMHG | SYSTOLIC BLOOD PRESSURE: 116 MMHG | BODY MASS INDEX: 31.2 KG/M2 | HEART RATE: 89 BPM | WEIGHT: 222.88 LBS | HEIGHT: 71 IN

## 2020-12-01 DIAGNOSIS — R06.09 DYSPNEA ON EXERTION: ICD-10-CM

## 2020-12-01 DIAGNOSIS — I48.0 PAROXYSMAL ATRIAL FIBRILLATION: ICD-10-CM

## 2020-12-01 DIAGNOSIS — I50.41 ACUTE COMBINED SYSTOLIC AND DIASTOLIC HEART FAILURE: ICD-10-CM

## 2020-12-01 DIAGNOSIS — I10 ESSENTIAL HYPERTENSION: ICD-10-CM

## 2020-12-01 DIAGNOSIS — I48.0 PAROXYSMAL ATRIAL FIBRILLATION: Primary | ICD-10-CM

## 2020-12-01 PROCEDURE — 1126F AMNT PAIN NOTED NONE PRSNT: CPT | Mod: S$GLB,,, | Performed by: INTERNAL MEDICINE

## 2020-12-01 PROCEDURE — 99214 OFFICE O/P EST MOD 30 MIN: CPT | Mod: S$GLB,,, | Performed by: INTERNAL MEDICINE

## 2020-12-01 PROCEDURE — 93010 RHYTHM STRIP: ICD-10-PCS | Mod: S$GLB,,, | Performed by: INTERNAL MEDICINE

## 2020-12-01 PROCEDURE — 1126F PR PAIN SEVERITY QUANTIFIED, NO PAIN PRESENT: ICD-10-PCS | Mod: S$GLB,,, | Performed by: INTERNAL MEDICINE

## 2020-12-01 PROCEDURE — 3008F BODY MASS INDEX DOCD: CPT | Mod: CPTII,S$GLB,, | Performed by: INTERNAL MEDICINE

## 2020-12-01 PROCEDURE — 0298T CV CARDIAC MONITOR - 3-14 DAY ADULT (CUPID ONLY): ICD-10-PCS | Mod: ,,, | Performed by: INTERNAL MEDICINE

## 2020-12-01 PROCEDURE — 3078F PR MOST RECENT DIASTOLIC BLOOD PRESSURE < 80 MM HG: ICD-10-PCS | Mod: CPTII,S$GLB,, | Performed by: INTERNAL MEDICINE

## 2020-12-01 PROCEDURE — 3074F SYST BP LT 130 MM HG: CPT | Mod: CPTII,S$GLB,, | Performed by: INTERNAL MEDICINE

## 2020-12-01 PROCEDURE — 0298T CV CARDIAC MONITOR - 3-14 DAY ADULT (CUPID ONLY): CPT | Mod: ,,, | Performed by: INTERNAL MEDICINE

## 2020-12-01 PROCEDURE — 93010 ELECTROCARDIOGRAM REPORT: CPT | Mod: S$GLB,,, | Performed by: INTERNAL MEDICINE

## 2020-12-01 PROCEDURE — 0296T CV CARDIAC MONITOR - 3-14 DAY ADULT (CUPID ONLY): ICD-10-PCS | Mod: ,,, | Performed by: INTERNAL MEDICINE

## 2020-12-01 PROCEDURE — 3074F PR MOST RECENT SYSTOLIC BLOOD PRESSURE < 130 MM HG: ICD-10-PCS | Mod: CPTII,S$GLB,, | Performed by: INTERNAL MEDICINE

## 2020-12-01 PROCEDURE — 3008F PR BODY MASS INDEX (BMI) DOCUMENTED: ICD-10-PCS | Mod: CPTII,S$GLB,, | Performed by: INTERNAL MEDICINE

## 2020-12-01 PROCEDURE — 0296T CV CARDIAC MONITOR - 3-14 DAY ADULT (CUPID ONLY): CPT | Mod: ,,, | Performed by: INTERNAL MEDICINE

## 2020-12-01 PROCEDURE — 93005 ELECTROCARDIOGRAM TRACING: CPT | Mod: S$GLB,,, | Performed by: STUDENT IN AN ORGANIZED HEALTH CARE EDUCATION/TRAINING PROGRAM

## 2020-12-01 PROCEDURE — 99999 PR PBB SHADOW E&M-EST. PATIENT-LVL III: ICD-10-PCS | Mod: PBBFAC,,, | Performed by: INTERNAL MEDICINE

## 2020-12-01 PROCEDURE — 93005 RHYTHM STRIP: ICD-10-PCS | Mod: S$GLB,,, | Performed by: STUDENT IN AN ORGANIZED HEALTH CARE EDUCATION/TRAINING PROGRAM

## 2020-12-01 PROCEDURE — 3078F DIAST BP <80 MM HG: CPT | Mod: CPTII,S$GLB,, | Performed by: INTERNAL MEDICINE

## 2020-12-01 PROCEDURE — 99999 PR PBB SHADOW E&M-EST. PATIENT-LVL III: CPT | Mod: PBBFAC,,, | Performed by: INTERNAL MEDICINE

## 2020-12-01 PROCEDURE — 99214 PR OFFICE/OUTPT VISIT, EST, LEVL IV, 30-39 MIN: ICD-10-PCS | Mod: S$GLB,,, | Performed by: INTERNAL MEDICINE

## 2020-12-01 NOTE — LETTER
December 1, 2020      Michael Palafox III, MD  [address]  [facility] [address]           [facility]  [address]  [facility] [address]  Phone: [phone]  Fax: [phone]          Patient: Michael Slaughter Jr.   MR Number: [id number]   YOB: 1960   Date of Visit: 12/1/2020       Dear Dr. Michael Palafox III:    Thank you for referring Michael Slaughter to me for evaluation. Attached you will find relevant portions of my assessment and plan of care.    If you have questions, please do not hesitate to call me. I look forward to following Michael Slaughter along with you.    Sincerely,    Didier Gonzalez MD    Enclosure  CC:  No Recipients    If you would like to receive this communication electronically, please contact [email or web address] or [phone] to request more information on [employer] Link access.    For providers and/or their staff who would like to refer a patient to [facility], please contact us through our one-stop-shop provider referral line, [facility], at [phone].    If you feel you have received this communication in error or would no longer like to receive these types of communications, please e-mail [email or web address]

## 2020-12-07 ENCOUNTER — PATIENT MESSAGE (OUTPATIENT)
Dept: ADMINISTRATIVE | Facility: HOSPITAL | Age: 60
End: 2020-12-07

## 2020-12-07 ENCOUNTER — PATIENT OUTREACH (OUTPATIENT)
Dept: ADMINISTRATIVE | Facility: HOSPITAL | Age: 60
End: 2020-12-07

## 2020-12-07 NOTE — PROGRESS NOTES
Health Maintenance Due   Topic Date Due    Shingles Vaccine (2 of 2) 11/12/2019    Foot Exam  09/17/2020    Eye Exam  09/17/2020     Portal message has been sent to patient regarding overdue diabetic eye exam.  Chart review completed.

## 2020-12-14 ENCOUNTER — LAB VISIT (OUTPATIENT)
Dept: LAB | Facility: HOSPITAL | Age: 60
End: 2020-12-14
Attending: FAMILY MEDICINE
Payer: COMMERCIAL

## 2020-12-14 DIAGNOSIS — Z00.00 HEALTHCARE MAINTENANCE: ICD-10-CM

## 2020-12-14 LAB
ANION GAP SERPL CALC-SCNC: 14 MMOL/L (ref 8–16)
BUN SERPL-MCNC: 21 MG/DL (ref 8–23)
CALCIUM SERPL-MCNC: 9.6 MG/DL (ref 8.7–10.5)
CHLORIDE SERPL-SCNC: 100 MMOL/L (ref 95–110)
CO2 SERPL-SCNC: 25 MMOL/L (ref 23–29)
CREAT SERPL-MCNC: 1.2 MG/DL (ref 0.5–1.4)
EST. GFR  (AFRICAN AMERICAN): >60 ML/MIN/1.73 M^2
EST. GFR  (NON AFRICAN AMERICAN): >60 ML/MIN/1.73 M^2
ESTIMATED AVG GLUCOSE: 177 MG/DL (ref 68–131)
GLUCOSE SERPL-MCNC: 143 MG/DL (ref 70–110)
HBA1C MFR BLD HPLC: 7.8 % (ref 4–5.6)
POTASSIUM SERPL-SCNC: 4.2 MMOL/L (ref 3.5–5.1)
SODIUM SERPL-SCNC: 139 MMOL/L (ref 136–145)

## 2020-12-14 PROCEDURE — 83036 HEMOGLOBIN GLYCOSYLATED A1C: CPT

## 2020-12-14 PROCEDURE — 86703 HIV-1/HIV-2 1 RESULT ANTBDY: CPT

## 2020-12-14 PROCEDURE — 80048 BASIC METABOLIC PNL TOTAL CA: CPT

## 2020-12-14 PROCEDURE — 36415 COLL VENOUS BLD VENIPUNCTURE: CPT

## 2020-12-15 ENCOUNTER — PATIENT MESSAGE (OUTPATIENT)
Dept: INTERNAL MEDICINE | Facility: CLINIC | Age: 60
End: 2020-12-15

## 2020-12-15 LAB — HIV 1+2 AB+HIV1 P24 AG SERPL QL IA: NEGATIVE

## 2020-12-16 ENCOUNTER — PATIENT MESSAGE (OUTPATIENT)
Dept: CARDIOLOGY | Facility: CLINIC | Age: 60
End: 2020-12-16

## 2020-12-17 ENCOUNTER — PATIENT MESSAGE (OUTPATIENT)
Dept: CARDIOLOGY | Facility: CLINIC | Age: 60
End: 2020-12-17

## 2020-12-17 NOTE — PROGRESS NOTES
Cardiology Clinic Note  Reason for Visit: CHAVEZ    HPI:     Michael Slaughter Jr. is a 60 y.o. M, who presents for f/u dyspnea on exertion.    At our last visit, he reported orthopnea, fatigue, CHAVEZ for 1-2 months. , Cr 1.2, A1c 7.7%. Echo with EF 25%. He was also noted to be in afib. He was started on anticoagulation, continued BB, and started ARB.    He has been feeling well since our last visit.  His symptoms of orthopnea and dyspnea have improved.   Resting much easier/better. Cough improved.  No symptoms of ischemia, arrhythmia.    Medical: HFrEF (25%), DM2, HTN, GERD  Surgical: Reviewed, as below.  Family: Reviewed, as below. No premature CAD, HF, SCD.  Social: Reviewed, as below. Former smoker, quit 35y ago. Works as  at the [employer]  Side effects/allergies: lisinopril (cough)    ROS:    Constitution: Negative for fever or chills.  HENT: Negative for  headaches.  Eyes: Negative for blurred vision.   Cardiovascular: See above  Pulmonary: Improved SOB. Negative for cough.   Gastrointestinal: Negative for nausea/vomiting.   : Negative for dysuria.   Skin: Negative for rashes.  Neurological: Negative for focal weakness.  Psychological: Negative for depression.  PMH:     Past Medical History:   Diagnosis Date    Diabetes mellitus type II     GERD (gastroesophageal reflux disease)     Hypertension     Sinusitis      No past surgical history on file.  Allergies:     Review of patient's allergies indicates:   Allergen Reactions    Lisinopril      Other reaction(s): cough     Medications:     Current Outpatient Medications on File Prior to Visit   Medication Sig Dispense Refill    amLODIPine (NORVASC) 10 MG tablet Take 1 tablet (10 mg total) by mouth once daily. Needs an appointment (Patient not taking: Reported on 12/1/2020) 90 tablet 3    apixaban (ELIQUIS) 5 mg Tab Take 1 tablet (5 mg total) by mouth 2 (two) times daily. 60 tablet 11    atorvastatin (LIPITOR) 10 MG tablet  "Take 1 tablet (10 mg total) by mouth once daily. 90 tablet 3    blood sugar diagnostic Strp 1 strip by Misc.(Non-Drug; Combo Route) route 5 (five) times daily. Whatever insurance allows 400 each 12    blood-glucose meter kit Use as instructed     whatever insurance allows 1 each 0    furosemide (LASIX) 40 MG tablet Take 1 tablet (40 mg total) by mouth once daily. 90 tablet 3    glimepiride (AMARYL) 2 MG tablet TAKE 1 TABLET (2 MG TOTAL) BY MOUTH 2 (TWO) TIMES DAILY WITH MEALS. 60 tablet 11    metFORMIN (GLUCOPHAGE) 1000 MG tablet Take 1 tablet (1,000 mg total) by mouth 2 (two) times daily with meals. 180 tablet 3    methocarbamol (ROBAXIN) 500 MG Tab Take 500 mg by mouth 2 (two) times daily as needed.  0    metoprolol succinate (TOPROL-XL) 25 MG 24 hr tablet Take 1 tablet (25 mg total) by mouth once daily. 90 tablet 3    ONETOUCH DELICA LANCETS 30 gauge Misc       tadalafil (CIALIS) 20 MG Tab Take 1 tablet (20 mg total) by mouth every other day. 20 tablet 12    valsartan (DIOVAN) 80 MG tablet Take 1 tablet (80 mg total) by mouth once daily. 90 tablet 3     No current facility-administered medications on file prior to visit.      Social History:     Social History     Tobacco Use    Smoking status: Former Smoker     Types: Cigarettes    Smokeless tobacco: Never Used   Substance Use Topics    Alcohol use: Yes     Frequency: Monthly or less     Drinks per session: 1 or 2     Binge frequency: Never     Comment: daily     Family History:     Family History   Problem Relation Age of Onset    Cancer Mother         breast    Birth defects Maternal Grandmother         brain tumor    Heart disease Neg Hx     Stroke Neg Hx     Amblyopia Neg Hx     Blindness Neg Hx     Cataracts Neg Hx     Glaucoma Neg Hx     Hypertension Neg Hx     Macular degeneration Neg Hx     Retinal detachment Neg Hx     Strabismus Neg Hx      Physical Exam:   /86   Pulse 84   Ht 5' 11" (1.803 m)   Wt 102.4 kg (225 lb 12 " oz)   SpO2 98%   BMI 31.49 kg/m²      Constitutional: No apparent distress, conversant  HEENT: Sclera anicteric, extraocular movements intact  Neck: No jugular venous distension, no carotid bruits  CV: Regular rate and rhythm, no ectopy, 1/6 holosystolic murmur at apex  Pulm: Clear to auscultation bilaterally  GI: Abdomen soft, no palpable masses  Extremities: No lower extremity edema, warm with palpable pulses  Skin: No ecchymosis, erythema, or ulcers  Psych: Alert and oriented to person place location, appropriate affect  Neuro: No focal deficits    Labs:     Blood Tests:  Lab Results   Component Value Date     (H) 11/23/2020     12/14/2020    K 4.2 12/14/2020     12/14/2020    CO2 25 12/14/2020    BUN 21 12/14/2020    CREATININE 1.2 12/14/2020     (H) 12/14/2020    HGBA1C 7.8 (H) 12/14/2020    AST 22 11/23/2020    ALT 44 11/23/2020    ALBUMIN 4.2 11/23/2020    PROT 7.5 11/23/2020    BILITOT 0.7 11/23/2020    WBC 9.90 11/23/2020    HGB 14.6 11/23/2020    HCT 45.8 11/23/2020    MCV 83 11/23/2020     11/23/2020    PSA 0.52 05/13/2020    TSH 2.438 05/13/2020       Lab Results   Component Value Date    CHOL 128 05/13/2020    HDL 44 05/13/2020    TRIG 121 05/13/2020       Lab Results   Component Value Date    LDLCALC 59.8 (L) 05/13/2020       Urine Tests:  Lab Results   Component Value Date    COLORU Yellow 09/17/2019    APPEARANCEUA Clear 09/17/2019    PHUR 5.0 09/17/2019    SPECGRAV 1.020 09/17/2019    PROTEINUA Negative 09/17/2019    GLUCUA 3+ (A) 09/17/2019    KETONESU Negative 09/17/2019    BILIRUBINUA Negative 09/17/2019    OCCULTUA Negative 09/17/2019    NITRITE Negative 09/17/2019    UROBILINOGEN 0.2 09/18/2009    LEUKOCYTESUR Negative 09/17/2019    PROTEINURINE 17 (H) 05/13/2020    CREATRANDUR 244.0 05/13/2020    UTPCR 0.07 05/13/2020       Imaging:     Echocardiogram  TTE 11/30/20  · Severe left atrial enlargement.  · The left ventricle is normal in size with severely  decreased systolic function. The estimated ejection fraction is 25%.  · The quantitatively dervived ejection fraction is 20%.  · Diastolic pattern consistent with atrial fibrillation observed.  · Normal right ventricular size with normal right ventricular systolic function.  · Moderate mitral regurgitation.  · Normal central venous pressure (3 mmHg).  · The estimated PA systolic pressure is 30 mmHg.    Stress testing  URIEL 13  CONCLUSIONS     1 - Mildly depressed resting left ventricular systolic function (EF 45%).     2 - Normal RV systolic function.     No evidence of stress induced myocardial ischemia.     Cath Lab  None    Other  None    EK17  Normal sinus rhythm  T wave abnormality, consider inferolateral ischemia    20  NSR, PAC, PVC, NSTTA    Assessment:     1. Chronic combined systolic and diastolic heart failure    2. Essential hypertension    3. Paroxysmal atrial fibrillation    4. Type 2 diabetes mellitus without complication, without long-term current use of insulin      Plan:     Chronic systolic heart failure  Euvolemic, stable  Continue toprol  Stop valsartan.   Start entresto 49-51mg BID. BMP in 1-2 weeks  Change lasix to 40mg prn for weight gain  Discussed low sodium diet, daily weights  Obtain SPECT for ischemic evaluation (resting wall motion abnormalities on echo)    After titrating meds to goal, will repeat echo after 3 months  Discussed possibility of ICD if EF remains <35%    Essential hypertension  BP borderline  ARB change, as above     Paroxysmal atrial fibrillation  Seen on echo 20. [id number] 3 (CHF, DM, HTN)  Cont eliquis 5mg BID, toprol 25mg daily  14 day monitor with ~4% AF/AFl burden  Followed by EP, Dr Gonzalez    Uncontrolled type 2 diabetes mellitus with complication, without long-term current use of insulin  Followed by PCP  A1c not at goal, but improving (7.8% on last check)  LDL at goal. Cont statin.      I spent 50 minutes face-to-face with the  patient, over half in discussion of the diagnosis and the importance of compliance with the treatment plan.    Signed:   Severo Palafox MD  Cardiology     12/23/2020 9:20 AM    Follow-up:     Future Appointments   Date Time Provider Department Center   12/21/2020  7:45 AM LAB, SAME DAY [facility] LAB  [facility]   12/21/2020  2:15 PM Michael Deal MD [facility]   12/23/2020  4:30 PM Michael Palafox III, MD [facility] CARDIO [facility]   2/25/2021  8:20 AM Didier Gonzalez MD [facility] ARRHYTH [facility]

## 2020-12-21 ENCOUNTER — LAB VISIT (OUTPATIENT)
Dept: LAB | Facility: HOSPITAL | Age: 60
End: 2020-12-21
Attending: INTERNAL MEDICINE
Payer: COMMERCIAL

## 2020-12-21 ENCOUNTER — PATIENT MESSAGE (OUTPATIENT)
Dept: CARDIOLOGY | Facility: CLINIC | Age: 60
End: 2020-12-21

## 2020-12-21 ENCOUNTER — TELEPHONE (OUTPATIENT)
Dept: INTERNAL MEDICINE | Facility: CLINIC | Age: 60
End: 2020-12-21

## 2020-12-21 ENCOUNTER — PATIENT MESSAGE (OUTPATIENT)
Dept: INTERNAL MEDICINE | Facility: CLINIC | Age: 60
End: 2020-12-21

## 2020-12-21 ENCOUNTER — OFFICE VISIT (OUTPATIENT)
Dept: INTERNAL MEDICINE | Facility: CLINIC | Age: 60
End: 2020-12-21
Payer: COMMERCIAL

## 2020-12-21 VITALS
BODY MASS INDEX: 31.64 KG/M2 | SYSTOLIC BLOOD PRESSURE: 132 MMHG | DIASTOLIC BLOOD PRESSURE: 80 MMHG | HEIGHT: 71 IN | WEIGHT: 226 LBS | HEART RATE: 92 BPM | OXYGEN SATURATION: 95 %

## 2020-12-21 DIAGNOSIS — I10 ESSENTIAL HYPERTENSION: Primary | ICD-10-CM

## 2020-12-21 DIAGNOSIS — I50.41 ACUTE COMBINED SYSTOLIC AND DIASTOLIC HEART FAILURE: ICD-10-CM

## 2020-12-21 DIAGNOSIS — Z23 NEED FOR SHINGLES VACCINE: Primary | ICD-10-CM

## 2020-12-21 DIAGNOSIS — I10 ESSENTIAL HYPERTENSION: ICD-10-CM

## 2020-12-21 DIAGNOSIS — E11.9 TYPE 2 DIABETES MELLITUS WITHOUT COMPLICATION, WITHOUT LONG-TERM CURRENT USE OF INSULIN: ICD-10-CM

## 2020-12-21 DIAGNOSIS — I48.0 PAROXYSMAL ATRIAL FIBRILLATION: ICD-10-CM

## 2020-12-21 DIAGNOSIS — Z79.01 ANTICOAGULATED: ICD-10-CM

## 2020-12-21 LAB
ALBUMIN SERPL BCP-MCNC: 4 G/DL (ref 3.5–5.2)
ALP SERPL-CCNC: 48 U/L (ref 55–135)
ALT SERPL W/O P-5'-P-CCNC: 16 U/L (ref 10–44)
ANION GAP SERPL CALC-SCNC: 10 MMOL/L (ref 8–16)
AST SERPL-CCNC: 12 U/L (ref 10–40)
BILIRUB SERPL-MCNC: 0.5 MG/DL (ref 0.1–1)
BUN SERPL-MCNC: 20 MG/DL (ref 8–23)
CALCIUM SERPL-MCNC: 9.8 MG/DL (ref 8.7–10.5)
CHLORIDE SERPL-SCNC: 101 MMOL/L (ref 95–110)
CHOLEST SERPL-MCNC: 136 MG/DL (ref 120–199)
CHOLEST/HDLC SERPL: 3.1 {RATIO} (ref 2–5)
CO2 SERPL-SCNC: 32 MMOL/L (ref 23–29)
CREAT SERPL-MCNC: 1.3 MG/DL (ref 0.5–1.4)
EST. GFR  (AFRICAN AMERICAN): >60 ML/MIN/1.73 M^2
EST. GFR  (NON AFRICAN AMERICAN): 58.5 ML/MIN/1.73 M^2
GLUCOSE SERPL-MCNC: 158 MG/DL (ref 70–110)
HDLC SERPL-MCNC: 44 MG/DL (ref 40–75)
HDLC SERPL: 32.4 % (ref 20–50)
LDLC SERPL CALC-MCNC: 67.8 MG/DL (ref 63–159)
NONHDLC SERPL-MCNC: 92 MG/DL
POTASSIUM SERPL-SCNC: 4.4 MMOL/L (ref 3.5–5.1)
PROT SERPL-MCNC: 7.2 G/DL (ref 6–8.4)
SODIUM SERPL-SCNC: 143 MMOL/L (ref 136–145)
TRIGL SERPL-MCNC: 121 MG/DL (ref 30–150)

## 2020-12-21 PROCEDURE — 80053 COMPREHEN METABOLIC PANEL: CPT

## 2020-12-21 PROCEDURE — 3075F PR MOST RECENT SYSTOLIC BLOOD PRESS GE 130-139MM HG: ICD-10-PCS | Mod: CPTII,S$GLB,, | Performed by: FAMILY MEDICINE

## 2020-12-21 PROCEDURE — 99999 PR PBB SHADOW E&M-EST. PATIENT-LVL IV: ICD-10-PCS | Mod: PBBFAC,,, | Performed by: FAMILY MEDICINE

## 2020-12-21 PROCEDURE — 99214 PR OFFICE/OUTPT VISIT, EST, LEVL IV, 30-39 MIN: ICD-10-PCS | Mod: S$GLB,,, | Performed by: FAMILY MEDICINE

## 2020-12-21 PROCEDURE — 3079F DIAST BP 80-89 MM HG: CPT | Mod: CPTII,S$GLB,, | Performed by: FAMILY MEDICINE

## 2020-12-21 PROCEDURE — 1126F AMNT PAIN NOTED NONE PRSNT: CPT | Mod: S$GLB,,, | Performed by: FAMILY MEDICINE

## 2020-12-21 PROCEDURE — 3008F BODY MASS INDEX DOCD: CPT | Mod: CPTII,S$GLB,, | Performed by: FAMILY MEDICINE

## 2020-12-21 PROCEDURE — 36415 COLL VENOUS BLD VENIPUNCTURE: CPT

## 2020-12-21 PROCEDURE — 99999 PR PBB SHADOW E&M-EST. PATIENT-LVL IV: CPT | Mod: PBBFAC,,, | Performed by: FAMILY MEDICINE

## 2020-12-21 PROCEDURE — 3051F PR MOST RECENT HEMOGLOBIN A1C LEVEL 7.0 - < 8.0%: ICD-10-PCS | Mod: CPTII,S$GLB,, | Performed by: FAMILY MEDICINE

## 2020-12-21 PROCEDURE — 99214 OFFICE O/P EST MOD 30 MIN: CPT | Mod: S$GLB,,, | Performed by: FAMILY MEDICINE

## 2020-12-21 PROCEDURE — 3079F PR MOST RECENT DIASTOLIC BLOOD PRESSURE 80-89 MM HG: ICD-10-PCS | Mod: CPTII,S$GLB,, | Performed by: FAMILY MEDICINE

## 2020-12-21 PROCEDURE — 3051F HG A1C>EQUAL 7.0%<8.0%: CPT | Mod: CPTII,S$GLB,, | Performed by: FAMILY MEDICINE

## 2020-12-21 PROCEDURE — 3075F SYST BP GE 130 - 139MM HG: CPT | Mod: CPTII,S$GLB,, | Performed by: FAMILY MEDICINE

## 2020-12-21 PROCEDURE — 80061 LIPID PANEL: CPT

## 2020-12-21 PROCEDURE — 1126F PR PAIN SEVERITY QUANTIFIED, NO PAIN PRESENT: ICD-10-PCS | Mod: S$GLB,,, | Performed by: FAMILY MEDICINE

## 2020-12-21 PROCEDURE — 3008F PR BODY MASS INDEX (BMI) DOCUMENTED: ICD-10-PCS | Mod: CPTII,S$GLB,, | Performed by: FAMILY MEDICINE

## 2020-12-21 NOTE — PROGRESS NOTES
Subjective:       Patient ID: Michael Slaughter Jr. is a 60 y.o. male.    Chief Complaint: Follow-up    Patient is here for follow-up of their chronic diseases  Has CHF (EF 25%) & Afib on eliquis, followed by EPS & cardiology    \  Diabetes Management Status    Statin: Taking  ACE/ARB: Taking    Screening or Prevention Patient's value Goal Complete/Controlled?   HgA1C Testing and Control   Lab Results   Component Value Date    HGBA1C 7.8 (H) 12/14/2020      Annually/Less than 8% Yes   Lipid profile : 05/13/2020 Annually Yes   LDL control Lab Results   Component Value Date    LDLCALC 67.8 12/21/2020    Annually/Less than 100 mg/dl  Yes   Nephropathy screening Lab Results   Component Value Date    LABMICR 22.0 09/17/2019     Lab Results   Component Value Date    PROTEINUA Negative 09/17/2019    Annually No   Blood pressure BP Readings from Last 1 Encounters:   12/21/20 132/80    Less than 140/90 Yes   Dilated retinal exam : 09/17/2019 Annually Yes   Foot exam   : 09/17/2019 Annually No         Social History     Tobacco Use    Smoking status: Former Smoker     Types: Cigarettes    Smokeless tobacco: Never Used   Substance Use Topics    Alcohol use: Yes     Frequency: Monthly or less     Drinks per session: 1 or 2     Binge frequency: Never     Comment: daily    Drug use: No       Family History   Problem Relation Age of Onset    Cancer Mother         breast    Birth defects Maternal Grandmother         brain tumor    Heart disease Neg Hx     Stroke Neg Hx     Amblyopia Neg Hx     Blindness Neg Hx     Cataracts Neg Hx     Glaucoma Neg Hx     Hypertension Neg Hx     Macular degeneration Neg Hx     Retinal detachment Neg Hx     Strabismus Neg Hx        No past surgical history on file.    Patient Active Problem List   Diagnosis    Essential hypertension    Uncontrolled type 2 diabetes mellitus with complication, without long-term current use of insulin    GERD (gastroesophageal reflux disease)     Sinusitis    ED (erectile dysfunction)    Colon cancer screening    Paroxysmal atrial fibrillation    Acute combined systolic and diastolic heart failure    Anticoagulated       Current Outpatient Medications on File Prior to Visit   Medication Sig Dispense Refill    apixaban (ELIQUIS) 5 mg Tab Take 1 tablet (5 mg total) by mouth 2 (two) times daily. 60 tablet 11    atorvastatin (LIPITOR) 10 MG tablet Take 1 tablet (10 mg total) by mouth once daily. 90 tablet 3    blood sugar diagnostic Strp 1 strip by Misc.(Non-Drug; Combo Route) route 5 (five) times daily. Whatever insurance allows 400 each 12    furosemide (LASIX) 40 MG tablet Take 1 tablet (40 mg total) by mouth once daily. 90 tablet 3    glimepiride (AMARYL) 2 MG tablet TAKE 1 TABLET (2 MG TOTAL) BY MOUTH 2 (TWO) TIMES DAILY WITH MEALS. 60 tablet 11    metFORMIN (GLUCOPHAGE) 1000 MG tablet Take 1 tablet (1,000 mg total) by mouth 2 (two) times daily with meals. 180 tablet 3    methocarbamol (ROBAXIN) 500 MG Tab Take 500 mg by mouth 2 (two) times daily as needed.  0    metoprolol succinate (TOPROL-XL) 25 MG 24 hr tablet Take 1 tablet (25 mg total) by mouth once daily. 90 tablet 3    ONETOUCH DELICA LANCETS 30 gauge Misc       tadalafil (CIALIS) 20 MG Tab Take 1 tablet (20 mg total) by mouth every other day. 20 tablet 12    valsartan (DIOVAN) 80 MG tablet Take 1 tablet (80 mg total) by mouth once daily. 90 tablet 3    amLODIPine (NORVASC) 10 MG tablet Take 1 tablet (10 mg total) by mouth once daily. Needs an appointment (Patient not taking: Reported on 12/21/2020) 90 tablet 3    blood-glucose meter kit Use as instructed     whatever insurance allows 1 each 0     No current facility-administered medications on file prior to visit.            Review of Systems   Constitutional: Negative for chills and fever.   HENT: Negative for ear pain.    Eyes: Negative for pain.   Respiratory: Negative for chest tightness.    Cardiovascular: Negative for chest  "pain.   Gastrointestinal: Negative for abdominal pain.   Genitourinary: Negative for flank pain.   Musculoskeletal: Negative for gait problem.   Neurological: Negative for syncope.   Psychiatric/Behavioral: Negative for behavioral problems.       Objective:      Physical Exam  Constitutional:       Appearance: He is well-developed.   HENT:      Head: Normocephalic and atraumatic.   Neck:      Musculoskeletal: Neck supple.   Cardiovascular:      Rate and Rhythm: Normal rate.   Pulmonary:      Effort: No respiratory distress.      Breath sounds: Normal breath sounds. No wheezing or rales.   Abdominal:      Palpations: Abdomen is soft.   Musculoskeletal:      Right foot: Normal range of motion. No deformity, bunion, Charcot foot, foot drop or prominent metatarsal heads.      Left foot: Normal range of motion. No deformity, bunion, Charcot foot, foot drop or prominent metatarsal heads.   Feet:      Right foot:      Protective Sensation: 5 sites tested. 5 sites sensed.      Skin integrity: Skin integrity normal.      Left foot:      Protective Sensation: 5 sites tested. 4 sites sensed.      Skin integrity: Skin integrity normal.   Skin:     General: Skin is dry.   Neurological:      Mental Status: He is alert.   Psychiatric:         Behavior: Behavior normal.         Assessment:       1. Essential hypertension    2. Acute combined systolic and diastolic heart failure    3. Type 2 diabetes mellitus without complication, without long-term current use of insulin    4. Paroxysmal atrial fibrillation    5. Anticoagulated        Plan:       Michael was seen today for follow-up.    Diagnoses and all orders for this visit:    Essential hypertension  /80 (BP Location: Right arm, Patient Position: Sitting, BP Method: Medium (Manual))   Pulse 92   Ht 5' 11" (1.803 m)   Wt 102.5 kg (225 lb 15.5 oz)   SpO2 95%   BMI 31.52 kg/m²   -controlled, stable, no change in meds    Acute combined systolic and diastolic heart " failure  -followed by cardiology / EPS    Type 2 diabetes mellitus without complication, without long-term current use of insulin    - A1c 7.8 is a bit higher than age goal of in the low7s, not enough o increase meds; discussed more work on diet and exercise and weight loss    Paroxysmal atrial fibrillation  Anticoagulated  -followed by cardiology / EPS      Follow up in about 4 months (around 4/28/2021) for dm, htn.

## 2020-12-23 ENCOUNTER — TELEPHONE (OUTPATIENT)
Dept: ELECTROPHYSIOLOGY | Facility: CLINIC | Age: 60
End: 2020-12-23

## 2020-12-23 ENCOUNTER — OFFICE VISIT (OUTPATIENT)
Dept: CARDIOLOGY | Facility: CLINIC | Age: 60
End: 2020-12-23
Payer: COMMERCIAL

## 2020-12-23 VITALS
WEIGHT: 225.75 LBS | DIASTOLIC BLOOD PRESSURE: 86 MMHG | HEART RATE: 84 BPM | SYSTOLIC BLOOD PRESSURE: 130 MMHG | HEIGHT: 71 IN | OXYGEN SATURATION: 98 % | BODY MASS INDEX: 31.6 KG/M2

## 2020-12-23 DIAGNOSIS — I50.42 CHRONIC COMBINED SYSTOLIC AND DIASTOLIC HEART FAILURE: ICD-10-CM

## 2020-12-23 DIAGNOSIS — I50.41 ACUTE COMBINED SYSTOLIC AND DIASTOLIC HEART FAILURE: Primary | ICD-10-CM

## 2020-12-23 DIAGNOSIS — E11.9 TYPE 2 DIABETES MELLITUS WITHOUT COMPLICATION, WITHOUT LONG-TERM CURRENT USE OF INSULIN: ICD-10-CM

## 2020-12-23 DIAGNOSIS — I47.29 NONSUSTAINED VENTRICULAR TACHYCARDIA: Primary | ICD-10-CM

## 2020-12-23 DIAGNOSIS — I48.0 PAROXYSMAL ATRIAL FIBRILLATION: ICD-10-CM

## 2020-12-23 DIAGNOSIS — I10 ESSENTIAL HYPERTENSION: ICD-10-CM

## 2020-12-23 PROCEDURE — 99215 OFFICE O/P EST HI 40 MIN: CPT | Mod: S$GLB,,, | Performed by: INTERNAL MEDICINE

## 2020-12-23 PROCEDURE — 3079F PR MOST RECENT DIASTOLIC BLOOD PRESSURE 80-89 MM HG: ICD-10-PCS | Mod: CPTII,S$GLB,, | Performed by: INTERNAL MEDICINE

## 2020-12-23 PROCEDURE — 3075F SYST BP GE 130 - 139MM HG: CPT | Mod: CPTII,S$GLB,, | Performed by: INTERNAL MEDICINE

## 2020-12-23 PROCEDURE — 3008F PR BODY MASS INDEX (BMI) DOCUMENTED: ICD-10-PCS | Mod: CPTII,S$GLB,, | Performed by: INTERNAL MEDICINE

## 2020-12-23 PROCEDURE — 3051F HG A1C>EQUAL 7.0%<8.0%: CPT | Mod: CPTII,S$GLB,, | Performed by: INTERNAL MEDICINE

## 2020-12-23 PROCEDURE — 3079F DIAST BP 80-89 MM HG: CPT | Mod: CPTII,S$GLB,, | Performed by: INTERNAL MEDICINE

## 2020-12-23 PROCEDURE — 99999 PR PBB SHADOW E&M-EST. PATIENT-LVL IV: CPT | Mod: PBBFAC,,, | Performed by: INTERNAL MEDICINE

## 2020-12-23 PROCEDURE — 1126F AMNT PAIN NOTED NONE PRSNT: CPT | Mod: S$GLB,,, | Performed by: INTERNAL MEDICINE

## 2020-12-23 PROCEDURE — 3051F PR MOST RECENT HEMOGLOBIN A1C LEVEL 7.0 - < 8.0%: ICD-10-PCS | Mod: CPTII,S$GLB,, | Performed by: INTERNAL MEDICINE

## 2020-12-23 PROCEDURE — 99999 PR PBB SHADOW E&M-EST. PATIENT-LVL IV: ICD-10-PCS | Mod: PBBFAC,,, | Performed by: INTERNAL MEDICINE

## 2020-12-23 PROCEDURE — 3008F BODY MASS INDEX DOCD: CPT | Mod: CPTII,S$GLB,, | Performed by: INTERNAL MEDICINE

## 2020-12-23 PROCEDURE — 99215 PR OFFICE/OUTPT VISIT, EST, LEVL V, 40-54 MIN: ICD-10-PCS | Mod: S$GLB,,, | Performed by: INTERNAL MEDICINE

## 2020-12-23 PROCEDURE — 3075F PR MOST RECENT SYSTOLIC BLOOD PRESS GE 130-139MM HG: ICD-10-PCS | Mod: CPTII,S$GLB,, | Performed by: INTERNAL MEDICINE

## 2020-12-23 PROCEDURE — 1126F PR PAIN SEVERITY QUANTIFIED, NO PAIN PRESENT: ICD-10-PCS | Mod: S$GLB,,, | Performed by: INTERNAL MEDICINE

## 2020-12-23 RX ORDER — AMIODARONE HYDROCHLORIDE 400 MG/1
400 TABLET ORAL 2 TIMES DAILY
Qty: 28 TABLET | Refills: 0 | Status: SHIPPED | OUTPATIENT
Start: 2020-12-23 | End: 2021-01-06

## 2020-12-23 RX ORDER — SACUBITRIL AND VALSARTAN 49; 51 MG/1; MG/1
1 TABLET, FILM COATED ORAL 2 TIMES DAILY
Qty: 60 TABLET | Refills: 11 | Status: SHIPPED | OUTPATIENT
Start: 2020-12-23 | End: 2020-12-29 | Stop reason: SDUPTHER

## 2020-12-23 RX ORDER — FUROSEMIDE 40 MG/1
40 TABLET ORAL DAILY PRN
Qty: 90 TABLET | Refills: 3 | Status: SHIPPED | OUTPATIENT
Start: 2020-12-23 | End: 2023-03-27

## 2020-12-23 RX ORDER — AMIODARONE HYDROCHLORIDE 200 MG/1
200 TABLET ORAL DAILY
Qty: 30 TABLET | Refills: 11 | Status: SHIPPED | OUTPATIENT
Start: 2021-01-07 | End: 2021-05-04

## 2020-12-23 NOTE — TELEPHONE ENCOUNTER
Spoke with Mr. Slaughter regarding his AF and nonsustained VT. Discussed starting amiodarone for now. His AF is not at a burden that would cause a CMP in my opinion. Nor are his PVCs. Recommend amiodarone for now however to reduce his PVC burden and hopefully reduce NSVT. He is still having his GDMT adjusted. Discussed with him that once he is on a maximally tolerated regimen then we would repeat an ECHO after 90 days to see if his EF has recovered or not. If it hasn't then I would recommend ICD. He understood.     Discussed long-term risks of amiodarone. He understood. Will rx 400mg to take twice daily for 2 weeks then 200mg daily after. He has follow-up arranged with me already.

## 2020-12-24 ENCOUNTER — PATIENT MESSAGE (OUTPATIENT)
Dept: CARDIOLOGY | Facility: CLINIC | Age: 60
End: 2020-12-24

## 2020-12-29 DIAGNOSIS — I50.41 ACUTE COMBINED SYSTOLIC AND DIASTOLIC HEART FAILURE: ICD-10-CM

## 2020-12-29 RX ORDER — SACUBITRIL AND VALSARTAN 49; 51 MG/1; MG/1
1 TABLET, FILM COATED ORAL 2 TIMES DAILY
Qty: 60 TABLET | Refills: 11 | Status: SHIPPED | OUTPATIENT
Start: 2020-12-29 | End: 2020-12-29

## 2020-12-29 RX ORDER — SACUBITRIL AND VALSARTAN 97; 103 MG/1; MG/1
1 TABLET, FILM COATED ORAL 2 TIMES DAILY
Qty: 60 TABLET | Refills: 11 | Status: SHIPPED | OUTPATIENT
Start: 2020-12-29 | End: 2021-01-14 | Stop reason: SDUPTHER

## 2020-12-31 ENCOUNTER — TELEPHONE (OUTPATIENT)
Dept: CARDIOLOGY | Facility: CLINIC | Age: 60
End: 2020-12-31

## 2021-01-12 ENCOUNTER — TELEPHONE (OUTPATIENT)
Dept: CARDIOLOGY | Facility: CLINIC | Age: 61
End: 2021-01-12

## 2021-01-14 ENCOUNTER — HOSPITAL ENCOUNTER (OUTPATIENT)
Dept: CARDIOLOGY | Facility: HOSPITAL | Age: 61
Discharge: HOME | End: 2021-01-14
Attending: INTERNAL MEDICINE
Payer: COMMERCIAL

## 2021-01-14 ENCOUNTER — TELEPHONE (OUTPATIENT)
Dept: INTERNAL MEDICINE | Facility: CLINIC | Age: 61
End: 2021-01-14
Payer: COMMERCIAL

## 2021-01-14 DIAGNOSIS — I50.41 ACUTE COMBINED SYSTOLIC AND DIASTOLIC HEART FAILURE: ICD-10-CM

## 2021-01-14 DIAGNOSIS — Z23 NEED FOR SHINGLES VACCINE: Primary | ICD-10-CM

## 2021-01-14 DIAGNOSIS — I50.42 CHRONIC COMBINED SYSTOLIC AND DIASTOLIC HEART FAILURE: Primary | ICD-10-CM

## 2021-01-14 LAB
CV PHARM DOSE: 0.4 MG
CV STRESS BASE HR: 69 BPM
DIASTOLIC BLOOD PRESSURE: 82 MMHG
END DIASTOLIC INDEX-HIGH: 170 ML/M2
END SYSTOLIC INDEX-HIGH: 70 ML/M2
NUC REST DIASTOLIC VOLUME INDEX: 209
NUC REST EJECTION FRACTION: 36
NUC REST SYSTOLIC VOLUME INDEX: 133
NUC STRESS DIASTOLIC VOLUME INDEX: 193
NUC STRESS EJECTION FRACTION: 37 %
NUC STRESS SYSTOLIC VOLUME INDEX: 121
OHS CV CPX 85 PERCENT MAX PREDICTED HEART RATE MALE: 134
OHS CV CPX MAX PREDICTED HEART RATE: 158
OHS CV CPX PATIENT IS FEMALE: 0
OHS CV CPX PATIENT IS MALE: 1
OHS CV CPX PEAK DIASTOLIC BLOOD PRESSURE: 82 MMHG
OHS CV CPX PEAK HEAR RATE: 73 BPM
OHS CV CPX PEAK RATE PRESSURE PRODUCT: NORMAL
OHS CV CPX PEAK SYSTOLIC BLOOD PRESSURE: 137 MMHG
OHS CV CPX PERCENT MAX PREDICTED HEART RATE ACHIEVED: 46
OHS CV CPX RATE PRESSURE PRODUCT PRESENTING: 9591
RETIRED EF AND QEF - SEE NOTES: 51 %
SYSTOLIC BLOOD PRESSURE: 139 MMHG

## 2021-01-14 PROCEDURE — 93018 CV STRESS TEST I&R ONLY: CPT | Mod: ,,, | Performed by: INTERNAL MEDICINE

## 2021-01-14 PROCEDURE — 93018 STRESS TEST WITH MYOCARDIAL PERFUSION (CUPID ONLY): ICD-10-PCS | Mod: ,,, | Performed by: INTERNAL MEDICINE

## 2021-01-14 PROCEDURE — A9502 TC99M TETROFOSMIN: HCPCS

## 2021-01-14 PROCEDURE — 93017 CV STRESS TEST TRACING ONLY: CPT

## 2021-01-14 PROCEDURE — 78452 STRESS TEST WITH MYOCARDIAL PERFUSION (CUPID ONLY): ICD-10-PCS | Mod: 26,,, | Performed by: INTERNAL MEDICINE

## 2021-01-14 PROCEDURE — 78452 HT MUSCLE IMAGE SPECT MULT: CPT | Mod: 26,,, | Performed by: INTERNAL MEDICINE

## 2021-01-14 PROCEDURE — 93016 CV STRESS TEST SUPVJ ONLY: CPT | Mod: ,,, | Performed by: INTERNAL MEDICINE

## 2021-01-14 PROCEDURE — 93016 STRESS TEST WITH MYOCARDIAL PERFUSION (CUPID ONLY): ICD-10-PCS | Mod: ,,, | Performed by: INTERNAL MEDICINE

## 2021-01-14 PROCEDURE — 63600175 PHARM REV CODE 636 W HCPCS: Performed by: INTERNAL MEDICINE

## 2021-01-14 RX ORDER — SACUBITRIL AND VALSARTAN 97; 103 MG/1; MG/1
1 TABLET, FILM COATED ORAL 2 TIMES DAILY
Qty: 60 TABLET | Refills: 11 | Status: SHIPPED | OUTPATIENT
Start: 2021-01-14 | End: 2021-12-30

## 2021-01-14 RX ORDER — REGADENOSON 0.08 MG/ML
0.4 INJECTION, SOLUTION INTRAVENOUS ONCE
Status: COMPLETED | OUTPATIENT
Start: 2021-01-14 | End: 2021-01-14

## 2021-01-14 RX ADMIN — REGADENOSON 0.4 MG: 0.08 INJECTION, SOLUTION INTRAVENOUS at 09:01

## 2021-01-24 ENCOUNTER — PATIENT OUTREACH (OUTPATIENT)
Dept: ADMINISTRATIVE | Facility: OTHER | Age: 61
End: 2021-01-24

## 2021-01-26 ENCOUNTER — OFFICE VISIT (OUTPATIENT)
Dept: CARDIOLOGY | Facility: CLINIC | Age: 61
End: 2021-01-26
Payer: COMMERCIAL

## 2021-01-26 VITALS
HEIGHT: 71 IN | BODY MASS INDEX: 31.97 KG/M2 | SYSTOLIC BLOOD PRESSURE: 134 MMHG | WEIGHT: 228.38 LBS | DIASTOLIC BLOOD PRESSURE: 71 MMHG | HEART RATE: 72 BPM

## 2021-01-26 DIAGNOSIS — I48.0 PAROXYSMAL ATRIAL FIBRILLATION: ICD-10-CM

## 2021-01-26 DIAGNOSIS — E11.9 TYPE 2 DIABETES MELLITUS WITHOUT COMPLICATION, WITHOUT LONG-TERM CURRENT USE OF INSULIN: ICD-10-CM

## 2021-01-26 DIAGNOSIS — I10 ESSENTIAL HYPERTENSION: ICD-10-CM

## 2021-01-26 DIAGNOSIS — I50.42 CHRONIC COMBINED SYSTOLIC AND DIASTOLIC HEART FAILURE: ICD-10-CM

## 2021-01-26 DIAGNOSIS — I50.41 ACUTE COMBINED SYSTOLIC AND DIASTOLIC HEART FAILURE: Primary | ICD-10-CM

## 2021-01-26 PROCEDURE — 99999 PR PBB SHADOW E&M-EST. PATIENT-LVL III: CPT | Mod: PBBFAC,,, | Performed by: INTERNAL MEDICINE

## 2021-01-26 PROCEDURE — 3078F DIAST BP <80 MM HG: CPT | Mod: CPTII,S$GLB,, | Performed by: INTERNAL MEDICINE

## 2021-01-26 PROCEDURE — 3075F SYST BP GE 130 - 139MM HG: CPT | Mod: CPTII,S$GLB,, | Performed by: INTERNAL MEDICINE

## 2021-01-26 PROCEDURE — 3008F PR BODY MASS INDEX (BMI) DOCUMENTED: ICD-10-PCS | Mod: CPTII,S$GLB,, | Performed by: INTERNAL MEDICINE

## 2021-01-26 PROCEDURE — 3078F PR MOST RECENT DIASTOLIC BLOOD PRESSURE < 80 MM HG: ICD-10-PCS | Mod: CPTII,S$GLB,, | Performed by: INTERNAL MEDICINE

## 2021-01-26 PROCEDURE — 99999 PR PBB SHADOW E&M-EST. PATIENT-LVL III: ICD-10-PCS | Mod: PBBFAC,,, | Performed by: INTERNAL MEDICINE

## 2021-01-26 PROCEDURE — 99214 PR OFFICE/OUTPT VISIT, EST, LEVL IV, 30-39 MIN: ICD-10-PCS | Mod: S$GLB,,, | Performed by: INTERNAL MEDICINE

## 2021-01-26 PROCEDURE — 3075F PR MOST RECENT SYSTOLIC BLOOD PRESS GE 130-139MM HG: ICD-10-PCS | Mod: CPTII,S$GLB,, | Performed by: INTERNAL MEDICINE

## 2021-01-26 PROCEDURE — 3051F HG A1C>EQUAL 7.0%<8.0%: CPT | Mod: CPTII,S$GLB,, | Performed by: INTERNAL MEDICINE

## 2021-01-26 PROCEDURE — 1126F PR PAIN SEVERITY QUANTIFIED, NO PAIN PRESENT: ICD-10-PCS | Mod: S$GLB,,, | Performed by: INTERNAL MEDICINE

## 2021-01-26 PROCEDURE — 3051F PR MOST RECENT HEMOGLOBIN A1C LEVEL 7.0 - < 8.0%: ICD-10-PCS | Mod: CPTII,S$GLB,, | Performed by: INTERNAL MEDICINE

## 2021-01-26 PROCEDURE — 3008F BODY MASS INDEX DOCD: CPT | Mod: CPTII,S$GLB,, | Performed by: INTERNAL MEDICINE

## 2021-01-26 PROCEDURE — 1126F AMNT PAIN NOTED NONE PRSNT: CPT | Mod: S$GLB,,, | Performed by: INTERNAL MEDICINE

## 2021-01-26 PROCEDURE — 99214 OFFICE O/P EST MOD 30 MIN: CPT | Mod: S$GLB,,, | Performed by: INTERNAL MEDICINE

## 2021-02-17 ENCOUNTER — TELEPHONE (OUTPATIENT)
Dept: ELECTROPHYSIOLOGY | Facility: CLINIC | Age: 61
End: 2021-02-17

## 2021-02-17 DIAGNOSIS — I49.8 OTHER SPECIFIED CARDIAC ARRHYTHMIAS: Primary | ICD-10-CM

## 2021-02-19 ENCOUNTER — RESEARCH ENCOUNTER (OUTPATIENT)
Dept: RESEARCH | Facility: HOSPITAL | Age: 61
End: 2021-02-19

## 2021-02-24 ENCOUNTER — RESEARCH ENCOUNTER (OUTPATIENT)
Dept: RESEARCH | Facility: HOSPITAL | Age: 61
End: 2021-02-24

## 2021-02-24 ENCOUNTER — PATIENT OUTREACH (OUTPATIENT)
Dept: ADMINISTRATIVE | Facility: OTHER | Age: 61
End: 2021-02-24

## 2021-02-25 ENCOUNTER — OFFICE VISIT (OUTPATIENT)
Dept: ELECTROPHYSIOLOGY | Facility: CLINIC | Age: 61
End: 2021-02-25
Payer: COMMERCIAL

## 2021-02-25 ENCOUNTER — HOSPITAL ENCOUNTER (OUTPATIENT)
Dept: CARDIOLOGY | Facility: CLINIC | Age: 61
Discharge: HOME | End: 2021-02-25
Payer: COMMERCIAL

## 2021-02-25 ENCOUNTER — RESEARCH ENCOUNTER (OUTPATIENT)
Dept: RESEARCH | Facility: HOSPITAL | Age: 61
End: 2021-02-25

## 2021-02-25 VITALS
HEART RATE: 76 BPM | BODY MASS INDEX: 32.44 KG/M2 | DIASTOLIC BLOOD PRESSURE: 90 MMHG | HEIGHT: 71 IN | SYSTOLIC BLOOD PRESSURE: 142 MMHG | WEIGHT: 231.69 LBS

## 2021-02-25 DIAGNOSIS — E11.9 TYPE 2 DIABETES MELLITUS WITHOUT COMPLICATION, WITHOUT LONG-TERM CURRENT USE OF INSULIN: ICD-10-CM

## 2021-02-25 DIAGNOSIS — I10 ESSENTIAL HYPERTENSION: ICD-10-CM

## 2021-02-25 DIAGNOSIS — I50.42 CHRONIC COMBINED SYSTOLIC AND DIASTOLIC HEART FAILURE: ICD-10-CM

## 2021-02-25 DIAGNOSIS — I48.0 PAROXYSMAL ATRIAL FIBRILLATION: Primary | ICD-10-CM

## 2021-02-25 DIAGNOSIS — I47.29 NONSUSTAINED VENTRICULAR TACHYCARDIA: ICD-10-CM

## 2021-02-25 DIAGNOSIS — I49.8 OTHER SPECIFIED CARDIAC ARRHYTHMIAS: ICD-10-CM

## 2021-02-25 DIAGNOSIS — I49.3 FREQUENT PVCS: ICD-10-CM

## 2021-02-25 PROCEDURE — 93010 ELECTROCARDIOGRAM REPORT: CPT | Mod: S$GLB,,, | Performed by: INTERNAL MEDICINE

## 2021-02-25 PROCEDURE — 3080F DIAST BP >= 90 MM HG: CPT | Mod: CPTII,S$GLB,, | Performed by: INTERNAL MEDICINE

## 2021-02-25 PROCEDURE — 3080F PR MOST RECENT DIASTOLIC BLOOD PRESSURE >= 90 MM HG: ICD-10-PCS | Mod: CPTII,S$GLB,, | Performed by: INTERNAL MEDICINE

## 2021-02-25 PROCEDURE — 3051F HG A1C>EQUAL 7.0%<8.0%: CPT | Mod: CPTII,S$GLB,, | Performed by: INTERNAL MEDICINE

## 2021-02-25 PROCEDURE — 3077F PR MOST RECENT SYSTOLIC BLOOD PRESSURE >= 140 MM HG: ICD-10-PCS | Mod: CPTII,S$GLB,, | Performed by: INTERNAL MEDICINE

## 2021-02-25 PROCEDURE — 99999 PR PBB SHADOW E&M-EST. PATIENT-LVL III: ICD-10-PCS | Mod: PBBFAC,,, | Performed by: INTERNAL MEDICINE

## 2021-02-25 PROCEDURE — 3051F PR MOST RECENT HEMOGLOBIN A1C LEVEL 7.0 - < 8.0%: ICD-10-PCS | Mod: CPTII,S$GLB,, | Performed by: INTERNAL MEDICINE

## 2021-02-25 PROCEDURE — 99214 PR OFFICE/OUTPT VISIT, EST, LEVL IV, 30-39 MIN: ICD-10-PCS | Mod: S$GLB,,, | Performed by: INTERNAL MEDICINE

## 2021-02-25 PROCEDURE — 99214 OFFICE O/P EST MOD 30 MIN: CPT | Mod: S$GLB,,, | Performed by: INTERNAL MEDICINE

## 2021-02-25 PROCEDURE — 93005 RHYTHM STRIP: ICD-10-PCS | Mod: S$GLB,,, | Performed by: INTERNAL MEDICINE

## 2021-02-25 PROCEDURE — 1126F AMNT PAIN NOTED NONE PRSNT: CPT | Mod: S$GLB,,, | Performed by: INTERNAL MEDICINE

## 2021-02-25 PROCEDURE — 3008F BODY MASS INDEX DOCD: CPT | Mod: CPTII,S$GLB,, | Performed by: INTERNAL MEDICINE

## 2021-02-25 PROCEDURE — 93005 ELECTROCARDIOGRAM TRACING: CPT | Mod: S$GLB,,, | Performed by: INTERNAL MEDICINE

## 2021-02-25 PROCEDURE — 3008F PR BODY MASS INDEX (BMI) DOCUMENTED: ICD-10-PCS | Mod: CPTII,S$GLB,, | Performed by: INTERNAL MEDICINE

## 2021-02-25 PROCEDURE — 99999 PR PBB SHADOW E&M-EST. PATIENT-LVL III: CPT | Mod: PBBFAC,,, | Performed by: INTERNAL MEDICINE

## 2021-02-25 PROCEDURE — 93010 RHYTHM STRIP: ICD-10-PCS | Mod: S$GLB,,, | Performed by: INTERNAL MEDICINE

## 2021-02-25 PROCEDURE — 3077F SYST BP >= 140 MM HG: CPT | Mod: CPTII,S$GLB,, | Performed by: INTERNAL MEDICINE

## 2021-02-25 PROCEDURE — 1126F PR PAIN SEVERITY QUANTIFIED, NO PAIN PRESENT: ICD-10-PCS | Mod: S$GLB,,, | Performed by: INTERNAL MEDICINE

## 2021-03-01 ENCOUNTER — HOSPITAL ENCOUNTER (OUTPATIENT)
Dept: CARDIOLOGY | Facility: HOSPITAL | Age: 61
Discharge: HOME | End: 2021-03-01
Attending: INTERNAL MEDICINE
Payer: COMMERCIAL

## 2021-03-01 VITALS
HEART RATE: 75 BPM | BODY MASS INDEX: 31.92 KG/M2 | WEIGHT: 228 LBS | SYSTOLIC BLOOD PRESSURE: 158 MMHG | HEIGHT: 71 IN | DIASTOLIC BLOOD PRESSURE: 88 MMHG

## 2021-03-01 DIAGNOSIS — I50.42 CHRONIC COMBINED SYSTOLIC AND DIASTOLIC HEART FAILURE: ICD-10-CM

## 2021-03-01 LAB
ASCENDING AORTA: 3.02 CM
AV INDEX (PROSTH): 0.61
AV MEAN GRADIENT: 2 MMHG
AV PEAK GRADIENT: 3 MMHG
AV VALVE AREA: 2.57 CM2
AV VELOCITY RATIO: 0.68
BSA FOR ECHO PROCEDURE: 2.28 M2
CV ECHO LV RWT: 0.35 CM
DOP CALC AO PEAK VEL: 0.9 M/S
DOP CALC AO VTI: 19.38 CM
DOP CALC LVOT AREA: 4.2 CM2
DOP CALC LVOT DIAMETER: 2.32 CM
DOP CALC LVOT PEAK VEL: 0.61 M/S
DOP CALC LVOT STROKE VOLUME: 49.81 CM3
DOP CALCLVOT PEAK VEL VTI: 11.79 CM
E WAVE DECELERATION TIME: 193.99 MSEC
E/A RATIO: 1.25
E/E' RATIO: 10.77 M/S
ECHO LV POSTERIOR WALL: 0.86 CM (ref 0.6–1.1)
FRACTIONAL SHORTENING: 12 % (ref 28–44)
INTERVENTRICULAR SEPTUM: 0.97 CM (ref 0.6–1.1)
IVRT: 74.22 MSEC
LA MAJOR: 6.11 CM
LA MINOR: 6.14 CM
LA WIDTH: 5.05 CM
LEFT ATRIUM SIZE: 4.19 CM
LEFT ATRIUM VOLUME INDEX MOD: 37.5 ML/M2
LEFT ATRIUM VOLUME INDEX: 49.4 ML/M2
LEFT ATRIUM VOLUME MOD: 83.71 CM3
LEFT ATRIUM VOLUME: 110.16 CM3
LEFT INTERNAL DIMENSION IN SYSTOLE: 4.32 CM (ref 2.1–4)
LEFT VENTRICLE DIASTOLIC VOLUME INDEX: 51.04 ML/M2
LEFT VENTRICLE DIASTOLIC VOLUME: 113.81 ML
LEFT VENTRICLE MASS INDEX: 71 G/M2
LEFT VENTRICLE SYSTOLIC VOLUME INDEX: 37.7 ML/M2
LEFT VENTRICLE SYSTOLIC VOLUME: 84.12 ML
LEFT VENTRICULAR INTERNAL DIMENSION IN DIASTOLE: 4.92 CM (ref 3.5–6)
LEFT VENTRICULAR MASS: 157.39 G
LV LATERAL E/E' RATIO: 8.75 M/S
LV SEPTAL E/E' RATIO: 14 M/S
MV A" WAVE DURATION": 9.13 MSEC
MV PEAK A VEL: 0.56 M/S
MV PEAK E VEL: 0.7 M/S
MV STENOSIS PRESSURE HALF TIME: 71.48 MS
MV VALVE AREA P 1/2 METHOD: 3.08 CM2
PISA MRMAX VEL: 0.05 M/S
PISA TR MAX VEL: 2.62 M/S
PULM VEIN S/D RATIO: 0.47
PV PEAK D VEL: 0.88 M/S
PV PEAK S VEL: 0.41 M/S
RA MAJOR: 4.78 CM
RA WIDTH: 4.05 CM
RETIRED EF AND QEF - SEE NOTES: 35 %
RIGHT VENTRICULAR END-DIASTOLIC DIMENSION: 3.15 CM
RV TISSUE DOPPLER FREE WALL SYSTOLIC VELOCITY 1 (APICAL 4 CHAMBER VIEW): 9.53 CM/S
SINUS: 3.7 CM
STJ: 3 CM
TDI LATERAL: 0.08 M/S
TDI SEPTAL: 0.05 M/S
TDI: 0.07 M/S
TR MAX PG: 27 MMHG
TRICUSPID ANNULAR PLANE SYSTOLIC EXCURSION: 2.14 CM

## 2021-03-01 PROCEDURE — 93306 TTE W/DOPPLER COMPLETE: CPT | Mod: 26,,, | Performed by: INTERNAL MEDICINE

## 2021-03-01 PROCEDURE — 93306 TTE W/DOPPLER COMPLETE: CPT

## 2021-03-01 PROCEDURE — 93306 ECHO (CUPID ONLY): ICD-10-PCS | Mod: 26,,, | Performed by: INTERNAL MEDICINE

## 2021-03-05 RX ORDER — ATORVASTATIN CALCIUM 10 MG/1
TABLET, FILM COATED ORAL
Qty: 90 TABLET | Refills: 3 | Status: SHIPPED | OUTPATIENT
Start: 2021-03-05 | End: 2021-07-23 | Stop reason: SDUPTHER

## 2021-03-05 RX ORDER — METFORMIN HYDROCHLORIDE 1000 MG/1
TABLET ORAL
Qty: 180 TABLET | Refills: 1 | Status: SHIPPED | OUTPATIENT
Start: 2021-03-05 | End: 2021-10-13

## 2021-03-19 ENCOUNTER — RESEARCH ENCOUNTER (OUTPATIENT)
Dept: RESEARCH | Facility: HOSPITAL | Age: 61
End: 2021-03-19

## 2021-03-31 DIAGNOSIS — E11.9 TYPE 2 DIABETES MELLITUS WITHOUT COMPLICATION: ICD-10-CM

## 2021-04-05 ENCOUNTER — PATIENT MESSAGE (OUTPATIENT)
Dept: ADMINISTRATIVE | Facility: HOSPITAL | Age: 61
End: 2021-04-05

## 2021-04-14 ENCOUNTER — TELEPHONE (OUTPATIENT)
Dept: CARDIOLOGY | Facility: CLINIC | Age: 61
End: 2021-04-14

## 2021-04-15 ENCOUNTER — TELEPHONE (OUTPATIENT)
Dept: CARDIOLOGY | Facility: CLINIC | Age: 61
End: 2021-04-15

## 2021-04-16 ENCOUNTER — PATIENT MESSAGE (OUTPATIENT)
Dept: CARDIOLOGY | Facility: CLINIC | Age: 61
End: 2021-04-16

## 2021-04-21 ENCOUNTER — TELEPHONE (OUTPATIENT)
Dept: INTERNAL MEDICINE | Facility: CLINIC | Age: 61
End: 2021-04-21

## 2021-04-21 ENCOUNTER — OFFICE VISIT (OUTPATIENT)
Dept: INTERNAL MEDICINE | Facility: CLINIC | Age: 61
End: 2021-04-21
Payer: COMMERCIAL

## 2021-04-21 VITALS
SYSTOLIC BLOOD PRESSURE: 132 MMHG | HEIGHT: 71 IN | BODY MASS INDEX: 32.81 KG/M2 | DIASTOLIC BLOOD PRESSURE: 74 MMHG | WEIGHT: 234.38 LBS | HEART RATE: 74 BPM | OXYGEN SATURATION: 95 %

## 2021-04-21 DIAGNOSIS — I10 ESSENTIAL HYPERTENSION: ICD-10-CM

## 2021-04-21 DIAGNOSIS — Z79.01 ANTICOAGULATED: ICD-10-CM

## 2021-04-21 DIAGNOSIS — N52.9 ERECTILE DYSFUNCTION, UNSPECIFIED ERECTILE DYSFUNCTION TYPE: ICD-10-CM

## 2021-04-21 DIAGNOSIS — I50.42 CHRONIC COMBINED SYSTOLIC AND DIASTOLIC HEART FAILURE: ICD-10-CM

## 2021-04-21 DIAGNOSIS — Z12.5 PROSTATE CANCER SCREENING: ICD-10-CM

## 2021-04-21 DIAGNOSIS — E11.9 TYPE 2 DIABETES MELLITUS WITHOUT COMPLICATION, WITHOUT LONG-TERM CURRENT USE OF INSULIN: Primary | ICD-10-CM

## 2021-04-21 DIAGNOSIS — I48.0 PAROXYSMAL ATRIAL FIBRILLATION: ICD-10-CM

## 2021-04-21 DIAGNOSIS — E66.09 CLASS 1 OBESITY DUE TO EXCESS CALORIES WITH SERIOUS COMORBIDITY AND BODY MASS INDEX (BMI) OF 32.0 TO 32.9 IN ADULT: ICD-10-CM

## 2021-04-21 PROBLEM — E66.811 CLASS 1 OBESITY DUE TO EXCESS CALORIES WITH SERIOUS COMORBIDITY AND BODY MASS INDEX (BMI) OF 32.0 TO 32.9 IN ADULT: Status: ACTIVE | Noted: 2020-05-18

## 2021-04-21 PROCEDURE — 1126F AMNT PAIN NOTED NONE PRSNT: CPT | Mod: S$GLB,,, | Performed by: FAMILY MEDICINE

## 2021-04-21 PROCEDURE — 3051F HG A1C>EQUAL 7.0%<8.0%: CPT | Mod: CPTII,S$GLB,, | Performed by: FAMILY MEDICINE

## 2021-04-21 PROCEDURE — 99999 PR PBB SHADOW E&M-EST. PATIENT-LVL V: CPT | Mod: PBBFAC,,, | Performed by: FAMILY MEDICINE

## 2021-04-21 PROCEDURE — 99999 PR PBB SHADOW E&M-EST. PATIENT-LVL V: ICD-10-PCS | Mod: PBBFAC,,, | Performed by: FAMILY MEDICINE

## 2021-04-21 PROCEDURE — 3078F DIAST BP <80 MM HG: CPT | Mod: CPTII,S$GLB,, | Performed by: FAMILY MEDICINE

## 2021-04-21 PROCEDURE — 1126F PR PAIN SEVERITY QUANTIFIED, NO PAIN PRESENT: ICD-10-PCS | Mod: S$GLB,,, | Performed by: FAMILY MEDICINE

## 2021-04-21 PROCEDURE — 99214 PR OFFICE/OUTPT VISIT, EST, LEVL IV, 30-39 MIN: ICD-10-PCS | Mod: S$GLB,,, | Performed by: FAMILY MEDICINE

## 2021-04-21 PROCEDURE — 3008F BODY MASS INDEX DOCD: CPT | Mod: CPTII,S$GLB,, | Performed by: FAMILY MEDICINE

## 2021-04-21 PROCEDURE — 3008F PR BODY MASS INDEX (BMI) DOCUMENTED: ICD-10-PCS | Mod: CPTII,S$GLB,, | Performed by: FAMILY MEDICINE

## 2021-04-21 PROCEDURE — 3075F SYST BP GE 130 - 139MM HG: CPT | Mod: CPTII,S$GLB,, | Performed by: FAMILY MEDICINE

## 2021-04-21 PROCEDURE — 3075F PR MOST RECENT SYSTOLIC BLOOD PRESS GE 130-139MM HG: ICD-10-PCS | Mod: CPTII,S$GLB,, | Performed by: FAMILY MEDICINE

## 2021-04-21 PROCEDURE — 3078F PR MOST RECENT DIASTOLIC BLOOD PRESSURE < 80 MM HG: ICD-10-PCS | Mod: CPTII,S$GLB,, | Performed by: FAMILY MEDICINE

## 2021-04-21 PROCEDURE — 99214 OFFICE O/P EST MOD 30 MIN: CPT | Mod: S$GLB,,, | Performed by: FAMILY MEDICINE

## 2021-04-21 PROCEDURE — 3051F PR MOST RECENT HEMOGLOBIN A1C LEVEL 7.0 - < 8.0%: ICD-10-PCS | Mod: CPTII,S$GLB,, | Performed by: FAMILY MEDICINE

## 2021-04-21 RX ORDER — TADALAFIL 20 MG/1
TABLET ORAL
Qty: 10 TABLET | Refills: 11 | Status: SHIPPED | OUTPATIENT
Start: 2021-04-21 | End: 2022-03-23

## 2021-04-22 ENCOUNTER — PATIENT OUTREACH (OUTPATIENT)
Dept: ADMINISTRATIVE | Facility: OTHER | Age: 61
End: 2021-04-22

## 2021-04-23 ENCOUNTER — LAB VISIT (OUTPATIENT)
Dept: LAB | Facility: HOSPITAL | Age: 61
End: 2021-04-23
Attending: FAMILY MEDICINE
Payer: COMMERCIAL

## 2021-04-23 ENCOUNTER — OFFICE VISIT (OUTPATIENT)
Dept: CARDIOLOGY | Facility: CLINIC | Age: 61
End: 2021-04-23
Payer: COMMERCIAL

## 2021-04-23 VITALS
SYSTOLIC BLOOD PRESSURE: 144 MMHG | DIASTOLIC BLOOD PRESSURE: 86 MMHG | WEIGHT: 235 LBS | HEIGHT: 71 IN | BODY MASS INDEX: 32.9 KG/M2 | HEART RATE: 72 BPM

## 2021-04-23 DIAGNOSIS — I50.42 CHRONIC COMBINED SYSTOLIC AND DIASTOLIC HEART FAILURE: Primary | ICD-10-CM

## 2021-04-23 DIAGNOSIS — E11.9 TYPE 2 DIABETES MELLITUS WITHOUT COMPLICATION, WITHOUT LONG-TERM CURRENT USE OF INSULIN: ICD-10-CM

## 2021-04-23 DIAGNOSIS — I47.29 NONSUSTAINED VENTRICULAR TACHYCARDIA: ICD-10-CM

## 2021-04-23 DIAGNOSIS — I49.3 FREQUENT PVCS: ICD-10-CM

## 2021-04-23 DIAGNOSIS — I10 ESSENTIAL HYPERTENSION: ICD-10-CM

## 2021-04-23 DIAGNOSIS — Z12.5 PROSTATE CANCER SCREENING: ICD-10-CM

## 2021-04-23 DIAGNOSIS — I48.0 PAROXYSMAL ATRIAL FIBRILLATION: ICD-10-CM

## 2021-04-23 PROBLEM — E66.811 CLASS 1 OBESITY DUE TO EXCESS CALORIES WITH SERIOUS COMORBIDITY AND BODY MASS INDEX (BMI) OF 32.0 TO 32.9 IN ADULT: Status: RESOLVED | Noted: 2020-05-18 | Resolved: 2021-04-23

## 2021-04-23 PROBLEM — E66.09 CLASS 1 OBESITY DUE TO EXCESS CALORIES WITH SERIOUS COMORBIDITY AND BODY MASS INDEX (BMI) OF 32.0 TO 32.9 IN ADULT: Status: RESOLVED | Noted: 2020-05-18 | Resolved: 2021-04-23

## 2021-04-23 LAB
ALBUMIN SERPL BCP-MCNC: 3.8 G/DL (ref 3.5–5.2)
ALP SERPL-CCNC: 53 U/L (ref 55–135)
ALT SERPL W/O P-5'-P-CCNC: 14 U/L (ref 10–44)
ANION GAP SERPL CALC-SCNC: 10 MMOL/L (ref 8–16)
AST SERPL-CCNC: 11 U/L (ref 10–40)
BILIRUB SERPL-MCNC: 0.7 MG/DL (ref 0.1–1)
BUN SERPL-MCNC: 18 MG/DL (ref 8–23)
CALCIUM SERPL-MCNC: 9.1 MG/DL (ref 8.7–10.5)
CHLORIDE SERPL-SCNC: 104 MMOL/L (ref 95–110)
CO2 SERPL-SCNC: 26 MMOL/L (ref 23–29)
COMPLEXED PSA SERPL-MCNC: 0.52 NG/ML (ref 0–4)
CREAT SERPL-MCNC: 1.2 MG/DL (ref 0.5–1.4)
EST. GFR  (AFRICAN AMERICAN): >60 ML/MIN/1.73 M^2
EST. GFR  (NON AFRICAN AMERICAN): >60 ML/MIN/1.73 M^2
ESTIMATED AVG GLUCOSE: 214 MG/DL (ref 68–131)
GLUCOSE SERPL-MCNC: 206 MG/DL (ref 70–110)
HBA1C MFR BLD: 9.1 % (ref 4–5.6)
POTASSIUM SERPL-SCNC: 3.9 MMOL/L (ref 3.5–5.1)
PROT SERPL-MCNC: 7.1 G/DL (ref 6–8.4)
SODIUM SERPL-SCNC: 140 MMOL/L (ref 136–145)
TSH SERPL DL<=0.005 MIU/L-ACNC: 1.61 UIU/ML (ref 0.4–4)

## 2021-04-23 PROCEDURE — 3051F HG A1C>EQUAL 7.0%<8.0%: CPT | Mod: CPTII,S$GLB,, | Performed by: INTERNAL MEDICINE

## 2021-04-23 PROCEDURE — 3008F BODY MASS INDEX DOCD: CPT | Mod: CPTII,S$GLB,, | Performed by: INTERNAL MEDICINE

## 2021-04-23 PROCEDURE — 84153 ASSAY OF PSA TOTAL: CPT | Performed by: FAMILY MEDICINE

## 2021-04-23 PROCEDURE — 99214 PR OFFICE/OUTPT VISIT, EST, LEVL IV, 30-39 MIN: ICD-10-PCS | Mod: S$GLB,,, | Performed by: INTERNAL MEDICINE

## 2021-04-23 PROCEDURE — 99214 OFFICE O/P EST MOD 30 MIN: CPT | Mod: S$GLB,,, | Performed by: INTERNAL MEDICINE

## 2021-04-23 PROCEDURE — 1126F AMNT PAIN NOTED NONE PRSNT: CPT | Mod: S$GLB,,, | Performed by: INTERNAL MEDICINE

## 2021-04-23 PROCEDURE — 84443 ASSAY THYROID STIM HORMONE: CPT | Performed by: FAMILY MEDICINE

## 2021-04-23 PROCEDURE — 36415 COLL VENOUS BLD VENIPUNCTURE: CPT | Performed by: FAMILY MEDICINE

## 2021-04-23 PROCEDURE — 83036 HEMOGLOBIN GLYCOSYLATED A1C: CPT | Performed by: FAMILY MEDICINE

## 2021-04-23 PROCEDURE — 99999 PR PBB SHADOW E&M-EST. PATIENT-LVL III: CPT | Mod: PBBFAC,,, | Performed by: INTERNAL MEDICINE

## 2021-04-23 PROCEDURE — 80053 COMPREHEN METABOLIC PANEL: CPT | Performed by: FAMILY MEDICINE

## 2021-04-23 PROCEDURE — 3008F PR BODY MASS INDEX (BMI) DOCUMENTED: ICD-10-PCS | Mod: CPTII,S$GLB,, | Performed by: INTERNAL MEDICINE

## 2021-04-23 PROCEDURE — 3051F PR MOST RECENT HEMOGLOBIN A1C LEVEL 7.0 - < 8.0%: ICD-10-PCS | Mod: CPTII,S$GLB,, | Performed by: INTERNAL MEDICINE

## 2021-04-23 PROCEDURE — 99999 PR PBB SHADOW E&M-EST. PATIENT-LVL III: ICD-10-PCS | Mod: PBBFAC,,, | Performed by: INTERNAL MEDICINE

## 2021-04-23 PROCEDURE — 1126F PR PAIN SEVERITY QUANTIFIED, NO PAIN PRESENT: ICD-10-PCS | Mod: S$GLB,,, | Performed by: INTERNAL MEDICINE

## 2021-04-23 RX ORDER — DAPAGLIFLOZIN 10 MG/1
10 TABLET, FILM COATED ORAL DAILY
Qty: 30 TABLET | Refills: 11 | Status: SHIPPED | OUTPATIENT
Start: 2021-04-23 | End: 2021-04-23 | Stop reason: SDUPTHER

## 2021-04-23 RX ORDER — METOPROLOL SUCCINATE 50 MG/1
50 TABLET, EXTENDED RELEASE ORAL DAILY
Qty: 90 TABLET | Refills: 3
Start: 2021-04-23 | End: 2021-04-23 | Stop reason: SDUPTHER

## 2021-04-23 RX ORDER — DAPAGLIFLOZIN 10 MG/1
10 TABLET, FILM COATED ORAL DAILY
Qty: 30 TABLET | Refills: 11 | Status: SHIPPED | OUTPATIENT
Start: 2021-04-23 | End: 2021-05-03 | Stop reason: SDUPTHER

## 2021-04-23 RX ORDER — METOPROLOL SUCCINATE 50 MG/1
50 TABLET, EXTENDED RELEASE ORAL DAILY
Qty: 90 TABLET | Refills: 3 | Status: SHIPPED | OUTPATIENT
Start: 2021-04-23 | End: 2021-07-23 | Stop reason: SDUPTHER

## 2021-04-27 ENCOUNTER — TELEPHONE (OUTPATIENT)
Dept: INTERNAL MEDICINE | Facility: CLINIC | Age: 61
End: 2021-04-27

## 2021-04-27 ENCOUNTER — PATIENT MESSAGE (OUTPATIENT)
Dept: CARDIOLOGY | Facility: CLINIC | Age: 61
End: 2021-04-27

## 2021-04-27 DIAGNOSIS — E11.9 TYPE 2 DIABETES MELLITUS WITHOUT COMPLICATION, WITHOUT LONG-TERM CURRENT USE OF INSULIN: Primary | ICD-10-CM

## 2021-04-28 ENCOUNTER — TELEPHONE (OUTPATIENT)
Dept: INTERNAL MEDICINE | Facility: CLINIC | Age: 61
End: 2021-04-28

## 2021-04-28 DIAGNOSIS — E11.9 TYPE 2 DIABETES MELLITUS WITHOUT COMPLICATION, WITHOUT LONG-TERM CURRENT USE OF INSULIN: Primary | ICD-10-CM

## 2021-04-28 RX ORDER — SAXAGLIPTIN 2.5 MG/1
2.5 TABLET, FILM COATED ORAL DAILY
Qty: 90 TABLET | Refills: 3 | Status: SHIPPED | OUTPATIENT
Start: 2021-04-28 | End: 2021-09-29 | Stop reason: ALTCHOICE

## 2021-05-03 RX ORDER — DAPAGLIFLOZIN 10 MG/1
10 TABLET, FILM COATED ORAL DAILY
Qty: 30 TABLET | Refills: 11 | Status: SHIPPED | OUTPATIENT
Start: 2021-05-03 | End: 2021-07-19

## 2021-05-04 ENCOUNTER — PATIENT MESSAGE (OUTPATIENT)
Dept: CARDIOLOGY | Facility: CLINIC | Age: 61
End: 2021-05-04

## 2021-05-04 ENCOUNTER — PATIENT MESSAGE (OUTPATIENT)
Dept: INTERNAL MEDICINE | Facility: CLINIC | Age: 61
End: 2021-05-04

## 2021-05-04 ENCOUNTER — OFFICE VISIT (OUTPATIENT)
Dept: ELECTROPHYSIOLOGY | Facility: CLINIC | Age: 61
End: 2021-05-04
Payer: COMMERCIAL

## 2021-05-04 ENCOUNTER — HOSPITAL ENCOUNTER (OUTPATIENT)
Dept: CARDIOLOGY | Facility: CLINIC | Age: 61
Discharge: HOME | End: 2021-05-04
Payer: COMMERCIAL

## 2021-05-04 ENCOUNTER — PATIENT MESSAGE (OUTPATIENT)
Dept: DIABETES | Facility: CLINIC | Age: 61
End: 2021-05-04

## 2021-05-04 ENCOUNTER — TELEPHONE (OUTPATIENT)
Dept: CARDIOLOGY | Facility: CLINIC | Age: 61
End: 2021-05-04

## 2021-05-04 ENCOUNTER — PATIENT OUTREACH (OUTPATIENT)
Dept: DIABETES | Facility: CLINIC | Age: 61
End: 2021-05-04

## 2021-05-04 ENCOUNTER — PATIENT MESSAGE (OUTPATIENT)
Dept: ELECTROPHYSIOLOGY | Facility: CLINIC | Age: 61
End: 2021-05-04

## 2021-05-04 VITALS
WEIGHT: 235 LBS | SYSTOLIC BLOOD PRESSURE: 156 MMHG | HEART RATE: 72 BPM | DIASTOLIC BLOOD PRESSURE: 84 MMHG | HEIGHT: 71 IN | BODY MASS INDEX: 32.9 KG/M2

## 2021-05-04 DIAGNOSIS — E11.9 TYPE 2 DIABETES MELLITUS WITHOUT COMPLICATION, WITHOUT LONG-TERM CURRENT USE OF INSULIN: ICD-10-CM

## 2021-05-04 DIAGNOSIS — I49.3 FREQUENT PVCS: ICD-10-CM

## 2021-05-04 DIAGNOSIS — I50.42 CHRONIC COMBINED SYSTOLIC AND DIASTOLIC HEART FAILURE: Primary | ICD-10-CM

## 2021-05-04 DIAGNOSIS — I49.8 OTHER SPECIFIED CARDIAC ARRHYTHMIAS: ICD-10-CM

## 2021-05-04 DIAGNOSIS — I47.29 NONSUSTAINED VENTRICULAR TACHYCARDIA: ICD-10-CM

## 2021-05-04 DIAGNOSIS — I10 ESSENTIAL HYPERTENSION: ICD-10-CM

## 2021-05-04 DIAGNOSIS — I48.0 PAROXYSMAL ATRIAL FIBRILLATION: ICD-10-CM

## 2021-05-04 DIAGNOSIS — E11.9 TYPE 2 DIABETES MELLITUS WITHOUT COMPLICATION, WITHOUT LONG-TERM CURRENT USE OF INSULIN: Primary | ICD-10-CM

## 2021-05-04 PROCEDURE — 99214 OFFICE O/P EST MOD 30 MIN: CPT | Mod: S$GLB,,, | Performed by: INTERNAL MEDICINE

## 2021-05-04 PROCEDURE — 93005 RHYTHM STRIP: ICD-10-PCS | Mod: S$GLB,,, | Performed by: INTERNAL MEDICINE

## 2021-05-04 PROCEDURE — 93010 RHYTHM STRIP: ICD-10-PCS | Mod: S$GLB,,, | Performed by: INTERNAL MEDICINE

## 2021-05-04 PROCEDURE — 93005 ELECTROCARDIOGRAM TRACING: CPT | Mod: S$GLB,,, | Performed by: INTERNAL MEDICINE

## 2021-05-04 PROCEDURE — 99999 PR PBB SHADOW E&M-EST. PATIENT-LVL III: ICD-10-PCS | Mod: PBBFAC,,, | Performed by: INTERNAL MEDICINE

## 2021-05-04 PROCEDURE — 3008F PR BODY MASS INDEX (BMI) DOCUMENTED: ICD-10-PCS | Mod: CPTII,S$GLB,, | Performed by: INTERNAL MEDICINE

## 2021-05-04 PROCEDURE — 99214 PR OFFICE/OUTPT VISIT, EST, LEVL IV, 30-39 MIN: ICD-10-PCS | Mod: S$GLB,,, | Performed by: INTERNAL MEDICINE

## 2021-05-04 PROCEDURE — 3008F BODY MASS INDEX DOCD: CPT | Mod: CPTII,S$GLB,, | Performed by: INTERNAL MEDICINE

## 2021-05-04 PROCEDURE — 93010 ELECTROCARDIOGRAM REPORT: CPT | Mod: S$GLB,,, | Performed by: INTERNAL MEDICINE

## 2021-05-04 PROCEDURE — 1126F AMNT PAIN NOTED NONE PRSNT: CPT | Mod: S$GLB,,, | Performed by: INTERNAL MEDICINE

## 2021-05-04 PROCEDURE — 1126F PR PAIN SEVERITY QUANTIFIED, NO PAIN PRESENT: ICD-10-PCS | Mod: S$GLB,,, | Performed by: INTERNAL MEDICINE

## 2021-05-04 PROCEDURE — 99999 PR PBB SHADOW E&M-EST. PATIENT-LVL III: CPT | Mod: PBBFAC,,, | Performed by: INTERNAL MEDICINE

## 2021-05-04 RX ORDER — INSULIN PUMP SYRINGE, 3 ML
EACH MISCELLANEOUS
Qty: 1 EACH | Refills: 0 | Status: SHIPPED | OUTPATIENT
Start: 2021-05-04 | End: 2024-09-04

## 2021-05-04 RX ORDER — AMIODARONE HYDROCHLORIDE 200 MG/1
100 TABLET ORAL DAILY
Qty: 15 TABLET | Refills: 11 | Status: SHIPPED | OUTPATIENT
Start: 2021-05-04 | End: 2021-12-30

## 2021-05-12 ENCOUNTER — TELEPHONE (OUTPATIENT)
Dept: INTERNAL MEDICINE | Facility: CLINIC | Age: 61
End: 2021-05-12

## 2021-05-12 DIAGNOSIS — E11.9 TYPE 2 DIABETES MELLITUS WITHOUT COMPLICATION, WITHOUT LONG-TERM CURRENT USE OF INSULIN: Primary | ICD-10-CM

## 2021-05-27 ENCOUNTER — TELEPHONE (OUTPATIENT)
Dept: OPTOMETRY | Facility: CLINIC | Age: 61
End: 2021-05-27

## 2021-06-09 ENCOUNTER — TELEPHONE (OUTPATIENT)
Dept: ADMINISTRATIVE | Facility: HOSPITAL | Age: 61
End: 2021-06-09

## 2021-06-14 DIAGNOSIS — I10 ESSENTIAL HYPERTENSION: Primary | ICD-10-CM

## 2021-06-16 RX ORDER — AMLODIPINE BESYLATE 10 MG/1
10 TABLET ORAL DAILY
Qty: 90 TABLET | Refills: 0 | Status: SHIPPED | OUTPATIENT
Start: 2021-06-16 | End: 2021-08-24

## 2021-06-22 ENCOUNTER — PATIENT OUTREACH (OUTPATIENT)
Dept: ADMINISTRATIVE | Facility: OTHER | Age: 61
End: 2021-06-22

## 2021-06-23 ENCOUNTER — OFFICE VISIT (OUTPATIENT)
Dept: OPTOMETRY | Facility: CLINIC | Age: 61
End: 2021-06-23
Payer: COMMERCIAL

## 2021-06-23 DIAGNOSIS — H52.4 PRESBYOPIA OF BOTH EYES: ICD-10-CM

## 2021-06-23 DIAGNOSIS — E11.9 TYPE 2 DIABETES MELLITUS WITHOUT COMPLICATION, WITHOUT LONG-TERM CURRENT USE OF INSULIN: ICD-10-CM

## 2021-06-23 DIAGNOSIS — E11.9 DIABETES MELLITUS TYPE 2 WITHOUT RETINOPATHY: Primary | ICD-10-CM

## 2021-06-23 DIAGNOSIS — H25.13 NUCLEAR SCLEROTIC CATARACT, BILATERAL: ICD-10-CM

## 2021-06-23 PROCEDURE — 2023F PR DILATED RETINAL EXAM W/O EVID OF RETINOPATHY: ICD-10-PCS | Mod: S$GLB,,, | Performed by: OPTOMETRIST

## 2021-06-23 PROCEDURE — 92015 PR REFRACTION: ICD-10-PCS | Mod: S$GLB,,, | Performed by: OPTOMETRIST

## 2021-06-23 PROCEDURE — 92015 DETERMINE REFRACTIVE STATE: CPT | Mod: S$GLB,,, | Performed by: OPTOMETRIST

## 2021-06-23 PROCEDURE — 3046F HEMOGLOBIN A1C LEVEL >9.0%: CPT | Mod: CPTII,S$GLB,, | Performed by: OPTOMETRIST

## 2021-06-23 PROCEDURE — 92014 COMPRE OPH EXAM EST PT 1/>: CPT | Mod: S$GLB,,, | Performed by: OPTOMETRIST

## 2021-06-23 PROCEDURE — 3046F PR MOST RECENT HEMOGLOBIN A1C LEVEL > 9.0%: ICD-10-PCS | Mod: CPTII,S$GLB,, | Performed by: OPTOMETRIST

## 2021-06-23 PROCEDURE — 2023F DILAT RTA XM W/O RTNOPTHY: CPT | Mod: S$GLB,,, | Performed by: OPTOMETRIST

## 2021-06-23 PROCEDURE — 99999 PR PBB SHADOW E&M-EST. PATIENT-LVL III: ICD-10-PCS | Mod: PBBFAC,,, | Performed by: OPTOMETRIST

## 2021-06-23 PROCEDURE — 99999 PR PBB SHADOW E&M-EST. PATIENT-LVL III: CPT | Mod: PBBFAC,,, | Performed by: OPTOMETRIST

## 2021-06-23 PROCEDURE — 92014 PR EYE EXAM, EST PATIENT,COMPREHESV: ICD-10-PCS | Mod: S$GLB,,, | Performed by: OPTOMETRIST

## 2021-07-06 ENCOUNTER — PATIENT MESSAGE (OUTPATIENT)
Dept: DIABETES | Facility: CLINIC | Age: 61
End: 2021-07-06

## 2021-07-07 ENCOUNTER — TELEPHONE (OUTPATIENT)
Dept: CARDIOLOGY | Facility: HOSPITAL | Age: 61
End: 2021-07-07

## 2021-07-07 DIAGNOSIS — I50.42 CHRONIC COMBINED SYSTOLIC AND DIASTOLIC HEART FAILURE: Primary | ICD-10-CM

## 2021-07-19 ENCOUNTER — DOCUMENTATION ONLY (OUTPATIENT)
Dept: CARDIOLOGY | Facility: CLINIC | Age: 61
End: 2021-07-19

## 2021-07-20 ENCOUNTER — TELEPHONE (OUTPATIENT)
Dept: INTERNAL MEDICINE | Facility: CLINIC | Age: 61
End: 2021-07-20

## 2021-07-20 DIAGNOSIS — E11.9 TYPE 2 DIABETES MELLITUS WITHOUT COMPLICATION, WITHOUT LONG-TERM CURRENT USE OF INSULIN: ICD-10-CM

## 2021-07-20 DIAGNOSIS — Z12.5 PROSTATE CANCER SCREENING: ICD-10-CM

## 2021-07-20 DIAGNOSIS — I10 ESSENTIAL HYPERTENSION: Primary | ICD-10-CM

## 2021-07-23 ENCOUNTER — PATIENT MESSAGE (OUTPATIENT)
Dept: ADMINISTRATIVE | Facility: OTHER | Age: 61
End: 2021-07-23

## 2021-07-23 ENCOUNTER — OFFICE VISIT (OUTPATIENT)
Dept: CARDIOLOGY | Facility: CLINIC | Age: 61
End: 2021-07-23
Payer: COMMERCIAL

## 2021-07-23 VITALS
DIASTOLIC BLOOD PRESSURE: 92 MMHG | BODY MASS INDEX: 32.65 KG/M2 | HEART RATE: 89 BPM | OXYGEN SATURATION: 97 % | WEIGHT: 234.13 LBS | SYSTOLIC BLOOD PRESSURE: 146 MMHG

## 2021-07-23 DIAGNOSIS — I50.42 CHRONIC COMBINED SYSTOLIC AND DIASTOLIC HEART FAILURE: Primary | ICD-10-CM

## 2021-07-23 DIAGNOSIS — E11.9 TYPE 2 DIABETES MELLITUS WITHOUT COMPLICATION, WITHOUT LONG-TERM CURRENT USE OF INSULIN: ICD-10-CM

## 2021-07-23 DIAGNOSIS — I49.3 FREQUENT PVCS: ICD-10-CM

## 2021-07-23 DIAGNOSIS — I10 ESSENTIAL HYPERTENSION: ICD-10-CM

## 2021-07-23 DIAGNOSIS — I47.29 NONSUSTAINED VENTRICULAR TACHYCARDIA: ICD-10-CM

## 2021-07-23 DIAGNOSIS — I48.0 PAROXYSMAL ATRIAL FIBRILLATION: ICD-10-CM

## 2021-07-23 PROCEDURE — 3077F SYST BP >= 140 MM HG: CPT | Mod: CPTII,S$GLB,, | Performed by: INTERNAL MEDICINE

## 2021-07-23 PROCEDURE — 3046F HEMOGLOBIN A1C LEVEL >9.0%: CPT | Mod: CPTII,S$GLB,, | Performed by: INTERNAL MEDICINE

## 2021-07-23 PROCEDURE — 1126F PR PAIN SEVERITY QUANTIFIED, NO PAIN PRESENT: ICD-10-PCS | Mod: CPTII,S$GLB,, | Performed by: INTERNAL MEDICINE

## 2021-07-23 PROCEDURE — 3080F PR MOST RECENT DIASTOLIC BLOOD PRESSURE >= 90 MM HG: ICD-10-PCS | Mod: CPTII,S$GLB,, | Performed by: INTERNAL MEDICINE

## 2021-07-23 PROCEDURE — 3046F PR MOST RECENT HEMOGLOBIN A1C LEVEL > 9.0%: ICD-10-PCS | Mod: CPTII,S$GLB,, | Performed by: INTERNAL MEDICINE

## 2021-07-23 PROCEDURE — 3008F PR BODY MASS INDEX (BMI) DOCUMENTED: ICD-10-PCS | Mod: CPTII,S$GLB,, | Performed by: INTERNAL MEDICINE

## 2021-07-23 PROCEDURE — 3080F DIAST BP >= 90 MM HG: CPT | Mod: CPTII,S$GLB,, | Performed by: INTERNAL MEDICINE

## 2021-07-23 PROCEDURE — 1126F AMNT PAIN NOTED NONE PRSNT: CPT | Mod: CPTII,S$GLB,, | Performed by: INTERNAL MEDICINE

## 2021-07-23 PROCEDURE — 99214 PR OFFICE/OUTPT VISIT, EST, LEVL IV, 30-39 MIN: ICD-10-PCS | Mod: S$GLB,,, | Performed by: INTERNAL MEDICINE

## 2021-07-23 PROCEDURE — 99999 PR PBB SHADOW E&M-EST. PATIENT-LVL III: CPT | Mod: PBBFAC,,, | Performed by: INTERNAL MEDICINE

## 2021-07-23 PROCEDURE — 3008F BODY MASS INDEX DOCD: CPT | Mod: CPTII,S$GLB,, | Performed by: INTERNAL MEDICINE

## 2021-07-23 PROCEDURE — 99999 PR PBB SHADOW E&M-EST. PATIENT-LVL III: ICD-10-PCS | Mod: PBBFAC,,, | Performed by: INTERNAL MEDICINE

## 2021-07-23 PROCEDURE — 3077F PR MOST RECENT SYSTOLIC BLOOD PRESSURE >= 140 MM HG: ICD-10-PCS | Mod: CPTII,S$GLB,, | Performed by: INTERNAL MEDICINE

## 2021-07-23 PROCEDURE — 99214 OFFICE O/P EST MOD 30 MIN: CPT | Mod: S$GLB,,, | Performed by: INTERNAL MEDICINE

## 2021-07-23 RX ORDER — METOPROLOL SUCCINATE 50 MG/1
50 TABLET, EXTENDED RELEASE ORAL DAILY
Qty: 90 TABLET | Refills: 3 | Status: SHIPPED | OUTPATIENT
Start: 2021-07-23 | End: 2022-07-18

## 2021-07-23 RX ORDER — ATORVASTATIN CALCIUM 10 MG/1
10 TABLET, FILM COATED ORAL DAILY
Qty: 90 TABLET | Refills: 3 | Status: SHIPPED | OUTPATIENT
Start: 2021-07-23 | End: 2022-08-22

## 2021-07-28 ENCOUNTER — PATIENT OUTREACH (OUTPATIENT)
Dept: DIABETES | Facility: CLINIC | Age: 61
End: 2021-07-28

## 2021-07-28 ENCOUNTER — TELEPHONE (OUTPATIENT)
Dept: INTERNAL MEDICINE | Facility: CLINIC | Age: 61
End: 2021-07-28

## 2021-07-28 DIAGNOSIS — E11.9 TYPE 2 DIABETES MELLITUS WITHOUT COMPLICATION: ICD-10-CM

## 2021-07-29 ENCOUNTER — PATIENT OUTREACH (OUTPATIENT)
Dept: ADMINISTRATIVE | Facility: HOSPITAL | Age: 61
End: 2021-07-29

## 2021-08-02 ENCOUNTER — TELEPHONE (OUTPATIENT)
Dept: RESEARCH | Facility: HOSPITAL | Age: 61
End: 2021-08-02

## 2021-08-02 ENCOUNTER — TELEPHONE (OUTPATIENT)
Dept: INTERNAL MEDICINE | Facility: CLINIC | Age: 61
End: 2021-08-02

## 2021-08-03 ENCOUNTER — PATIENT MESSAGE (OUTPATIENT)
Dept: ADMINISTRATIVE | Facility: HOSPITAL | Age: 61
End: 2021-08-03

## 2021-08-04 ENCOUNTER — RESEARCH ENCOUNTER (OUTPATIENT)
Dept: RESEARCH | Facility: HOSPITAL | Age: 61
End: 2021-08-04

## 2021-08-09 ENCOUNTER — PATIENT OUTREACH (OUTPATIENT)
Dept: ADMINISTRATIVE | Facility: OTHER | Age: 61
End: 2021-08-09

## 2021-08-11 ENCOUNTER — PATIENT MESSAGE (OUTPATIENT)
Dept: DIABETES | Facility: CLINIC | Age: 61
End: 2021-08-11

## 2021-08-11 ENCOUNTER — TELEPHONE (OUTPATIENT)
Dept: DIABETES | Facility: CLINIC | Age: 61
End: 2021-08-11

## 2021-08-18 ENCOUNTER — TELEPHONE (OUTPATIENT)
Dept: DIABETES | Facility: CLINIC | Age: 61
End: 2021-08-18

## 2021-08-21 DIAGNOSIS — I10 ESSENTIAL HYPERTENSION: ICD-10-CM

## 2021-08-23 ENCOUNTER — CLINICAL SUPPORT (OUTPATIENT)
Dept: DIABETES | Facility: CLINIC | Age: 61
End: 2021-08-23
Payer: COMMERCIAL

## 2021-08-23 VITALS — WEIGHT: 230.19 LBS | BODY MASS INDEX: 32.1 KG/M2

## 2021-08-23 DIAGNOSIS — E11.9 TYPE 2 DIABETES MELLITUS WITHOUT COMPLICATION, WITHOUT LONG-TERM CURRENT USE OF INSULIN: Primary | ICD-10-CM

## 2021-08-23 PROCEDURE — G0108 DIAB MANAGE TRN  PER INDIV: HCPCS | Mod: S$GLB,,, | Performed by: DIETITIAN, REGISTERED

## 2021-08-23 PROCEDURE — G0108 PR DIAB MANAGE TRN  PER INDIV: ICD-10-PCS | Mod: S$GLB,,, | Performed by: DIETITIAN, REGISTERED

## 2021-08-24 RX ORDER — AMLODIPINE BESYLATE 10 MG/1
10 TABLET ORAL DAILY
Qty: 90 TABLET | Refills: 2 | Status: SHIPPED | OUTPATIENT
Start: 2021-08-24 | End: 2021-11-29

## 2021-09-21 ENCOUNTER — LAB VISIT (OUTPATIENT)
Dept: LAB | Facility: HOSPITAL | Age: 61
End: 2021-09-21
Attending: FAMILY MEDICINE
Payer: COMMERCIAL

## 2021-09-21 ENCOUNTER — TELEPHONE (OUTPATIENT)
Dept: INTERNAL MEDICINE | Facility: CLINIC | Age: 61
End: 2021-09-21

## 2021-09-21 ENCOUNTER — OFFICE VISIT (OUTPATIENT)
Dept: INTERNAL MEDICINE | Facility: CLINIC | Age: 61
End: 2021-09-21
Payer: COMMERCIAL

## 2021-09-21 VITALS
BODY MASS INDEX: 32.13 KG/M2 | WEIGHT: 229.5 LBS | HEART RATE: 70 BPM | DIASTOLIC BLOOD PRESSURE: 60 MMHG | HEIGHT: 71 IN | SYSTOLIC BLOOD PRESSURE: 120 MMHG | OXYGEN SATURATION: 96 %

## 2021-09-21 DIAGNOSIS — Z00.00 ANNUAL PHYSICAL EXAM: Primary | ICD-10-CM

## 2021-09-21 DIAGNOSIS — I48.0 PAROXYSMAL ATRIAL FIBRILLATION: ICD-10-CM

## 2021-09-21 DIAGNOSIS — N52.9 ERECTILE DYSFUNCTION, UNSPECIFIED ERECTILE DYSFUNCTION TYPE: ICD-10-CM

## 2021-09-21 DIAGNOSIS — E66.09 CLASS 1 OBESITY DUE TO EXCESS CALORIES WITH SERIOUS COMORBIDITY AND BODY MASS INDEX (BMI) OF 32.0 TO 32.9 IN ADULT: ICD-10-CM

## 2021-09-21 DIAGNOSIS — E11.9 TYPE 2 DIABETES MELLITUS WITHOUT COMPLICATION, WITHOUT LONG-TERM CURRENT USE OF INSULIN: ICD-10-CM

## 2021-09-21 DIAGNOSIS — I10 ESSENTIAL HYPERTENSION: ICD-10-CM

## 2021-09-21 DIAGNOSIS — Z12.5 PROSTATE CANCER SCREENING: ICD-10-CM

## 2021-09-21 DIAGNOSIS — Z23 NEED FOR SHINGLES VACCINE: Primary | ICD-10-CM

## 2021-09-21 DIAGNOSIS — Z79.01 ANTICOAGULATED: ICD-10-CM

## 2021-09-21 DIAGNOSIS — I50.42 CHRONIC COMBINED SYSTOLIC AND DIASTOLIC HEART FAILURE: ICD-10-CM

## 2021-09-21 LAB
ALBUMIN SERPL BCP-MCNC: 4.1 G/DL (ref 3.5–5.2)
ALP SERPL-CCNC: 42 U/L (ref 55–135)
ALT SERPL W/O P-5'-P-CCNC: 19 U/L (ref 10–44)
ANION GAP SERPL CALC-SCNC: 15 MMOL/L (ref 8–16)
AST SERPL-CCNC: 10 U/L (ref 10–40)
BASOPHILS # BLD AUTO: 0.05 K/UL (ref 0–0.2)
BASOPHILS NFR BLD: 0.7 % (ref 0–1.9)
BILIRUB SERPL-MCNC: 0.7 MG/DL (ref 0.1–1)
BUN SERPL-MCNC: 16 MG/DL (ref 8–23)
CALCIUM SERPL-MCNC: 9.9 MG/DL (ref 8.7–10.5)
CHLORIDE SERPL-SCNC: 105 MMOL/L (ref 95–110)
CHOLEST SERPL-MCNC: 116 MG/DL (ref 120–199)
CHOLEST/HDLC SERPL: 2.7 {RATIO} (ref 2–5)
CO2 SERPL-SCNC: 21 MMOL/L (ref 23–29)
COMPLEXED PSA SERPL-MCNC: 0.46 NG/ML (ref 0–4)
CREAT SERPL-MCNC: 1.1 MG/DL (ref 0.5–1.4)
DIFFERENTIAL METHOD: ABNORMAL
EOSINOPHIL # BLD AUTO: 0.1 K/UL (ref 0–0.5)
EOSINOPHIL NFR BLD: 1.5 % (ref 0–8)
ERYTHROCYTE [DISTWIDTH] IN BLOOD BY AUTOMATED COUNT: 14 % (ref 11.5–14.5)
EST. GFR  (AFRICAN AMERICAN): >60 ML/MIN/1.73 M^2
EST. GFR  (NON AFRICAN AMERICAN): >60 ML/MIN/1.73 M^2
ESTIMATED AVG GLUCOSE: 177 MG/DL (ref 68–131)
GLUCOSE SERPL-MCNC: 101 MG/DL (ref 70–110)
HBA1C MFR BLD: 7.8 % (ref 4–5.6)
HCT VFR BLD AUTO: 44.7 % (ref 40–54)
HDLC SERPL-MCNC: 43 MG/DL (ref 40–75)
HDLC SERPL: 37.1 % (ref 20–50)
HGB BLD-MCNC: 15 G/DL (ref 14–18)
IMM GRANULOCYTES # BLD AUTO: 0.02 K/UL (ref 0–0.04)
IMM GRANULOCYTES NFR BLD AUTO: 0.3 % (ref 0–0.5)
LDLC SERPL CALC-MCNC: 48.8 MG/DL (ref 63–159)
LYMPHOCYTES # BLD AUTO: 1.7 K/UL (ref 1–4.8)
LYMPHOCYTES NFR BLD: 22.2 % (ref 18–48)
MCH RBC QN AUTO: 26.8 PG (ref 27–31)
MCHC RBC AUTO-ENTMCNC: 33.6 G/DL (ref 32–36)
MCV RBC AUTO: 80 FL (ref 82–98)
MONOCYTES # BLD AUTO: 0.8 K/UL (ref 0.3–1)
MONOCYTES NFR BLD: 10.6 % (ref 4–15)
NEUTROPHILS # BLD AUTO: 4.8 K/UL (ref 1.8–7.7)
NEUTROPHILS NFR BLD: 64.7 % (ref 38–73)
NONHDLC SERPL-MCNC: 73 MG/DL
NRBC BLD-RTO: 0 /100 WBC
PLATELET # BLD AUTO: 278 K/UL (ref 150–450)
PMV BLD AUTO: 11.7 FL (ref 9.2–12.9)
POTASSIUM SERPL-SCNC: 4 MMOL/L (ref 3.5–5.1)
PROT SERPL-MCNC: 7.1 G/DL (ref 6–8.4)
RBC # BLD AUTO: 5.59 M/UL (ref 4.6–6.2)
SODIUM SERPL-SCNC: 141 MMOL/L (ref 136–145)
TRIGL SERPL-MCNC: 121 MG/DL (ref 30–150)
TSH SERPL DL<=0.005 MIU/L-ACNC: 1.64 UIU/ML (ref 0.4–4)
WBC # BLD AUTO: 7.44 K/UL (ref 3.9–12.7)

## 2021-09-21 PROCEDURE — 3046F HEMOGLOBIN A1C LEVEL >9.0%: CPT | Mod: CPTII,S$GLB,, | Performed by: FAMILY MEDICINE

## 2021-09-21 PROCEDURE — 80053 COMPREHEN METABOLIC PANEL: CPT | Performed by: FAMILY MEDICINE

## 2021-09-21 PROCEDURE — 80061 LIPID PANEL: CPT | Performed by: FAMILY MEDICINE

## 2021-09-21 PROCEDURE — 3078F DIAST BP <80 MM HG: CPT | Mod: CPTII,S$GLB,, | Performed by: FAMILY MEDICINE

## 2021-09-21 PROCEDURE — 3078F PR MOST RECENT DIASTOLIC BLOOD PRESSURE < 80 MM HG: ICD-10-PCS | Mod: CPTII,S$GLB,, | Performed by: FAMILY MEDICINE

## 2021-09-21 PROCEDURE — 3046F PR MOST RECENT HEMOGLOBIN A1C LEVEL > 9.0%: ICD-10-PCS | Mod: CPTII,S$GLB,, | Performed by: FAMILY MEDICINE

## 2021-09-21 PROCEDURE — 1159F PR MEDICATION LIST DOCUMENTED IN MEDICAL RECORD: ICD-10-PCS | Mod: CPTII,S$GLB,, | Performed by: FAMILY MEDICINE

## 2021-09-21 PROCEDURE — 36415 COLL VENOUS BLD VENIPUNCTURE: CPT | Performed by: FAMILY MEDICINE

## 2021-09-21 PROCEDURE — 3008F PR BODY MASS INDEX (BMI) DOCUMENTED: ICD-10-PCS | Mod: CPTII,S$GLB,, | Performed by: FAMILY MEDICINE

## 2021-09-21 PROCEDURE — 99396 PR PREVENTIVE VISIT,EST,40-64: ICD-10-PCS | Mod: S$GLB,,, | Performed by: FAMILY MEDICINE

## 2021-09-21 PROCEDURE — 85025 COMPLETE CBC W/AUTO DIFF WBC: CPT | Performed by: FAMILY MEDICINE

## 2021-09-21 PROCEDURE — 99396 PREV VISIT EST AGE 40-64: CPT | Mod: S$GLB,,, | Performed by: FAMILY MEDICINE

## 2021-09-21 PROCEDURE — 83036 HEMOGLOBIN GLYCOSYLATED A1C: CPT | Performed by: FAMILY MEDICINE

## 2021-09-21 PROCEDURE — 99999 PR PBB SHADOW E&M-EST. PATIENT-LVL IV: CPT | Mod: PBBFAC,,, | Performed by: FAMILY MEDICINE

## 2021-09-21 PROCEDURE — 3008F BODY MASS INDEX DOCD: CPT | Mod: CPTII,S$GLB,, | Performed by: FAMILY MEDICINE

## 2021-09-21 PROCEDURE — 4010F PR ACE/ARB THEARPY RXD/TAKEN: ICD-10-PCS | Mod: CPTII,S$GLB,, | Performed by: FAMILY MEDICINE

## 2021-09-21 PROCEDURE — 1159F MED LIST DOCD IN RCRD: CPT | Mod: CPTII,S$GLB,, | Performed by: FAMILY MEDICINE

## 2021-09-21 PROCEDURE — 84153 ASSAY OF PSA TOTAL: CPT | Performed by: FAMILY MEDICINE

## 2021-09-21 PROCEDURE — 3074F SYST BP LT 130 MM HG: CPT | Mod: CPTII,S$GLB,, | Performed by: FAMILY MEDICINE

## 2021-09-21 PROCEDURE — 3074F PR MOST RECENT SYSTOLIC BLOOD PRESSURE < 130 MM HG: ICD-10-PCS | Mod: CPTII,S$GLB,, | Performed by: FAMILY MEDICINE

## 2021-09-21 PROCEDURE — 99999 PR PBB SHADOW E&M-EST. PATIENT-LVL IV: ICD-10-PCS | Mod: PBBFAC,,, | Performed by: FAMILY MEDICINE

## 2021-09-21 PROCEDURE — 84443 ASSAY THYROID STIM HORMONE: CPT | Performed by: FAMILY MEDICINE

## 2021-09-21 PROCEDURE — 4010F ACE/ARB THERAPY RXD/TAKEN: CPT | Mod: CPTII,S$GLB,, | Performed by: FAMILY MEDICINE

## 2021-09-23 ENCOUNTER — OFFICE VISIT (OUTPATIENT)
Dept: UROLOGY | Facility: CLINIC | Age: 61
End: 2021-09-23
Payer: COMMERCIAL

## 2021-09-23 VITALS
HEIGHT: 71 IN | SYSTOLIC BLOOD PRESSURE: 117 MMHG | DIASTOLIC BLOOD PRESSURE: 70 MMHG | WEIGHT: 231.25 LBS | HEART RATE: 72 BPM | BODY MASS INDEX: 32.37 KG/M2

## 2021-09-23 DIAGNOSIS — N52.9 ERECTILE DYSFUNCTION, UNSPECIFIED ERECTILE DYSFUNCTION TYPE: ICD-10-CM

## 2021-09-23 PROCEDURE — 1159F MED LIST DOCD IN RCRD: CPT | Mod: CPTII,S$GLB,, | Performed by: UROLOGY

## 2021-09-23 PROCEDURE — 4010F PR ACE/ARB THEARPY RXD/TAKEN: ICD-10-PCS | Mod: CPTII,S$GLB,, | Performed by: UROLOGY

## 2021-09-23 PROCEDURE — 99999 PR PBB SHADOW E&M-EST. PATIENT-LVL IV: CPT | Mod: PBBFAC,,, | Performed by: UROLOGY

## 2021-09-23 PROCEDURE — 3074F PR MOST RECENT SYSTOLIC BLOOD PRESSURE < 130 MM HG: ICD-10-PCS | Mod: CPTII,S$GLB,, | Performed by: UROLOGY

## 2021-09-23 PROCEDURE — 99204 PR OFFICE/OUTPT VISIT, NEW, LEVL IV, 45-59 MIN: ICD-10-PCS | Mod: S$GLB,,, | Performed by: UROLOGY

## 2021-09-23 PROCEDURE — 3008F PR BODY MASS INDEX (BMI) DOCUMENTED: ICD-10-PCS | Mod: CPTII,S$GLB,, | Performed by: UROLOGY

## 2021-09-23 PROCEDURE — 3078F DIAST BP <80 MM HG: CPT | Mod: CPTII,S$GLB,, | Performed by: UROLOGY

## 2021-09-23 PROCEDURE — 99999 PR PBB SHADOW E&M-EST. PATIENT-LVL IV: ICD-10-PCS | Mod: PBBFAC,,, | Performed by: UROLOGY

## 2021-09-23 PROCEDURE — 4010F ACE/ARB THERAPY RXD/TAKEN: CPT | Mod: CPTII,S$GLB,, | Performed by: UROLOGY

## 2021-09-23 PROCEDURE — 3051F HG A1C>EQUAL 7.0%<8.0%: CPT | Mod: CPTII,S$GLB,, | Performed by: UROLOGY

## 2021-09-23 PROCEDURE — 1159F PR MEDICATION LIST DOCUMENTED IN MEDICAL RECORD: ICD-10-PCS | Mod: CPTII,S$GLB,, | Performed by: UROLOGY

## 2021-09-23 PROCEDURE — 3008F BODY MASS INDEX DOCD: CPT | Mod: CPTII,S$GLB,, | Performed by: UROLOGY

## 2021-09-23 PROCEDURE — 3074F SYST BP LT 130 MM HG: CPT | Mod: CPTII,S$GLB,, | Performed by: UROLOGY

## 2021-09-23 PROCEDURE — 3078F PR MOST RECENT DIASTOLIC BLOOD PRESSURE < 80 MM HG: ICD-10-PCS | Mod: CPTII,S$GLB,, | Performed by: UROLOGY

## 2021-09-23 PROCEDURE — 99204 OFFICE O/P NEW MOD 45 MIN: CPT | Mod: S$GLB,,, | Performed by: UROLOGY

## 2021-09-23 PROCEDURE — 3051F PR MOST RECENT HEMOGLOBIN A1C LEVEL 7.0 - < 8.0%: ICD-10-PCS | Mod: CPTII,S$GLB,, | Performed by: UROLOGY

## 2021-09-25 ENCOUNTER — TELEPHONE (OUTPATIENT)
Dept: INTERNAL MEDICINE | Facility: CLINIC | Age: 61
End: 2021-09-25

## 2021-09-27 ENCOUNTER — TELEPHONE (OUTPATIENT)
Dept: INTERNAL MEDICINE | Facility: CLINIC | Age: 61
End: 2021-09-27

## 2021-09-27 DIAGNOSIS — E11.9 TYPE 2 DIABETES MELLITUS WITHOUT COMPLICATION, WITHOUT LONG-TERM CURRENT USE OF INSULIN: Primary | ICD-10-CM

## 2021-09-29 RX ORDER — SEMAGLUTIDE 1.34 MG/ML
INJECTION, SOLUTION SUBCUTANEOUS
Qty: 3 PEN | Refills: 3 | Status: SHIPPED | OUTPATIENT
Start: 2021-09-29 | End: 2021-12-09 | Stop reason: SDUPTHER

## 2021-10-13 RX ORDER — METFORMIN HYDROCHLORIDE 1000 MG/1
TABLET ORAL
Qty: 180 TABLET | Refills: 1 | Status: SHIPPED | OUTPATIENT
Start: 2021-10-13 | End: 2022-05-17

## 2021-10-15 ENCOUNTER — PATIENT MESSAGE (OUTPATIENT)
Dept: INTERNAL MEDICINE | Facility: CLINIC | Age: 61
End: 2021-10-15

## 2021-10-15 DIAGNOSIS — R11.0 NAUSEA: Primary | ICD-10-CM

## 2021-10-16 ENCOUNTER — PATIENT MESSAGE (OUTPATIENT)
Dept: INTERNAL MEDICINE | Facility: CLINIC | Age: 61
End: 2021-10-16
Payer: COMMERCIAL

## 2021-10-16 RX ORDER — PROMETHAZINE HYDROCHLORIDE 12.5 MG/1
TABLET ORAL
Qty: 40 TABLET | Refills: 1 | Status: SHIPPED | OUTPATIENT
Start: 2021-10-16 | End: 2022-03-23

## 2021-10-18 ENCOUNTER — PATIENT MESSAGE (OUTPATIENT)
Dept: ADMINISTRATIVE | Facility: HOSPITAL | Age: 61
End: 2021-10-18
Payer: COMMERCIAL

## 2021-10-28 ENCOUNTER — LAB VISIT (OUTPATIENT)
Dept: LAB | Facility: HOSPITAL | Age: 61
End: 2021-10-28
Attending: FAMILY MEDICINE
Payer: COMMERCIAL

## 2021-10-28 DIAGNOSIS — E11.9 TYPE 2 DIABETES MELLITUS WITHOUT COMPLICATION, WITHOUT LONG-TERM CURRENT USE OF INSULIN: ICD-10-CM

## 2021-10-28 LAB
ESTIMATED AVG GLUCOSE: 163 MG/DL (ref 68–131)
HBA1C MFR BLD: 7.3 % (ref 4–5.6)

## 2021-10-28 PROCEDURE — 83036 HEMOGLOBIN GLYCOSYLATED A1C: CPT | Performed by: FAMILY MEDICINE

## 2021-10-28 PROCEDURE — 36415 COLL VENOUS BLD VENIPUNCTURE: CPT | Performed by: FAMILY MEDICINE

## 2021-11-09 ENCOUNTER — TELEPHONE (OUTPATIENT)
Dept: INTERNAL MEDICINE | Facility: CLINIC | Age: 61
End: 2021-11-09
Payer: COMMERCIAL

## 2021-11-16 ENCOUNTER — HOSPITAL ENCOUNTER (OUTPATIENT)
Dept: CARDIOLOGY | Facility: CLINIC | Age: 61
Discharge: HOME | End: 2021-11-16
Payer: COMMERCIAL

## 2021-11-16 ENCOUNTER — OFFICE VISIT (OUTPATIENT)
Dept: ELECTROPHYSIOLOGY | Facility: CLINIC | Age: 61
End: 2021-11-16
Payer: COMMERCIAL

## 2021-11-16 VITALS
WEIGHT: 228.38 LBS | DIASTOLIC BLOOD PRESSURE: 66 MMHG | HEIGHT: 71 IN | HEART RATE: 79 BPM | BODY MASS INDEX: 31.97 KG/M2 | SYSTOLIC BLOOD PRESSURE: 126 MMHG

## 2021-11-16 DIAGNOSIS — I48.0 PAROXYSMAL ATRIAL FIBRILLATION: ICD-10-CM

## 2021-11-16 DIAGNOSIS — I47.29 NONSUSTAINED VENTRICULAR TACHYCARDIA: ICD-10-CM

## 2021-11-16 DIAGNOSIS — I49.8 OTHER SPECIFIED CARDIAC ARRHYTHMIAS: ICD-10-CM

## 2021-11-16 DIAGNOSIS — I49.3 FREQUENT PVCS: Primary | ICD-10-CM

## 2021-11-16 DIAGNOSIS — I10 ESSENTIAL HYPERTENSION: ICD-10-CM

## 2021-11-16 DIAGNOSIS — E11.9 TYPE 2 DIABETES MELLITUS WITHOUT COMPLICATION, WITHOUT LONG-TERM CURRENT USE OF INSULIN: ICD-10-CM

## 2021-11-16 DIAGNOSIS — I50.42 CHRONIC COMBINED SYSTOLIC AND DIASTOLIC HEART FAILURE: ICD-10-CM

## 2021-11-16 PROCEDURE — 1160F RVW MEDS BY RX/DR IN RCRD: CPT | Mod: CPTII,S$GLB,, | Performed by: INTERNAL MEDICINE

## 2021-11-16 PROCEDURE — 3078F PR MOST RECENT DIASTOLIC BLOOD PRESSURE < 80 MM HG: ICD-10-PCS | Mod: CPTII,S$GLB,, | Performed by: INTERNAL MEDICINE

## 2021-11-16 PROCEDURE — 99214 OFFICE O/P EST MOD 30 MIN: CPT | Mod: S$GLB,,, | Performed by: INTERNAL MEDICINE

## 2021-11-16 PROCEDURE — 3078F DIAST BP <80 MM HG: CPT | Mod: CPTII,S$GLB,, | Performed by: INTERNAL MEDICINE

## 2021-11-16 PROCEDURE — 93010 RHYTHM STRIP: ICD-10-PCS | Mod: S$GLB,,, | Performed by: INTERNAL MEDICINE

## 2021-11-16 PROCEDURE — 3074F PR MOST RECENT SYSTOLIC BLOOD PRESSURE < 130 MM HG: ICD-10-PCS | Mod: CPTII,S$GLB,, | Performed by: INTERNAL MEDICINE

## 2021-11-16 PROCEDURE — 93005 RHYTHM STRIP: ICD-10-PCS | Mod: S$GLB,,, | Performed by: INTERNAL MEDICINE

## 2021-11-16 PROCEDURE — 3051F PR MOST RECENT HEMOGLOBIN A1C LEVEL 7.0 - < 8.0%: ICD-10-PCS | Mod: CPTII,S$GLB,, | Performed by: INTERNAL MEDICINE

## 2021-11-16 PROCEDURE — 93005 ELECTROCARDIOGRAM TRACING: CPT | Mod: S$GLB,,, | Performed by: INTERNAL MEDICINE

## 2021-11-16 PROCEDURE — 3008F BODY MASS INDEX DOCD: CPT | Mod: CPTII,S$GLB,, | Performed by: INTERNAL MEDICINE

## 2021-11-16 PROCEDURE — 3008F PR BODY MASS INDEX (BMI) DOCUMENTED: ICD-10-PCS | Mod: CPTII,S$GLB,, | Performed by: INTERNAL MEDICINE

## 2021-11-16 PROCEDURE — 1160F PR REVIEW ALL MEDS BY PRESCRIBER/CLIN PHARMACIST DOCUMENTED: ICD-10-PCS | Mod: CPTII,S$GLB,, | Performed by: INTERNAL MEDICINE

## 2021-11-16 PROCEDURE — 4010F ACE/ARB THERAPY RXD/TAKEN: CPT | Mod: CPTII,S$GLB,, | Performed by: INTERNAL MEDICINE

## 2021-11-16 PROCEDURE — 99999 PR PBB SHADOW E&M-EST. PATIENT-LVL III: CPT | Mod: PBBFAC,,, | Performed by: INTERNAL MEDICINE

## 2021-11-16 PROCEDURE — 99214 PR OFFICE/OUTPT VISIT, EST, LEVL IV, 30-39 MIN: ICD-10-PCS | Mod: S$GLB,,, | Performed by: INTERNAL MEDICINE

## 2021-11-16 PROCEDURE — 93010 ELECTROCARDIOGRAM REPORT: CPT | Mod: S$GLB,,, | Performed by: INTERNAL MEDICINE

## 2021-11-16 PROCEDURE — 4010F PR ACE/ARB THEARPY RXD/TAKEN: ICD-10-PCS | Mod: CPTII,S$GLB,, | Performed by: INTERNAL MEDICINE

## 2021-11-16 PROCEDURE — 1159F PR MEDICATION LIST DOCUMENTED IN MEDICAL RECORD: ICD-10-PCS | Mod: CPTII,S$GLB,, | Performed by: INTERNAL MEDICINE

## 2021-11-16 PROCEDURE — 99999 PR PBB SHADOW E&M-EST. PATIENT-LVL III: ICD-10-PCS | Mod: PBBFAC,,, | Performed by: INTERNAL MEDICINE

## 2021-11-16 PROCEDURE — 1159F MED LIST DOCD IN RCRD: CPT | Mod: CPTII,S$GLB,, | Performed by: INTERNAL MEDICINE

## 2021-11-16 PROCEDURE — 3074F SYST BP LT 130 MM HG: CPT | Mod: CPTII,S$GLB,, | Performed by: INTERNAL MEDICINE

## 2021-11-16 PROCEDURE — 3051F HG A1C>EQUAL 7.0%<8.0%: CPT | Mod: CPTII,S$GLB,, | Performed by: INTERNAL MEDICINE

## 2021-11-17 ENCOUNTER — TELEPHONE (OUTPATIENT)
Dept: RESEARCH | Facility: HOSPITAL | Age: 61
End: 2021-11-17
Payer: COMMERCIAL

## 2021-11-18 ENCOUNTER — RESEARCH ENCOUNTER (OUTPATIENT)
Dept: RESEARCH | Facility: HOSPITAL | Age: 61
End: 2021-11-18

## 2021-11-18 ENCOUNTER — HOSPITAL ENCOUNTER (OUTPATIENT)
Dept: CARDIOLOGY | Facility: HOSPITAL | Age: 61
Discharge: HOME | End: 2021-11-18
Attending: INTERNAL MEDICINE
Payer: COMMERCIAL

## 2021-11-18 VITALS
SYSTOLIC BLOOD PRESSURE: 130 MMHG | DIASTOLIC BLOOD PRESSURE: 70 MMHG | BODY MASS INDEX: 31.92 KG/M2 | HEART RATE: 75 BPM | HEIGHT: 71 IN | WEIGHT: 228 LBS

## 2021-11-18 DIAGNOSIS — I48.0 PAROXYSMAL ATRIAL FIBRILLATION: ICD-10-CM

## 2021-11-18 LAB
ASCENDING AORTA: 3.62 CM
AV INDEX (PROSTH): 0.78
AV MEAN GRADIENT: 1 MMHG
AV PEAK GRADIENT: 3 MMHG
AV VALVE AREA: 3.36 CM2
AV VELOCITY RATIO: 0.76
BSA FOR ECHO PROCEDURE: 2.28 M2
CV ECHO LV RWT: 0.37 CM
DOP CALC AO PEAK VEL: 0.83 M/S
DOP CALC AO VTI: 14.94 CM
DOP CALC LVOT AREA: 4.3 CM2
DOP CALC LVOT DIAMETER: 2.34 CM
DOP CALC LVOT PEAK VEL: 0.63 M/S
DOP CALC LVOT STROKE VOLUME: 50.2 CM3
DOP CALCLVOT PEAK VEL VTI: 11.68 CM
E WAVE DECELERATION TIME: 92.11 MSEC
E/A RATIO: 0.39
E/E' RATIO: 4.91 M/S
ECHO LV POSTERIOR WALL: 1.02 CM (ref 0.6–1.1)
EJECTION FRACTION: 40 %
FRACTIONAL SHORTENING: 23 % (ref 28–44)
INTERVENTRICULAR SEPTUM: 0.79 CM (ref 0.6–1.1)
IVRT: 142.72 MSEC
LA MAJOR: 5.36 CM
LA MINOR: 5.55 CM
LA WIDTH: 3.96 CM
LEFT ATRIUM SIZE: 4.01 CM
LEFT ATRIUM VOLUME INDEX MOD: 29.2 ML/M2
LEFT ATRIUM VOLUME INDEX: 33 ML/M2
LEFT ATRIUM VOLUME MOD: 65.01 CM3
LEFT ATRIUM VOLUME: 73.61 CM3
LEFT INTERNAL DIMENSION IN SYSTOLE: 4.22 CM (ref 2.1–4)
LEFT VENTRICLE DIASTOLIC VOLUME INDEX: 64.65 ML/M2
LEFT VENTRICLE DIASTOLIC VOLUME: 144.17 ML
LEFT VENTRICLE MASS INDEX: 83 G/M2
LEFT VENTRICLE SYSTOLIC VOLUME INDEX: 35.6 ML/M2
LEFT VENTRICLE SYSTOLIC VOLUME: 79.46 ML
LEFT VENTRICULAR INTERNAL DIMENSION IN DIASTOLE: 5.45 CM (ref 3.5–6)
LEFT VENTRICULAR MASS: 184.29 G
LV LATERAL E/E' RATIO: 3.86 M/S
LV SEPTAL E/E' RATIO: 6.75 M/S
MV A" WAVE DURATION": 16.27 MSEC
MV PEAK A VEL: 0.7 M/S
MV PEAK E VEL: 0.27 M/S
MV STENOSIS PRESSURE HALF TIME: 26.71 MS
MV VALVE AREA P 1/2 METHOD: 8.24 CM2
PISA TR MAX VEL: 2.05 M/S
PULM VEIN S/D RATIO: 1.56
PV PEAK D VEL: 0.43 M/S
PV PEAK S VEL: 0.67 M/S
RA MAJOR: 4.2 CM
RA PRESSURE: 3 MMHG
RA WIDTH: 3.48 CM
RIGHT VENTRICULAR END-DIASTOLIC DIMENSION: 3.62 CM
RV TISSUE DOPPLER FREE WALL SYSTOLIC VELOCITY 1 (APICAL 4 CHAMBER VIEW): 9.69 CM/S
SINUS: 3.81 CM
STJ: 3.36 CM
TDI LATERAL: 0.07 M/S
TDI SEPTAL: 0.04 M/S
TDI: 0.06 M/S
TR MAX PG: 17 MMHG
TRICUSPID ANNULAR PLANE SYSTOLIC EXCURSION: 1.78 CM
TV REST PULMONARY ARTERY PRESSURE: 20 MMHG

## 2021-11-18 PROCEDURE — 93306 ECHO (CUPID ONLY): ICD-10-PCS | Mod: 26,,, | Performed by: INTERNAL MEDICINE

## 2021-11-18 PROCEDURE — 93306 TTE W/DOPPLER COMPLETE: CPT

## 2021-11-18 PROCEDURE — 93306 TTE W/DOPPLER COMPLETE: CPT | Mod: 26,,, | Performed by: INTERNAL MEDICINE

## 2021-11-19 ENCOUNTER — TELEPHONE (OUTPATIENT)
Dept: ELECTROPHYSIOLOGY | Facility: CLINIC | Age: 61
End: 2021-11-19
Payer: COMMERCIAL

## 2021-11-29 ENCOUNTER — OFFICE VISIT (OUTPATIENT)
Dept: CARDIOLOGY | Facility: CLINIC | Age: 61
End: 2021-11-29
Payer: COMMERCIAL

## 2021-11-29 VITALS
DIASTOLIC BLOOD PRESSURE: 70 MMHG | HEART RATE: 105 BPM | SYSTOLIC BLOOD PRESSURE: 134 MMHG | BODY MASS INDEX: 31.95 KG/M2 | WEIGHT: 228.19 LBS | HEIGHT: 71 IN

## 2021-11-29 DIAGNOSIS — I47.29 NONSUSTAINED VENTRICULAR TACHYCARDIA: ICD-10-CM

## 2021-11-29 DIAGNOSIS — E11.9 TYPE 2 DIABETES MELLITUS WITHOUT COMPLICATION, WITHOUT LONG-TERM CURRENT USE OF INSULIN: ICD-10-CM

## 2021-11-29 DIAGNOSIS — I50.42 CHRONIC COMBINED SYSTOLIC AND DIASTOLIC HEART FAILURE: Primary | ICD-10-CM

## 2021-11-29 DIAGNOSIS — I49.3 FREQUENT PVCS: ICD-10-CM

## 2021-11-29 DIAGNOSIS — I48.0 PAROXYSMAL ATRIAL FIBRILLATION: ICD-10-CM

## 2021-11-29 DIAGNOSIS — I10 ESSENTIAL HYPERTENSION: ICD-10-CM

## 2021-11-29 DIAGNOSIS — E66.09 CLASS 1 OBESITY DUE TO EXCESS CALORIES WITH SERIOUS COMORBIDITY AND BODY MASS INDEX (BMI) OF 32.0 TO 32.9 IN ADULT: ICD-10-CM

## 2021-11-29 PROCEDURE — 99999 PR PBB SHADOW E&M-EST. PATIENT-LVL IV: ICD-10-PCS | Mod: PBBFAC,,, | Performed by: PHYSICIAN ASSISTANT

## 2021-11-29 PROCEDURE — 99214 PR OFFICE/OUTPT VISIT, EST, LEVL IV, 30-39 MIN: ICD-10-PCS | Mod: S$GLB,,, | Performed by: PHYSICIAN ASSISTANT

## 2021-11-29 PROCEDURE — 4010F ACE/ARB THERAPY RXD/TAKEN: CPT | Mod: CPTII,S$GLB,, | Performed by: PHYSICIAN ASSISTANT

## 2021-11-29 PROCEDURE — 4010F PR ACE/ARB THEARPY RXD/TAKEN: ICD-10-PCS | Mod: CPTII,S$GLB,, | Performed by: PHYSICIAN ASSISTANT

## 2021-11-29 PROCEDURE — 99214 OFFICE O/P EST MOD 30 MIN: CPT | Mod: S$GLB,,, | Performed by: PHYSICIAN ASSISTANT

## 2021-11-29 PROCEDURE — 99999 PR PBB SHADOW E&M-EST. PATIENT-LVL IV: CPT | Mod: PBBFAC,,, | Performed by: PHYSICIAN ASSISTANT

## 2021-11-29 RX ORDER — SPIRONOLACTONE 25 MG/1
25 TABLET ORAL DAILY
Qty: 30 TABLET | Refills: 11 | Status: SHIPPED | OUTPATIENT
Start: 2021-11-29 | End: 2022-12-20 | Stop reason: SDUPTHER

## 2021-12-09 DIAGNOSIS — E11.9 TYPE 2 DIABETES MELLITUS WITHOUT COMPLICATION, WITHOUT LONG-TERM CURRENT USE OF INSULIN: ICD-10-CM

## 2021-12-14 RX ORDER — SEMAGLUTIDE 1.34 MG/ML
INJECTION, SOLUTION SUBCUTANEOUS
Qty: 12 PEN | Refills: 1 | Status: SHIPPED | OUTPATIENT
Start: 2021-12-14 | End: 2022-03-23 | Stop reason: DRUGHIGH

## 2021-12-17 ENCOUNTER — TELEPHONE (OUTPATIENT)
Dept: CARDIOLOGY | Facility: CLINIC | Age: 61
End: 2021-12-17
Payer: COMMERCIAL

## 2021-12-17 ENCOUNTER — PATIENT MESSAGE (OUTPATIENT)
Dept: INTERNAL MEDICINE | Facility: CLINIC | Age: 61
End: 2021-12-17
Payer: COMMERCIAL

## 2021-12-17 ENCOUNTER — PATIENT MESSAGE (OUTPATIENT)
Dept: CARDIOLOGY | Facility: CLINIC | Age: 61
End: 2021-12-17
Payer: COMMERCIAL

## 2021-12-17 ENCOUNTER — TELEPHONE (OUTPATIENT)
Dept: INTERNAL MEDICINE | Facility: CLINIC | Age: 61
End: 2021-12-17
Payer: COMMERCIAL

## 2021-12-17 DIAGNOSIS — U07.1 COVID-19: Primary | ICD-10-CM

## 2021-12-19 ENCOUNTER — PATIENT MESSAGE (OUTPATIENT)
Dept: INTERNAL MEDICINE | Facility: CLINIC | Age: 61
End: 2021-12-19
Payer: COMMERCIAL

## 2021-12-19 DIAGNOSIS — U07.1 COVID-19: Primary | ICD-10-CM

## 2021-12-20 ENCOUNTER — PATIENT MESSAGE (OUTPATIENT)
Dept: INTERNAL MEDICINE | Facility: CLINIC | Age: 61
End: 2021-12-20
Payer: COMMERCIAL

## 2021-12-20 ENCOUNTER — TELEPHONE (OUTPATIENT)
Dept: INTERNAL MEDICINE | Facility: CLINIC | Age: 61
End: 2021-12-20
Payer: COMMERCIAL

## 2021-12-20 ENCOUNTER — PATIENT MESSAGE (OUTPATIENT)
Dept: CARDIOLOGY | Facility: CLINIC | Age: 61
End: 2021-12-20
Payer: COMMERCIAL

## 2021-12-21 ENCOUNTER — INFUSION (OUTPATIENT)
Dept: INFECTIOUS DISEASES | Facility: HOSPITAL | Age: 61
End: 2021-12-21
Payer: COMMERCIAL

## 2021-12-21 VITALS
HEIGHT: 71 IN | RESPIRATION RATE: 20 BRPM | WEIGHT: 225 LBS | HEART RATE: 93 BPM | TEMPERATURE: 99 F | BODY MASS INDEX: 31.5 KG/M2 | SYSTOLIC BLOOD PRESSURE: 127 MMHG | OXYGEN SATURATION: 97 % | DIASTOLIC BLOOD PRESSURE: 65 MMHG

## 2021-12-21 DIAGNOSIS — U07.1 COVID-19: Primary | ICD-10-CM

## 2021-12-21 PROCEDURE — 25000003 PHARM REV CODE 250: Performed by: INTERNAL MEDICINE

## 2021-12-21 PROCEDURE — 63600175 PHARM REV CODE 636 W HCPCS: Performed by: INTERNAL MEDICINE

## 2021-12-21 PROCEDURE — M0243 CASIRIVI AND IMDEVI INFUSION: HCPCS | Performed by: INTERNAL MEDICINE

## 2021-12-21 RX ORDER — SODIUM CHLORIDE 0.9 % (FLUSH) 0.9 %
10 SYRINGE (ML) INJECTION
Status: DISCONTINUED | OUTPATIENT
Start: 2021-12-21 | End: 2022-03-23

## 2021-12-21 RX ORDER — ACETAMINOPHEN 325 MG/1
650 TABLET ORAL ONCE AS NEEDED
Status: DISCONTINUED | OUTPATIENT
Start: 2021-12-21 | End: 2022-03-23

## 2021-12-21 RX ORDER — ALBUTEROL SULFATE 90 UG/1
2 AEROSOL, METERED RESPIRATORY (INHALATION)
Status: DISCONTINUED | OUTPATIENT
Start: 2021-12-21 | End: 2022-03-23

## 2021-12-21 RX ORDER — DIPHENHYDRAMINE HYDROCHLORIDE 50 MG/ML
25 INJECTION INTRAMUSCULAR; INTRAVENOUS ONCE AS NEEDED
Status: DISCONTINUED | OUTPATIENT
Start: 2021-12-21 | End: 2022-03-23

## 2021-12-21 RX ORDER — EPINEPHRINE 0.3 MG/.3ML
0.3 INJECTION SUBCUTANEOUS
Status: DISCONTINUED | OUTPATIENT
Start: 2021-12-21 | End: 2022-03-23

## 2021-12-21 RX ADMIN — CASIRIVIMAB AND IMDEVIMAB 600 MG: 600; 600 INJECTION, SOLUTION, CONCENTRATE INTRAVENOUS at 02:12

## 2022-02-21 ENCOUNTER — TELEPHONE (OUTPATIENT)
Dept: ELECTROPHYSIOLOGY | Facility: CLINIC | Age: 62
End: 2022-02-21
Payer: COMMERCIAL

## 2022-02-22 ENCOUNTER — HOSPITAL ENCOUNTER (OUTPATIENT)
Dept: CARDIOLOGY | Facility: CLINIC | Age: 62
Discharge: HOME | End: 2022-02-22
Payer: COMMERCIAL

## 2022-02-22 ENCOUNTER — OFFICE VISIT (OUTPATIENT)
Dept: ELECTROPHYSIOLOGY | Facility: CLINIC | Age: 62
End: 2022-02-22
Payer: COMMERCIAL

## 2022-02-22 VITALS
SYSTOLIC BLOOD PRESSURE: 127 MMHG | WEIGHT: 223.13 LBS | HEIGHT: 68 IN | DIASTOLIC BLOOD PRESSURE: 65 MMHG | BODY MASS INDEX: 33.82 KG/M2 | HEART RATE: 87 BPM

## 2022-02-22 DIAGNOSIS — I48.0 PAROXYSMAL ATRIAL FIBRILLATION: ICD-10-CM

## 2022-02-22 DIAGNOSIS — I50.42 CHRONIC COMBINED SYSTOLIC AND DIASTOLIC HEART FAILURE: Primary | ICD-10-CM

## 2022-02-22 DIAGNOSIS — I10 ESSENTIAL HYPERTENSION: ICD-10-CM

## 2022-02-22 DIAGNOSIS — E11.9 TYPE 2 DIABETES MELLITUS WITHOUT COMPLICATION, WITHOUT LONG-TERM CURRENT USE OF INSULIN: ICD-10-CM

## 2022-02-22 DIAGNOSIS — I47.29 NONSUSTAINED VENTRICULAR TACHYCARDIA: ICD-10-CM

## 2022-02-22 DIAGNOSIS — I49.8 OTHER SPECIFIED CARDIAC ARRHYTHMIAS: ICD-10-CM

## 2022-02-22 DIAGNOSIS — I49.3 FREQUENT PVCS: ICD-10-CM

## 2022-02-22 PROCEDURE — 99214 OFFICE O/P EST MOD 30 MIN: CPT | Mod: S$GLB,,, | Performed by: INTERNAL MEDICINE

## 2022-02-22 PROCEDURE — 3074F PR MOST RECENT SYSTOLIC BLOOD PRESSURE < 130 MM HG: ICD-10-PCS | Mod: CPTII,S$GLB,, | Performed by: INTERNAL MEDICINE

## 2022-02-22 PROCEDURE — 99999 PR PBB SHADOW E&M-EST. PATIENT-LVL IV: CPT | Mod: PBBFAC,,, | Performed by: INTERNAL MEDICINE

## 2022-02-22 PROCEDURE — 4010F PR ACE/ARB THEARPY RXD/TAKEN: ICD-10-PCS | Mod: CPTII,S$GLB,, | Performed by: INTERNAL MEDICINE

## 2022-02-22 PROCEDURE — 3008F BODY MASS INDEX DOCD: CPT | Mod: CPTII,S$GLB,, | Performed by: INTERNAL MEDICINE

## 2022-02-22 PROCEDURE — 99214 PR OFFICE/OUTPT VISIT, EST, LEVL IV, 30-39 MIN: ICD-10-PCS | Mod: S$GLB,,, | Performed by: INTERNAL MEDICINE

## 2022-02-22 PROCEDURE — 93010 ELECTROCARDIOGRAM REPORT: CPT | Mod: S$GLB,,, | Performed by: INTERNAL MEDICINE

## 2022-02-22 PROCEDURE — 93010 RHYTHM STRIP: ICD-10-PCS | Mod: S$GLB,,, | Performed by: INTERNAL MEDICINE

## 2022-02-22 PROCEDURE — 3072F LOW RISK FOR RETINOPATHY: CPT | Mod: CPTII,S$GLB,, | Performed by: INTERNAL MEDICINE

## 2022-02-22 PROCEDURE — 1159F PR MEDICATION LIST DOCUMENTED IN MEDICAL RECORD: ICD-10-PCS | Mod: CPTII,S$GLB,, | Performed by: INTERNAL MEDICINE

## 2022-02-22 PROCEDURE — 93005 RHYTHM STRIP: ICD-10-PCS | Mod: S$GLB,,, | Performed by: INTERNAL MEDICINE

## 2022-02-22 PROCEDURE — 3078F DIAST BP <80 MM HG: CPT | Mod: CPTII,S$GLB,, | Performed by: INTERNAL MEDICINE

## 2022-02-22 PROCEDURE — 3008F PR BODY MASS INDEX (BMI) DOCUMENTED: ICD-10-PCS | Mod: CPTII,S$GLB,, | Performed by: INTERNAL MEDICINE

## 2022-02-22 PROCEDURE — 4010F ACE/ARB THERAPY RXD/TAKEN: CPT | Mod: CPTII,S$GLB,, | Performed by: INTERNAL MEDICINE

## 2022-02-22 PROCEDURE — 1160F RVW MEDS BY RX/DR IN RCRD: CPT | Mod: CPTII,S$GLB,, | Performed by: INTERNAL MEDICINE

## 2022-02-22 PROCEDURE — 1159F MED LIST DOCD IN RCRD: CPT | Mod: CPTII,S$GLB,, | Performed by: INTERNAL MEDICINE

## 2022-02-22 PROCEDURE — 3051F PR MOST RECENT HEMOGLOBIN A1C LEVEL 7.0 - < 8.0%: ICD-10-PCS | Mod: CPTII,S$GLB,, | Performed by: INTERNAL MEDICINE

## 2022-02-22 PROCEDURE — 93005 ELECTROCARDIOGRAM TRACING: CPT | Mod: S$GLB,,, | Performed by: INTERNAL MEDICINE

## 2022-02-22 PROCEDURE — 3051F HG A1C>EQUAL 7.0%<8.0%: CPT | Mod: CPTII,S$GLB,, | Performed by: INTERNAL MEDICINE

## 2022-02-22 PROCEDURE — 3074F SYST BP LT 130 MM HG: CPT | Mod: CPTII,S$GLB,, | Performed by: INTERNAL MEDICINE

## 2022-02-22 PROCEDURE — 99999 PR PBB SHADOW E&M-EST. PATIENT-LVL IV: ICD-10-PCS | Mod: PBBFAC,,, | Performed by: INTERNAL MEDICINE

## 2022-02-22 PROCEDURE — 1160F PR REVIEW ALL MEDS BY PRESCRIBER/CLIN PHARMACIST DOCUMENTED: ICD-10-PCS | Mod: CPTII,S$GLB,, | Performed by: INTERNAL MEDICINE

## 2022-02-22 PROCEDURE — 3072F PR LOW RISK FOR RETINOPATHY: ICD-10-PCS | Mod: CPTII,S$GLB,, | Performed by: INTERNAL MEDICINE

## 2022-02-22 PROCEDURE — 3078F PR MOST RECENT DIASTOLIC BLOOD PRESSURE < 80 MM HG: ICD-10-PCS | Mod: CPTII,S$GLB,, | Performed by: INTERNAL MEDICINE

## 2022-02-22 NOTE — PROGRESS NOTES
Subjective:    Patient ID:  Michael Slaughter Jr. is a 61 y.o. male who presents for evaluation of Atrial Fibrillation    Referring Cardiologist: Severo Palafox III, MD  Primary Care Physician: Michael Deal MD    HPI    Prior Hx:  I had the pleasure of seeing Mr. Sukhjinder Michaels today in our electrophysiology clinic in follow-up for his paroxysmal atrial fibrillation and systolic heart failure. As you are aware he is a pleasant 61 year-old man with hypertension and diabetes who was in his usually state of health until he developed heart failure symptoms for the past 1-2 months (dyspnea on exertion, orthopnea). An echocardiogram performed 11/30/2020 noted a systolic function of 25% with severe LA enlargement, moderate MR, and AF. His ECG from the same day noted sinus rhythm with a PVC (RBBB concordant at least through V3, superior axis as assessed in lead II). A stress echocardiogram in 2013 noted a LVEF of 45% without ischemic changes. He was initiated on eliquis, metoprolol and valsartan.    I reviewed available ECGs in Epic which show sinus rhythm.    At our first clinic visit 12/2020 we elected to monitor and see how much AF he was having prior to starting anti-arrhythmic drug therapy. We also discussed monitoring his EF with goal-directed medical therapy. If his EF improved would discuss ILR implant.    Mr. Slaughter wore a 14 day Bardy patch which noted 34 episodes of AF with a burden of 3% with an average ventricular rate of 107 bpm and longest episode lasting 2.6 hours. There were also 11 episodes of atrial flutter with 2:1 AV conduction lasting up to 22 minutes. There was an 11% PAC and 8% polyfocal PVC burden along with 5 episodes of nonsustained VT lasting up to 27 beats. We discussed on the phone and initiated amiodarone with plan for ECHO after he was on GDMT for 90 days and proceed with ICD implant if EF had not recovered. He is on metoprolol and entresto. A nuclear stress test from 1/2021 showed no  ischemia/infarct. He followed up with me 2/2021 and he felt much better. Plan was if EF recovered we would stop amiodarone and then evaluate for recurrent arrhythmias. If EF remained 35% or less then would recommend ICD implantation.    Mr. Slaughter returned for follow-up 5/2021. He saw Dr. Palafox 4/23/2021 and his metoprolol was increased. An ECHO from 3/2021 noted some improvement in LV function (up to 35%). He continued to feel well. He exercised regularly.  We discussed that hopefully his EF would normalize with medical therapy. If so would stop amiodarone and then evaluate for recurrent arrhythmias. If EF remained 35% or less then would recommend ICD implantation (if remained NYHA class II).     Mr. Slaughter returned for follow-up 11/2021. He feels great. Reports no heart failure symptoms. Does physical work, mows his lawn, and walks 5-10k steps daily without difficulty. He walks up the stairs (3 flights) at work at the "[employer]" without shortness of breath.    Interim Hx:  Mr. Slaughter returns for follow-up. He feels great! No fatigue or dyspnea on exertion. No palpitations.     My interpretation of today's in clinic electrocardiogram is sinus rhythm with narrow QRS and no ectopy    Review of Systems   Constitutional: Negative for fever and malaise/fatigue.   HENT: Negative for congestion and sore throat.    Eyes: Negative for blurred vision and visual disturbance.   Cardiovascular: Negative for dyspnea on exertion, irregular heartbeat, near-syncope and orthopnea.   Respiratory: Negative for cough.    Hematologic/Lymphatic: Negative for bleeding problem. Does not bruise/bleed easily.   Skin: Negative.    Musculoskeletal: Negative.    Gastrointestinal: Negative for bloating, abdominal pain, hematochezia and melena.   Neurological: Negative for focal weakness.   Psychiatric/Behavioral: Negative.         Objective:    Physical Exam  Vitals reviewed.   Constitutional:       General: He is not in acute distress.      Appearance: He is well-developed. He is not diaphoretic.   HENT:      Head: Normocephalic and atraumatic.   Eyes:      General:         Right eye: No discharge.         Left eye: No discharge.      Conjunctiva/sclera: Conjunctivae normal.   Cardiovascular:      Rate and Rhythm: Normal rate and regular rhythm.      Heart sounds: No murmur heard.    No friction rub. No gallop.   Pulmonary:      Effort: Pulmonary effort is normal. No respiratory distress.      Breath sounds: Normal breath sounds. No wheezing or rales.   Abdominal:      General: Bowel sounds are normal. There is no distension.      Palpations: Abdomen is soft.      Tenderness: There is no abdominal tenderness.   Musculoskeletal:      Cervical back: Neck supple.   Skin:     General: Skin is warm and dry.   Neurological:      Mental Status: He is alert and oriented to person, place, and time.   Psychiatric:         Behavior: Behavior normal.         Thought Content: Thought content normal.         Judgment: Judgment normal.           Assessment:       1. Chronic combined systolic and diastolic heart failure    2. Frequent PVCs    3. Paroxysmal atrial fibrillation    4. Essential hypertension    5. Nonsustained ventricular tachycardia    6. Type 2 diabetes mellitus without complication, without long-term current use of insulin         Plan:       In summary, Mr. Slaughter is a pleasant 61 year-old man with hypertension and previous nonischemic mild systolic cardiomyopathy with LVEF of 45% in 2013 now with symptomatic severe systolic heart failure with LVEF of 25% with low burden pAF but high burden PACs, 8% polyfocal PVCs and nonsustained VT. He is now on amiodarone, metoprolol, entresto, and eliquis. He feels much better and his EF has improved to 40% (11/2021). Will stop amiodarone. If he has recurrence of symptomatic arrhythmias would try sotalol (would need an inpatient stay for initiation). Will update an ECHO again.    RTC in 3 months.     Thank you for  allowing me to participate in the care of this patient. Please do not hesitate to call me with any questions or concerns.    Didier Gonzalez MD, PhD  Cardiac Electrophysiology

## 2022-02-24 NOTE — PROGRESS NOTES
Cardiology Clinic Note  Reason for Visit: HFrEF    HPI:     Michael Slaughter Jr. is a 61 y.o. M, [facility]    He reports that he has been doing well.  Currently, he has a cough worse at night. Had sore throat then runny nose.    No edema, orthopnea, PND.  No symptoms of angina, arrhythmia, or claudication.    Walking in [facility] [place] [facility]. Does fine with this exertion.   Did gain weight. Follows low salt diet.    No GI/ bleeding. No nosebleeds.    In digital HTN program, systolics 110-120s systolic.    Medical: HFrEF (20% with recovery to 40%), DM2, HTN, GERD  Surgical: Reviewed, as below.  Family: Reviewed, as below. No premature CAD, HF, SCD.  Social: Reviewed, as below. Former smoker, quit 35y ago. Works as  at the [employer]  Side effects/allergies: lisinopril (cough)    ROS:    Constitution: Negative for fever or chills.  HENT: Negative for  headaches.  Eyes: Negative for blurred vision.   Cardiovascular: See above  Pulmonary: No SOB. Negative for cough.   Gastrointestinal: Negative for nausea/vomiting.   : Negative for dysuria.   Skin: Negative for rashes.  Neurological: Negative for focal weakness.  Psychological: Negative for depression.  PMH:     Past Medical History:   Diagnosis Date    Diabetes mellitus type II     GERD (gastroesophageal reflux disease)     Hypertension     Sinusitis      No past surgical history on file.  Allergies:     Review of patient's allergies indicates:   Allergen Reactions    Lisinopril      Other reaction(s): cough     Medications:     Current Outpatient Medications on File Prior to Visit   Medication Sig Dispense Refill    atorvastatin (LIPITOR) 10 MG tablet Take 1 tablet (10 mg total) by mouth once daily. 90 tablet 3    blood sugar diagnostic Strp 1 strip by Misc.(Non-Drug; Combo Route) route 5 (five) times daily. Whatever insurance allows 400 each 12    blood-glucose meter kit Use as instructed 1 each 0    ELIQUIS 5 mg  Tab TAKE 1 TABLET BY MOUTH TWICE A DAY 60 tablet 10    ENTRESTO  mg per tablet TAKE 1 TABLET BY MOUTH TWICE A DAY 60 tablet 11    furosemide (LASIX) 40 MG tablet Take 1 tablet (40 mg total) by mouth daily as needed. 90 tablet 3    metFORMIN (GLUCOPHAGE) 1000 MG tablet TAKE 1 TABLET BY MOUTH TWICE A DAY WITH MEALS 180 tablet 1    metoprolol succinate (TOPROL-XL) 50 MG 24 hr tablet Take 1 tablet (50 mg total) by mouth once daily. 90 tablet 3    ONETOUCH DELICA LANCETS 30 gauge Misc       promethazine (PHENERGAN) 12.5 MG Tab 1-2 tablets QID prn nausea 40 tablet 1    semaglutide (OZEMPIC) 0.25 mg or 0.5 mg(2 mg/1.5 mL) pen injector Inject 0.25 mg into the skin weekly for 4 weeks, then inject 0.5mg into the skin weekly thereafter (Patient taking differently: Inject 0.25 mg into the skin weekly for 4 weeks, then inject 0.5mg into the skin weekly thereafter) 12 pen 1    spironolactone (ALDACTONE) 25 MG tablet Take 1 tablet (25 mg total) by mouth once daily. 30 tablet 11    tadalafiL (CIALIS) 20 MG Tab Every other day as needed for ED 10 tablet 11     Current Facility-Administered Medications on File Prior to Visit   Medication Dose Route Frequency Provider Last Rate Last Admin    acetaminophen tablet 650 mg  650 mg Oral Once PRN Daniel Riley MD        albuterol inhaler 2 puff  2 puff Inhalation Q20 Min PRN Daniel Riley MD        diphenhydrAMINE injection 25 mg  25 mg Intravenous Once PRN Daniel Riley MD        EPINEPHrine (EPIPEN) 0.3 mg/0.3 mL pen injection 0.3 mg  0.3 mg Intramuscular PRN Daniel Riley MD        methylPREDNISolone sodium succinate injection 40 mg  40 mg Intravenous Once PRN Daniel Riley MD        sodium chloride 0.9% 500 mL flush bag   Intravenous PRN Daniel Riley MD        sodium chloride 0.9% flush 10 mL  10 mL Intravenous PRN Daniel Riley MD         Social History:     Social History     Tobacco Use    Smoking status: Former Smoker      "Types: Cigarettes    Smokeless tobacco: Never Used   Substance Use Topics    Alcohol use: Yes     Comment: daily     Family History:     Family History   Problem Relation Age of Onset    Cancer Mother         breast    Birth defects Maternal Grandmother         brain tumor    Heart disease Neg Hx     Stroke Neg Hx     Amblyopia Neg Hx     Blindness Neg Hx     Cataracts Neg Hx     Glaucoma Neg Hx     Hypertension Neg Hx     Macular degeneration Neg Hx     Retinal detachment Neg Hx     Strabismus Neg Hx      Physical Exam:   BP (!) 142/81 (BP Location: Left arm, Patient Position: Sitting, BP Method: Medium (Automatic))   Pulse 81   Ht 5' 8" (1.727 m)   Wt 101.2 kg (223 lb 1.7 oz)   BMI 33.92 kg/m²      Constitutional: No apparent distress, conversant  HEENT: Sclera anicteric, extraocular movements intact  Neck: No jugular venous distension, no carotid bruits  CV: Regular rate and rhythm, one ectopic beat  Pulm: Clear to auscultation bilaterally  GI: Abdomen soft, no palpable masses  Extremities: No lower extremity edema, warm with palpable pulses  Skin: No ecchymosis, erythema, or ulcers  Psych: Alert and oriented to person place location, appropriate affect  Neuro: No focal deficits    Labs:     Blood Tests:  Lab Results   Component Value Date     (H) 11/23/2020     09/21/2021    K 4.0 09/21/2021     09/21/2021    CO2 21 (L) 09/21/2021    BUN 16 09/21/2021    CREATININE 1.1 09/21/2021     09/21/2021    HGBA1C 7.3 (H) 10/28/2021    AST 10 09/21/2021    ALT 19 09/21/2021    ALBUMIN 4.1 09/21/2021    PROT 7.1 09/21/2021    BILITOT 0.7 09/21/2021    WBC 7.44 09/21/2021    HGB 15.0 09/21/2021    HCT 44.7 09/21/2021    MCV 80 (L) 09/21/2021     09/21/2021    PSA 0.46 09/21/2021    TSH 1.638 09/21/2021       Lab Results   Component Value Date    CHOL 116 (L) 09/21/2021    HDL 43 09/21/2021    TRIG 121 09/21/2021       Lab Results   Component Value Date    LDLCALC 48.8 (L) " 09/21/2021       Urine Tests:  Lab Results   Component Value Date    COLORU Yellow 09/17/2019    APPEARANCEUA Clear 09/17/2019    PHUR 5.0 09/17/2019    SPECGRAV 1.020 09/17/2019    PROTEINUA Negative 09/17/2019    GLUCUA 3+ (A) 09/17/2019    KETONESU Negative 09/17/2019    BILIRUBINUA Negative 09/17/2019    OCCULTUA Negative 09/17/2019    NITRITE Negative 09/17/2019    UROBILINOGEN 0.2 09/18/2009    LEUKOCYTESUR Negative 09/17/2019    PROTEINURINE 17 (H) 09/21/2021    CREATRANDUR 160.0 09/21/2021    UTPCR 0.11 09/21/2021       Imaging:     Echocardiogram  · TTE  11/18/21  · The left ventricle is normal in size with mildly decreased systolic function. The estimated ejection fraction is 40%.  · Normal right ventricular size with normal right ventricular systolic function.  · Grade I left ventricular diastolic dysfunction.  · The estimated PA systolic pressure is 20 mmHg.  · Normal central venous pressure (3 mmHg).    TTE 3/1/21  · Severe left atrial enlargement.  · The left ventricle is normal in size with moderately decreased systolic function. The estimated ejection fraction is 30-35%  · The quantitatively derived ejection fraction is 35%.  · Grade I left ventricular diastolic dysfunction.  · Normal right ventricular size with normal right ventricular systolic function.  · Mild-to-moderate mitral regurgitation.  · The estimated PA systolic pressure is 30 mm Hg.  · Low central venous pressure.    TTE 11/30/20  · Severe left atrial enlargement.  · The left ventricle is normal in size with severely decreased systolic function. The estimated ejection fraction is 25%.  · The quantitatively dervived ejection fraction is 20%.  · Diastolic pattern consistent with atrial fibrillation observed.  · Normal right ventricular size with normal right ventricular systolic function.  · Moderate mitral regurgitation.  · Normal central venous pressure (3 mmHg).  · The estimated PA systolic pressure is 30 mmHg.    Stress testing  SPECT  21    Normal myocardial perfusion scan. There is no evidence of myocardial ischemia or infarction.    The gated perfusion images showed an ejection fraction of 36% at rest. The gated perfusion images showed an ejection fraction of 37% post stress. Normal ejection fraction is greater than 51%.    There is moderate global hypokinesis at rest and stress.    LV cavity size is mildly enlarged at rest and mildly enlarged at stress.    The EKG portion of this study is abnormal but not diagnostic.    The patient reported no chest pain during the stress test.    There were no arrhythmias during stress.    There are no prior studies for comparison.    URIEL 13  CONCLUSIONS     1 - Mildly depressed resting left ventricular systolic function (EF 45%).     2 - Normal RV systolic function.     No evidence of stress induced myocardial ischemia.     Cath Lab  None    Other  Cardiac monitor 20  · Paroxysmal AF/AFL totalling a 3.7% burden  · Frequent PACs (11% burden)  · Frequent multifocal PVCs (8% burden)  · 5 episodes of nonsustained VT lasting up to 27 beats in duration.    EK22 - NSR with 1st deg AVB, NSTTA (personally reviewed)    Assessment:     Chronic combined systolic and diastolic heart failure  Essential hypertension  Frequent PVCs  Nonsustained ventricular tachycardia  Paroxysmal atrial fibrillation  Type 2 diabetes mellitus without complication, without long-term current use of insulin    Plan:     Chronic systolic heart failure  Euvolemic, stable    Continue toprol 50mg daily, entresto 97-103mg BID.   Continue lasix to 40mg prn for weight gain    Discussed low sodium diet, daily weights  Awaiting repeat echo    Essential hypertension  BP at goal in digital HTN program  Continue current meds  Low salt diet, exercise, weight loss    Paroxysmal atrial fibrillation  PVCs, NSVT  Seen on echo 20. 14 day monitor with ~4% AF/AFl burden  [id number] 3 (CHF, DM, HTN)    Stable,  asymptomatic  Continue eliquis 5mg BID, toprol  Followed by EP, Dr Gonzalez    Type 2 diabetes mellitus with complication, without long-term current use of insulin  Followed by PCP  LDL at goal. Cont statin.      Signed:   Severo Palafox MD  Cardiology     3/2/2022 9:00 AM    Follow-up:     Future Appointments   Date Time Provider Department Center   3/2/2022  8:30 AM Michael Palafox III, MD [facility] CARDIO [facility]   3/23/2022  8:30 AM Michael Deal MD [facility] PCW   4/20/2022  8:00 AM ECHO, [facility] ECHOSTR [facility]   6/16/2022  8:00 AM EKG, APPT [facility] EKG [facility]   6/16/2022  8:20 AM Didier Gonzalez MD Replaced by [facility] Count includes the [facility]

## 2022-03-02 ENCOUNTER — OFFICE VISIT (OUTPATIENT)
Dept: CARDIOLOGY | Facility: CLINIC | Age: 62
End: 2022-03-02
Payer: COMMERCIAL

## 2022-03-02 VITALS
HEIGHT: 68 IN | BODY MASS INDEX: 33.82 KG/M2 | DIASTOLIC BLOOD PRESSURE: 81 MMHG | HEART RATE: 81 BPM | SYSTOLIC BLOOD PRESSURE: 142 MMHG | WEIGHT: 223.13 LBS

## 2022-03-02 DIAGNOSIS — I50.42 CHRONIC COMBINED SYSTOLIC AND DIASTOLIC HEART FAILURE: Primary | ICD-10-CM

## 2022-03-02 DIAGNOSIS — I10 ESSENTIAL HYPERTENSION: ICD-10-CM

## 2022-03-02 DIAGNOSIS — I48.0 PAROXYSMAL ATRIAL FIBRILLATION: ICD-10-CM

## 2022-03-02 DIAGNOSIS — I47.29 NONSUSTAINED VENTRICULAR TACHYCARDIA: ICD-10-CM

## 2022-03-02 DIAGNOSIS — I49.3 FREQUENT PVCS: ICD-10-CM

## 2022-03-02 DIAGNOSIS — E11.9 TYPE 2 DIABETES MELLITUS WITHOUT COMPLICATION, WITHOUT LONG-TERM CURRENT USE OF INSULIN: ICD-10-CM

## 2022-03-02 PROBLEM — E66.09 CLASS 1 OBESITY DUE TO EXCESS CALORIES WITH SERIOUS COMORBIDITY AND BODY MASS INDEX (BMI) OF 32.0 TO 32.9 IN ADULT: Status: RESOLVED | Noted: 2020-05-18 | Resolved: 2022-03-02

## 2022-03-02 PROBLEM — E66.811 CLASS 1 OBESITY DUE TO EXCESS CALORIES WITH SERIOUS COMORBIDITY AND BODY MASS INDEX (BMI) OF 32.0 TO 32.9 IN ADULT: Status: RESOLVED | Noted: 2020-05-18 | Resolved: 2022-03-02

## 2022-03-02 PROCEDURE — 99214 OFFICE O/P EST MOD 30 MIN: CPT | Mod: S$GLB,,, | Performed by: INTERNAL MEDICINE

## 2022-03-02 PROCEDURE — 3051F PR MOST RECENT HEMOGLOBIN A1C LEVEL 7.0 - < 8.0%: ICD-10-PCS | Mod: CPTII,S$GLB,, | Performed by: INTERNAL MEDICINE

## 2022-03-02 PROCEDURE — 4010F PR ACE/ARB THEARPY RXD/TAKEN: ICD-10-PCS | Mod: CPTII,S$GLB,, | Performed by: INTERNAL MEDICINE

## 2022-03-02 PROCEDURE — 3077F PR MOST RECENT SYSTOLIC BLOOD PRESSURE >= 140 MM HG: ICD-10-PCS | Mod: CPTII,S$GLB,, | Performed by: INTERNAL MEDICINE

## 2022-03-02 PROCEDURE — 3072F PR LOW RISK FOR RETINOPATHY: ICD-10-PCS | Mod: CPTII,S$GLB,, | Performed by: INTERNAL MEDICINE

## 2022-03-02 PROCEDURE — 3072F LOW RISK FOR RETINOPATHY: CPT | Mod: CPTII,S$GLB,, | Performed by: INTERNAL MEDICINE

## 2022-03-02 PROCEDURE — 3079F PR MOST RECENT DIASTOLIC BLOOD PRESSURE 80-89 MM HG: ICD-10-PCS | Mod: CPTII,S$GLB,, | Performed by: INTERNAL MEDICINE

## 2022-03-02 PROCEDURE — 1159F PR MEDICATION LIST DOCUMENTED IN MEDICAL RECORD: ICD-10-PCS | Mod: CPTII,S$GLB,, | Performed by: INTERNAL MEDICINE

## 2022-03-02 PROCEDURE — 3051F HG A1C>EQUAL 7.0%<8.0%: CPT | Mod: CPTII,S$GLB,, | Performed by: INTERNAL MEDICINE

## 2022-03-02 PROCEDURE — 1159F MED LIST DOCD IN RCRD: CPT | Mod: CPTII,S$GLB,, | Performed by: INTERNAL MEDICINE

## 2022-03-02 PROCEDURE — 3079F DIAST BP 80-89 MM HG: CPT | Mod: CPTII,S$GLB,, | Performed by: INTERNAL MEDICINE

## 2022-03-02 PROCEDURE — 3077F SYST BP >= 140 MM HG: CPT | Mod: CPTII,S$GLB,, | Performed by: INTERNAL MEDICINE

## 2022-03-02 PROCEDURE — 99214 PR OFFICE/OUTPT VISIT, EST, LEVL IV, 30-39 MIN: ICD-10-PCS | Mod: S$GLB,,, | Performed by: INTERNAL MEDICINE

## 2022-03-02 PROCEDURE — 3008F BODY MASS INDEX DOCD: CPT | Mod: CPTII,S$GLB,, | Performed by: INTERNAL MEDICINE

## 2022-03-02 PROCEDURE — 99999 PR PBB SHADOW E&M-EST. PATIENT-LVL V: ICD-10-PCS | Mod: PBBFAC,,, | Performed by: INTERNAL MEDICINE

## 2022-03-02 PROCEDURE — 99999 PR PBB SHADOW E&M-EST. PATIENT-LVL V: CPT | Mod: PBBFAC,,, | Performed by: INTERNAL MEDICINE

## 2022-03-02 PROCEDURE — 4010F ACE/ARB THERAPY RXD/TAKEN: CPT | Mod: CPTII,S$GLB,, | Performed by: INTERNAL MEDICINE

## 2022-03-02 PROCEDURE — 3008F PR BODY MASS INDEX (BMI) DOCUMENTED: ICD-10-PCS | Mod: CPTII,S$GLB,, | Performed by: INTERNAL MEDICINE

## 2022-03-22 ENCOUNTER — IMMUNIZATION (OUTPATIENT)
Dept: INTERNAL MEDICINE | Facility: CLINIC | Age: 62
End: 2022-03-22
Payer: COMMERCIAL

## 2022-03-22 DIAGNOSIS — Z23 NEED FOR VACCINATION: Primary | ICD-10-CM

## 2022-03-22 PROCEDURE — 91305 COVID-19, MRNA, LNP-S, PF, 30 MCG/0.3 ML DOSE VACCINE (PFIZER): ICD-10-PCS | Mod: S$GLB,,, | Performed by: INTERNAL MEDICINE

## 2022-03-22 PROCEDURE — 0053A COVID-19, MRNA, LNP-S, PF, 30 MCG/0.3 ML DOSE VACCINE (PFIZER): ICD-10-PCS | Mod: CV19,S$GLB,, | Performed by: INTERNAL MEDICINE

## 2022-03-22 PROCEDURE — 0053A COVID-19, MRNA, LNP-S, PF, 30 MCG/0.3 ML DOSE VACCINE (PFIZER): CPT | Mod: CV19,S$GLB,, | Performed by: INTERNAL MEDICINE

## 2022-03-22 PROCEDURE — 91305 COVID-19, MRNA, LNP-S, PF, 30 MCG/0.3 ML DOSE VACCINE (PFIZER): CPT | Mod: S$GLB,,, | Performed by: INTERNAL MEDICINE

## 2022-03-23 ENCOUNTER — PATIENT MESSAGE (OUTPATIENT)
Dept: PHARMACY | Facility: CLINIC | Age: 62
End: 2022-03-23
Payer: COMMERCIAL

## 2022-03-23 ENCOUNTER — OFFICE VISIT (OUTPATIENT)
Dept: INTERNAL MEDICINE | Facility: CLINIC | Age: 62
End: 2022-03-23
Payer: COMMERCIAL

## 2022-03-23 ENCOUNTER — LAB VISIT (OUTPATIENT)
Dept: LAB | Facility: HOSPITAL | Age: 62
End: 2022-03-23
Attending: FAMILY MEDICINE
Payer: COMMERCIAL

## 2022-03-23 ENCOUNTER — PATIENT MESSAGE (OUTPATIENT)
Dept: INTERNAL MEDICINE | Facility: CLINIC | Age: 62
End: 2022-03-23

## 2022-03-23 VITALS
WEIGHT: 222.69 LBS | OXYGEN SATURATION: 97 % | BODY MASS INDEX: 33.75 KG/M2 | HEART RATE: 84 BPM | DIASTOLIC BLOOD PRESSURE: 88 MMHG | SYSTOLIC BLOOD PRESSURE: 126 MMHG | HEIGHT: 68 IN

## 2022-03-23 DIAGNOSIS — I50.42 CHRONIC COMBINED SYSTOLIC AND DIASTOLIC HEART FAILURE: ICD-10-CM

## 2022-03-23 DIAGNOSIS — E11.9 TYPE 2 DIABETES MELLITUS WITHOUT COMPLICATION, WITHOUT LONG-TERM CURRENT USE OF INSULIN: ICD-10-CM

## 2022-03-23 DIAGNOSIS — I10 ESSENTIAL HYPERTENSION: ICD-10-CM

## 2022-03-23 DIAGNOSIS — K21.9 GASTROESOPHAGEAL REFLUX DISEASE, UNSPECIFIED WHETHER ESOPHAGITIS PRESENT: ICD-10-CM

## 2022-03-23 DIAGNOSIS — I48.0 PAROXYSMAL ATRIAL FIBRILLATION: ICD-10-CM

## 2022-03-23 DIAGNOSIS — Z79.01 ANTICOAGULATED: ICD-10-CM

## 2022-03-23 DIAGNOSIS — I10 ESSENTIAL HYPERTENSION: Primary | ICD-10-CM

## 2022-03-23 LAB
ALBUMIN/CREAT UR: 7.1 UG/MG (ref 0–30)
ANION GAP SERPL CALC-SCNC: 11 MMOL/L (ref 8–16)
BASOPHILS # BLD AUTO: 0.06 K/UL (ref 0–0.2)
BASOPHILS NFR BLD: 0.6 % (ref 0–1.9)
BUN SERPL-MCNC: 15 MG/DL (ref 8–23)
CALCIUM SERPL-MCNC: 10.5 MG/DL (ref 8.7–10.5)
CHLORIDE SERPL-SCNC: 100 MMOL/L (ref 95–110)
CO2 SERPL-SCNC: 27 MMOL/L (ref 23–29)
CREAT SERPL-MCNC: 1.2 MG/DL (ref 0.5–1.4)
CREAT UR-MCNC: 112 MG/DL (ref 23–375)
DIFFERENTIAL METHOD: ABNORMAL
EOSINOPHIL # BLD AUTO: 0.1 K/UL (ref 0–0.5)
EOSINOPHIL NFR BLD: 1.3 % (ref 0–8)
ERYTHROCYTE [DISTWIDTH] IN BLOOD BY AUTOMATED COUNT: 14.2 % (ref 11.5–14.5)
EST. GFR  (AFRICAN AMERICAN): >60 ML/MIN/1.73 M^2
EST. GFR  (NON AFRICAN AMERICAN): >60 ML/MIN/1.73 M^2
ESTIMATED AVG GLUCOSE: 154 MG/DL (ref 68–131)
GLUCOSE SERPL-MCNC: 127 MG/DL (ref 70–110)
HBA1C MFR BLD: 7 % (ref 4–5.6)
HCT VFR BLD AUTO: 44.4 % (ref 40–54)
HGB BLD-MCNC: 14.5 G/DL (ref 14–18)
IMM GRANULOCYTES # BLD AUTO: 0.04 K/UL (ref 0–0.04)
IMM GRANULOCYTES NFR BLD AUTO: 0.4 % (ref 0–0.5)
LYMPHOCYTES # BLD AUTO: 1.3 K/UL (ref 1–4.8)
LYMPHOCYTES NFR BLD: 13 % (ref 18–48)
MCH RBC QN AUTO: 27.4 PG (ref 27–31)
MCHC RBC AUTO-ENTMCNC: 32.7 G/DL (ref 32–36)
MCV RBC AUTO: 84 FL (ref 82–98)
MICROALBUMIN UR DL<=1MG/L-MCNC: 8 UG/ML
MONOCYTES # BLD AUTO: 0.8 K/UL (ref 0.3–1)
MONOCYTES NFR BLD: 8.5 % (ref 4–15)
NEUTROPHILS # BLD AUTO: 7.5 K/UL (ref 1.8–7.7)
NEUTROPHILS NFR BLD: 76.2 % (ref 38–73)
NRBC BLD-RTO: 0 /100 WBC
PLATELET # BLD AUTO: 293 K/UL (ref 150–450)
PMV BLD AUTO: 11.7 FL (ref 9.2–12.9)
POTASSIUM SERPL-SCNC: 4.6 MMOL/L (ref 3.5–5.1)
RBC # BLD AUTO: 5.29 M/UL (ref 4.6–6.2)
SODIUM SERPL-SCNC: 138 MMOL/L (ref 136–145)
WBC # BLD AUTO: 9.9 K/UL (ref 3.9–12.7)

## 2022-03-23 PROCEDURE — 3008F PR BODY MASS INDEX (BMI) DOCUMENTED: ICD-10-PCS | Mod: CPTII,S$GLB,, | Performed by: FAMILY MEDICINE

## 2022-03-23 PROCEDURE — 1159F PR MEDICATION LIST DOCUMENTED IN MEDICAL RECORD: ICD-10-PCS | Mod: CPTII,S$GLB,, | Performed by: FAMILY MEDICINE

## 2022-03-23 PROCEDURE — 3051F PR MOST RECENT HEMOGLOBIN A1C LEVEL 7.0 - < 8.0%: ICD-10-PCS | Mod: CPTII,S$GLB,, | Performed by: FAMILY MEDICINE

## 2022-03-23 PROCEDURE — 3079F DIAST BP 80-89 MM HG: CPT | Mod: CPTII,S$GLB,, | Performed by: FAMILY MEDICINE

## 2022-03-23 PROCEDURE — 99214 PR OFFICE/OUTPT VISIT, EST, LEVL IV, 30-39 MIN: ICD-10-PCS | Mod: S$GLB,,, | Performed by: FAMILY MEDICINE

## 2022-03-23 PROCEDURE — 4010F PR ACE/ARB THEARPY RXD/TAKEN: ICD-10-PCS | Mod: CPTII,S$GLB,, | Performed by: FAMILY MEDICINE

## 2022-03-23 PROCEDURE — 82570 ASSAY OF URINE CREATININE: CPT | Performed by: FAMILY MEDICINE

## 2022-03-23 PROCEDURE — 3074F PR MOST RECENT SYSTOLIC BLOOD PRESSURE < 130 MM HG: ICD-10-PCS | Mod: CPTII,S$GLB,, | Performed by: FAMILY MEDICINE

## 2022-03-23 PROCEDURE — 99999 PR PBB SHADOW E&M-EST. PATIENT-LVL IV: ICD-10-PCS | Mod: PBBFAC,,, | Performed by: FAMILY MEDICINE

## 2022-03-23 PROCEDURE — 83036 HEMOGLOBIN GLYCOSYLATED A1C: CPT | Performed by: FAMILY MEDICINE

## 2022-03-23 PROCEDURE — 3079F PR MOST RECENT DIASTOLIC BLOOD PRESSURE 80-89 MM HG: ICD-10-PCS | Mod: CPTII,S$GLB,, | Performed by: FAMILY MEDICINE

## 2022-03-23 PROCEDURE — 36415 COLL VENOUS BLD VENIPUNCTURE: CPT | Performed by: FAMILY MEDICINE

## 2022-03-23 PROCEDURE — 85025 COMPLETE CBC W/AUTO DIFF WBC: CPT | Performed by: FAMILY MEDICINE

## 2022-03-23 PROCEDURE — 1159F MED LIST DOCD IN RCRD: CPT | Mod: CPTII,S$GLB,, | Performed by: FAMILY MEDICINE

## 2022-03-23 PROCEDURE — 4010F ACE/ARB THERAPY RXD/TAKEN: CPT | Mod: CPTII,S$GLB,, | Performed by: FAMILY MEDICINE

## 2022-03-23 PROCEDURE — 3072F PR LOW RISK FOR RETINOPATHY: ICD-10-PCS | Mod: CPTII,S$GLB,, | Performed by: FAMILY MEDICINE

## 2022-03-23 PROCEDURE — 99999 PR PBB SHADOW E&M-EST. PATIENT-LVL IV: CPT | Mod: PBBFAC,,, | Performed by: FAMILY MEDICINE

## 2022-03-23 PROCEDURE — 3074F SYST BP LT 130 MM HG: CPT | Mod: CPTII,S$GLB,, | Performed by: FAMILY MEDICINE

## 2022-03-23 PROCEDURE — 3051F HG A1C>EQUAL 7.0%<8.0%: CPT | Mod: CPTII,S$GLB,, | Performed by: FAMILY MEDICINE

## 2022-03-23 PROCEDURE — 80048 BASIC METABOLIC PNL TOTAL CA: CPT | Performed by: FAMILY MEDICINE

## 2022-03-23 PROCEDURE — 3072F LOW RISK FOR RETINOPATHY: CPT | Mod: CPTII,S$GLB,, | Performed by: FAMILY MEDICINE

## 2022-03-23 PROCEDURE — 99214 OFFICE O/P EST MOD 30 MIN: CPT | Mod: S$GLB,,, | Performed by: FAMILY MEDICINE

## 2022-03-23 PROCEDURE — 3008F BODY MASS INDEX DOCD: CPT | Mod: CPTII,S$GLB,, | Performed by: FAMILY MEDICINE

## 2022-03-23 PROCEDURE — 82043 UR ALBUMIN QUANTITATIVE: CPT | Performed by: FAMILY MEDICINE

## 2022-03-23 NOTE — PROGRESS NOTES
Subjective:       Patient ID: Michael Slaughter Jr. is a 61 y.o. male.    Chief Complaint: Follow-up    Patient is here for follow-up of their chronic diseases    Diabetes Management Status    Statin: Taking  ACE/ARB: Not taking    Screening or Prevention Patient's value Goal Complete/Controlled?   HgA1C Testing and Control   Lab Results   Component Value Date    HGBA1C 7.3 (H) 10/28/2021      Annually/Less than 8% Yes   Lipid profile : 09/21/2021 Annually Yes   LDL control Lab Results   Component Value Date    LDLCALC 48.8 (L) 09/21/2021    Annually/Less than 100 mg/dl  Yes   Nephropathy screening Lab Results   Component Value Date    LABMICR 22.0 09/17/2019     Lab Results   Component Value Date    PROTEINUA Negative 09/17/2019     Lab Results   Component Value Date    UTPCR 0.11 09/21/2021      Annually Yes   Blood pressure BP Readings from Last 1 Encounters:   03/02/22 (!) 142/81    Less than 140/90 No   Dilated retinal exam : 06/23/2021 Annually Yes   Foot exam   : 04/21/2021 Annually Yes         Social History     Tobacco Use    Smoking status: Former Smoker     Types: Cigarettes    Smokeless tobacco: Never Used   Substance Use Topics    Alcohol use: Yes     Comment: daily    Drug use: No       Family History   Problem Relation Age of Onset    Cancer Mother         breast    Birth defects Maternal Grandmother         brain tumor    Heart disease Neg Hx     Stroke Neg Hx     Amblyopia Neg Hx     Blindness Neg Hx     Cataracts Neg Hx     Glaucoma Neg Hx     Hypertension Neg Hx     Macular degeneration Neg Hx     Retinal detachment Neg Hx     Strabismus Neg Hx        No past surgical history on file.    Patient Active Problem List   Diagnosis    Essential hypertension    Type 2 diabetes mellitus without complication, without long-term current use of insulin    GERD (gastroesophageal reflux disease)    Sinusitis    ED (erectile dysfunction)    Colon cancer screening    Paroxysmal atrial  fibrillation    Chronic combined systolic and diastolic heart failure    Anticoagulated    Frequent PVCs    Nonsustained ventricular tachycardia    BMI 32.0-32.9,adult       Current Outpatient Medications on File Prior to Visit   Medication Sig Dispense Refill    atorvastatin (LIPITOR) 10 MG tablet Take 1 tablet (10 mg total) by mouth once daily. 90 tablet 3    blood sugar diagnostic Strp 1 strip by Misc.(Non-Drug; Combo Route) route 5 (five) times daily. Whatever insurance allows 400 each 12    blood-glucose meter kit Use as instructed 1 each 0    ELIQUIS 5 mg Tab TAKE 1 TABLET BY MOUTH TWICE A DAY 60 tablet 10    ENTRESTO  mg per tablet TAKE 1 TABLET BY MOUTH TWICE A DAY 60 tablet 11    furosemide (LASIX) 40 MG tablet Take 1 tablet (40 mg total) by mouth daily as needed. 90 tablet 3    metFORMIN (GLUCOPHAGE) 1000 MG tablet TAKE 1 TABLET BY MOUTH TWICE A DAY WITH MEALS 180 tablet 1    metoprolol succinate (TOPROL-XL) 50 MG 24 hr tablet Take 1 tablet (50 mg total) by mouth once daily. (Patient not taking: Reported on 3/2/2022) 90 tablet 3    ONETOUCH DELICA LANCETS 30 gauge Misc       promethazine (PHENERGAN) 12.5 MG Tab 1-2 tablets QID prn nausea (Patient not taking: Reported on 3/2/2022) 40 tablet 1    semaglutide (OZEMPIC) 0.25 mg or 0.5 mg(2 mg/1.5 mL) pen injector Inject 0.25 mg into the skin weekly for 4 weeks, then inject 0.5mg into the skin weekly thereafter (Patient taking differently: Inject 0.25 mg into the skin weekly for 4 weeks, then inject 0.5mg into the skin weekly thereafter) 12 pen 1    spironolactone (ALDACTONE) 25 MG tablet Take 1 tablet (25 mg total) by mouth once daily. 30 tablet 11    tadalafiL (CIALIS) 20 MG Tab Every other day as needed for ED (Patient not taking: Reported on 3/2/2022) 10 tablet 11     Current Facility-Administered Medications on File Prior to Visit   Medication Dose Route Frequency Provider Last Rate Last Admin    acetaminophen tablet 650 mg  650 mg  "Oral Once PRN Daniel Riley MD        albuterol inhaler 2 puff  2 puff Inhalation Q20 Min PRN Daniel Riley MD        diphenhydrAMINE injection 25 mg  25 mg Intravenous Once PRN Daniel Riley MD        EPINEPHrine (EPIPEN) 0.3 mg/0.3 mL pen injection 0.3 mg  0.3 mg Intramuscular PRN Daniel Riley MD        methylPREDNISolone sodium succinate injection 40 mg  40 mg Intravenous Once PRN Daniel Riley MD        sodium chloride 0.9% 500 mL flush bag   Intravenous PRN Daniel Riley MD        sodium chloride 0.9% flush 10 mL  10 mL Intravenous PRN Daniel Riley MD               Review of Systems   Constitutional: Negative for chills and fever.   HENT: Negative for ear pain.    Eyes: Negative for pain.   Respiratory: Negative for chest tightness.    Cardiovascular: Negative for chest pain.   Gastrointestinal: Negative for abdominal pain.   Genitourinary: Negative for flank pain.   Musculoskeletal: Negative for gait problem.   Neurological: Negative for syncope.   Psychiatric/Behavioral: Negative for behavioral problems.       Objective:     /88 (BP Location: Right arm, Patient Position: Sitting)   Pulse 84   Ht 5' 8" (1.727 m)   Wt 101 kg (222 lb 10.6 oz)   SpO2 97%   BMI 33.86 kg/m²     Physical Exam  Constitutional:       Appearance: He is well-developed.   HENT:      Head: Normocephalic and atraumatic.   Cardiovascular:      Rate and Rhythm: Normal rate.   Pulmonary:      Effort: No respiratory distress.      Breath sounds: Normal breath sounds. No wheezing or rales.   Abdominal:      Palpations: Abdomen is soft.   Musculoskeletal:      Cervical back: Neck supple.   Skin:     General: Skin is dry.   Neurological:      Mental Status: He is alert.   Psychiatric:         Behavior: Behavior normal.         Assessment:       1. Essential hypertension    2. Paroxysmal atrial fibrillation    3. Chronic combined systolic and diastolic heart failure    4. Type 2 diabetes " mellitus without complication, without long-term current use of insulin    5. Anticoagulated    6. Gastroesophageal reflux disease, unspecified whether esophagitis present    7. BMI 32.0-32.9,adult        Plan:       Michael was seen today for follow-up.    Diagnoses and all orders for this visit:    Essential hypertension  -controlled, stable, no change in meds - on digital HTN program  -     CBC Auto Differential; Future  -     Basic Metabolic Panel; Future    Paroxysmal atrial fibrillation  Chronic combined systolic and diastolic heart failure  Anticoagulated  -followed by cardiology    Type 2 diabetes mellitus without complication, without long-term current use of insulin  -     Basic Metabolic Panel; Future  -     Hemoglobin A1C; Future  -     Microalbumin/Creatinine Ratio, Urine  -     semaglutide (OZEMPIC) 1 mg/dose (2 mg/1.5 mL) PnIj; Inject 1 mg into the skin every 7 days. [increased form 0.5 weekly]    Gastroesophageal reflux disease, unspecified whether esophagitis present  -not bothering him at this time    BMI 32.0-32.9,adult  -increasing ozempic today should help him continue to lose weight    Need SHingrex vaccine  -he will check on pricing with pharmacy  -hold off a month getting is as he got his covid booster yesterday    Follow up in about 6 months (around 9/23/2022) for annual exam.

## 2022-04-20 ENCOUNTER — TELEPHONE (OUTPATIENT)
Dept: ELECTROPHYSIOLOGY | Facility: CLINIC | Age: 62
End: 2022-04-20
Payer: COMMERCIAL

## 2022-04-20 ENCOUNTER — PATIENT MESSAGE (OUTPATIENT)
Dept: ELECTROPHYSIOLOGY | Facility: CLINIC | Age: 62
End: 2022-04-20
Payer: COMMERCIAL

## 2022-04-20 ENCOUNTER — PATIENT MESSAGE (OUTPATIENT)
Dept: CARDIOLOGY | Facility: CLINIC | Age: 62
End: 2022-04-20
Payer: COMMERCIAL

## 2022-04-20 ENCOUNTER — HOSPITAL ENCOUNTER (OUTPATIENT)
Dept: CARDIOLOGY | Facility: HOSPITAL | Age: 62
Discharge: HOME | End: 2022-04-20
Attending: INTERNAL MEDICINE
Payer: COMMERCIAL

## 2022-04-20 VITALS
HEIGHT: 68 IN | SYSTOLIC BLOOD PRESSURE: 125 MMHG | HEART RATE: 75 BPM | DIASTOLIC BLOOD PRESSURE: 60 MMHG | WEIGHT: 223 LBS | BODY MASS INDEX: 33.8 KG/M2

## 2022-04-20 DIAGNOSIS — I50.42 CHRONIC COMBINED SYSTOLIC AND DIASTOLIC HEART FAILURE: ICD-10-CM

## 2022-04-20 LAB
ASCENDING AORTA: 3.24 CM
AV INDEX (PROSTH): 0.77
AV MEAN GRADIENT: 2 MMHG
AV PEAK GRADIENT: 3 MMHG
AV VALVE AREA: 3.58 CM2
AV VELOCITY RATIO: 0.72
BSA FOR ECHO PROCEDURE: 2.2 M2
CV ECHO LV RWT: 0.32 CM
DOP CALC AO PEAK VEL: 0.85 M/S
DOP CALC AO VTI: 15.33 CM
DOP CALC LVOT AREA: 4.6 CM2
DOP CALC LVOT DIAMETER: 2.43 CM
DOP CALC LVOT PEAK VEL: 0.61 M/S
DOP CALC LVOT STROKE VOLUME: 54.88 CM3
DOP CALCLVOT PEAK VEL VTI: 11.84 CM
ECHO LV POSTERIOR WALL: 0.86 CM (ref 0.6–1.1)
EJECTION FRACTION: 40 %
FRACTIONAL SHORTENING: 22 % (ref 28–44)
INTERVENTRICULAR SEPTUM: 0.94 CM (ref 0.6–1.1)
LA MAJOR: 5.49 CM
LA MINOR: 5.33 CM
LA WIDTH: 3.7 CM
LEFT ATRIUM SIZE: 4.23 CM
LEFT ATRIUM VOLUME INDEX MOD: 27 ML/M2
LEFT ATRIUM VOLUME INDEX: 33.6 ML/M2
LEFT ATRIUM VOLUME MOD: 57.69 CM3
LEFT ATRIUM VOLUME: 71.96 CM3
LEFT INTERNAL DIMENSION IN SYSTOLE: 4.17 CM (ref 2.1–4)
LEFT VENTRICLE DIASTOLIC VOLUME INDEX: 64.92 ML/M2
LEFT VENTRICLE DIASTOLIC VOLUME: 138.92 ML
LEFT VENTRICLE MASS INDEX: 83 G/M2
LEFT VENTRICLE SYSTOLIC VOLUME INDEX: 36.2 ML/M2
LEFT VENTRICLE SYSTOLIC VOLUME: 77.47 ML
LEFT VENTRICULAR INTERNAL DIMENSION IN DIASTOLE: 5.36 CM (ref 3.5–6)
LEFT VENTRICULAR MASS: 177.88 G
MV A" WAVE DURATION": 10.56 MSEC
PISA TR MAX VEL: 1.89 M/S
PULM VEIN S/D RATIO: 1.97
PV PEAK D VEL: 0.37 M/S
PV PEAK S VEL: 0.73 M/S
RA MAJOR: 4.78 CM
RA PRESSURE: 3 MMHG
RA WIDTH: 4.05 CM
RIGHT VENTRICULAR END-DIASTOLIC DIMENSION: 3.41 CM
RV TISSUE DOPPLER FREE WALL SYSTOLIC VELOCITY 1 (APICAL 4 CHAMBER VIEW): 8.96 CM/S
SINUS: 3.72 CM
STJ: 3.14 CM
TDI LATERAL: 0.06 M/S
TDI SEPTAL: 0.04 M/S
TDI: 0.05 M/S
TR MAX PG: 14 MMHG
TRICUSPID ANNULAR PLANE SYSTOLIC EXCURSION: 1.43 CM
TV REST PULMONARY ARTERY PRESSURE: 17 MMHG

## 2022-04-20 PROCEDURE — 93306 TTE W/DOPPLER COMPLETE: CPT | Mod: 26,,, | Performed by: INTERNAL MEDICINE

## 2022-04-20 PROCEDURE — 93306 ECHO (CUPID ONLY): ICD-10-PCS | Mod: 26,,, | Performed by: INTERNAL MEDICINE

## 2022-04-20 PROCEDURE — 93306 TTE W/DOPPLER COMPLETE: CPT

## 2022-04-20 NOTE — TELEPHONE ENCOUNTER
Attempted to contact patient to discuss echo results, but no answer received.  Patient portal message received from patient regarding echo results. Information regarding Echo results provided in patient portal response to message received.

## 2022-04-20 NOTE — PROGRESS NOTES
Please notify the patient that his heart function is 40% on his echo. Pretty similar to his last ECHO.

## 2022-04-20 NOTE — TELEPHONE ENCOUNTER
----- Message from Didier Gonzalez MD sent at 4/20/2022 12:13 PM CDT -----  Please notify the patient that his heart function is 40% on his echo. Pretty similar to his last ECHO.

## 2022-05-16 NOTE — TELEPHONE ENCOUNTER
No new care gaps identified.  [facility] Embedded Care Gaps. Reference number: [id number]. 5/16/2022   2:55:07 PM CDT

## 2022-05-17 RX ORDER — METFORMIN HYDROCHLORIDE 1000 MG/1
TABLET ORAL
Qty: 180 TABLET | Refills: 1 | Status: SHIPPED | OUTPATIENT
Start: 2022-05-17 | End: 2022-11-01

## 2022-05-17 NOTE — TELEPHONE ENCOUNTER
Refill Authorization Note   Michael Slaughter  is requesting a refill authorization.  Brief Assessment and Rationale for Refill:  Approve     Medication Therapy Plan:       Medication Reconciliation Completed: No   Comments:     No Care Gaps recommended.     Note composed:2:42 PM 05/17/2022

## 2022-06-16 ENCOUNTER — OFFICE VISIT (OUTPATIENT)
Dept: ELECTROPHYSIOLOGY | Facility: CLINIC | Age: 62
End: 2022-06-16
Payer: COMMERCIAL

## 2022-06-16 ENCOUNTER — HOSPITAL ENCOUNTER (OUTPATIENT)
Dept: CARDIOLOGY | Facility: CLINIC | Age: 62
Discharge: HOME | End: 2022-06-16
Payer: COMMERCIAL

## 2022-06-16 ENCOUNTER — CLINICAL SUPPORT (OUTPATIENT)
Dept: CARDIOLOGY | Facility: HOSPITAL | Age: 62
End: 2022-06-16
Attending: INTERNAL MEDICINE
Payer: COMMERCIAL

## 2022-06-16 VITALS
SYSTOLIC BLOOD PRESSURE: 112 MMHG | BODY MASS INDEX: 31.05 KG/M2 | DIASTOLIC BLOOD PRESSURE: 57 MMHG | HEIGHT: 71 IN | HEART RATE: 79 BPM | WEIGHT: 221.81 LBS

## 2022-06-16 DIAGNOSIS — I49.3 FREQUENT PVCS: ICD-10-CM

## 2022-06-16 DIAGNOSIS — I48.0 PAROXYSMAL ATRIAL FIBRILLATION: Primary | ICD-10-CM

## 2022-06-16 DIAGNOSIS — E66.09 CLASS 1 OBESITY DUE TO EXCESS CALORIES WITH SERIOUS COMORBIDITY AND BODY MASS INDEX (BMI) OF 30.0 TO 30.9 IN ADULT: ICD-10-CM

## 2022-06-16 DIAGNOSIS — I49.8 OTHER SPECIFIED CARDIAC ARRHYTHMIAS: ICD-10-CM

## 2022-06-16 DIAGNOSIS — I48.0 PAROXYSMAL ATRIAL FIBRILLATION: ICD-10-CM

## 2022-06-16 DIAGNOSIS — I47.29 NONSUSTAINED VENTRICULAR TACHYCARDIA: ICD-10-CM

## 2022-06-16 DIAGNOSIS — I10 ESSENTIAL HYPERTENSION: ICD-10-CM

## 2022-06-16 DIAGNOSIS — I50.42 CHRONIC COMBINED SYSTOLIC AND DIASTOLIC HEART FAILURE: ICD-10-CM

## 2022-06-16 DIAGNOSIS — E11.9 TYPE 2 DIABETES MELLITUS WITHOUT COMPLICATION, WITHOUT LONG-TERM CURRENT USE OF INSULIN: ICD-10-CM

## 2022-06-16 PROBLEM — E66.811 CLASS 1 OBESITY DUE TO EXCESS CALORIES WITH SERIOUS COMORBIDITY AND BODY MASS INDEX (BMI) OF 30.0 TO 30.9 IN ADULT: Status: ACTIVE | Noted: 2022-03-23

## 2022-06-16 PROCEDURE — 93244 EXT ECG>48HR<7D REV&INTERPJ: CPT | Mod: ,,, | Performed by: INTERNAL MEDICINE

## 2022-06-16 PROCEDURE — 93005 ELECTROCARDIOGRAM TRACING: CPT | Mod: S$GLB,,, | Performed by: INTERNAL MEDICINE

## 2022-06-16 PROCEDURE — 3074F PR MOST RECENT SYSTOLIC BLOOD PRESSURE < 130 MM HG: ICD-10-PCS | Mod: CPTII,S$GLB,, | Performed by: INTERNAL MEDICINE

## 2022-06-16 PROCEDURE — 3061F PR NEG MICROALBUMINURIA RESULT DOCUMENTED/REVIEW: ICD-10-PCS | Mod: CPTII,S$GLB,, | Performed by: INTERNAL MEDICINE

## 2022-06-16 PROCEDURE — 3078F PR MOST RECENT DIASTOLIC BLOOD PRESSURE < 80 MM HG: ICD-10-PCS | Mod: CPTII,S$GLB,, | Performed by: INTERNAL MEDICINE

## 2022-06-16 PROCEDURE — 99214 OFFICE O/P EST MOD 30 MIN: CPT | Mod: S$GLB,,, | Performed by: INTERNAL MEDICINE

## 2022-06-16 PROCEDURE — 1159F MED LIST DOCD IN RCRD: CPT | Mod: CPTII,S$GLB,, | Performed by: INTERNAL MEDICINE

## 2022-06-16 PROCEDURE — 3066F PR DOCUMENTATION OF TREATMENT FOR NEPHROPATHY: ICD-10-PCS | Mod: CPTII,S$GLB,, | Performed by: INTERNAL MEDICINE

## 2022-06-16 PROCEDURE — 3072F PR LOW RISK FOR RETINOPATHY: ICD-10-PCS | Mod: CPTII,S$GLB,, | Performed by: INTERNAL MEDICINE

## 2022-06-16 PROCEDURE — 3066F NEPHROPATHY DOC TX: CPT | Mod: CPTII,S$GLB,, | Performed by: INTERNAL MEDICINE

## 2022-06-16 PROCEDURE — 93244 CV CARDIAC MONITOR - 3-15 DAY ADULT (CUPID ONLY): ICD-10-PCS | Mod: ,,, | Performed by: INTERNAL MEDICINE

## 2022-06-16 PROCEDURE — 93005 RHYTHM STRIP: ICD-10-PCS | Mod: S$GLB,,, | Performed by: INTERNAL MEDICINE

## 2022-06-16 PROCEDURE — 93010 RHYTHM STRIP: ICD-10-PCS | Mod: S$GLB,,, | Performed by: INTERNAL MEDICINE

## 2022-06-16 PROCEDURE — 3051F HG A1C>EQUAL 7.0%<8.0%: CPT | Mod: CPTII,S$GLB,, | Performed by: INTERNAL MEDICINE

## 2022-06-16 PROCEDURE — 3061F NEG MICROALBUMINURIA REV: CPT | Mod: CPTII,S$GLB,, | Performed by: INTERNAL MEDICINE

## 2022-06-16 PROCEDURE — 99999 PR PBB SHADOW E&M-EST. PATIENT-LVL III: ICD-10-PCS | Mod: PBBFAC,,, | Performed by: INTERNAL MEDICINE

## 2022-06-16 PROCEDURE — 93010 ELECTROCARDIOGRAM REPORT: CPT | Mod: S$GLB,,, | Performed by: INTERNAL MEDICINE

## 2022-06-16 PROCEDURE — 1160F PR REVIEW ALL MEDS BY PRESCRIBER/CLIN PHARMACIST DOCUMENTED: ICD-10-PCS | Mod: CPTII,S$GLB,, | Performed by: INTERNAL MEDICINE

## 2022-06-16 PROCEDURE — 3051F PR MOST RECENT HEMOGLOBIN A1C LEVEL 7.0 - < 8.0%: ICD-10-PCS | Mod: CPTII,S$GLB,, | Performed by: INTERNAL MEDICINE

## 2022-06-16 PROCEDURE — 99214 PR OFFICE/OUTPT VISIT, EST, LEVL IV, 30-39 MIN: ICD-10-PCS | Mod: S$GLB,,, | Performed by: INTERNAL MEDICINE

## 2022-06-16 PROCEDURE — 3008F BODY MASS INDEX DOCD: CPT | Mod: CPTII,S$GLB,, | Performed by: INTERNAL MEDICINE

## 2022-06-16 PROCEDURE — 3008F PR BODY MASS INDEX (BMI) DOCUMENTED: ICD-10-PCS | Mod: CPTII,S$GLB,, | Performed by: INTERNAL MEDICINE

## 2022-06-16 PROCEDURE — 4010F PR ACE/ARB THEARPY RXD/TAKEN: ICD-10-PCS | Mod: CPTII,S$GLB,, | Performed by: INTERNAL MEDICINE

## 2022-06-16 PROCEDURE — 1160F RVW MEDS BY RX/DR IN RCRD: CPT | Mod: CPTII,S$GLB,, | Performed by: INTERNAL MEDICINE

## 2022-06-16 PROCEDURE — 3072F LOW RISK FOR RETINOPATHY: CPT | Mod: CPTII,S$GLB,, | Performed by: INTERNAL MEDICINE

## 2022-06-16 PROCEDURE — 4010F ACE/ARB THERAPY RXD/TAKEN: CPT | Mod: CPTII,S$GLB,, | Performed by: INTERNAL MEDICINE

## 2022-06-16 PROCEDURE — 1159F PR MEDICATION LIST DOCUMENTED IN MEDICAL RECORD: ICD-10-PCS | Mod: CPTII,S$GLB,, | Performed by: INTERNAL MEDICINE

## 2022-06-16 PROCEDURE — 3074F SYST BP LT 130 MM HG: CPT | Mod: CPTII,S$GLB,, | Performed by: INTERNAL MEDICINE

## 2022-06-16 PROCEDURE — 93242 EXT ECG>48HR<7D RECORDING: CPT

## 2022-06-16 PROCEDURE — 3078F DIAST BP <80 MM HG: CPT | Mod: CPTII,S$GLB,, | Performed by: INTERNAL MEDICINE

## 2022-06-16 PROCEDURE — 99999 PR PBB SHADOW E&M-EST. PATIENT-LVL III: CPT | Mod: PBBFAC,,, | Performed by: INTERNAL MEDICINE

## 2022-06-16 NOTE — PROGRESS NOTES
Subjective:    Patient ID:  Michael Slaughter Jr. is a 61 y.o. male who presents for evaluation of Atrial Fibrillation    Referring Cardiologist: Severo Palafox III, MD  Primary Care Physician: Michael Deal MD    HPI    Prior Hx:  I had the pleasure of seeing Mr. Sukhjinder Michaels today in our electrophysiology clinic in follow-up for his paroxysmal atrial fibrillation and systolic heart failure. As you are aware he is a pleasant 61 year-old man with hypertension and diabetes who was in his usually state of health until he developed heart failure symptoms for the past 1-2 months (dyspnea on exertion, orthopnea). An echocardiogram performed 11/30/2020 noted a systolic function of 25% with severe LA enlargement, moderate MR, and AF. His ECG from the same day noted sinus rhythm with a PVC (RBBB concordant at least through V3, superior axis as assessed in lead II). A stress echocardiogram in 2013 noted a LVEF of 45% without ischemic changes. He was initiated on eliquis, metoprolol and valsartan.    I reviewed available ECGs in Epic which show sinus rhythm.    At our first clinic visit 12/2020 we elected to monitor and see how much AF he was having prior to starting anti-arrhythmic drug therapy. We also discussed monitoring his EF with goal-directed medical therapy. If his EF improved would discuss ILR implant.    Mr. Slaughter wore a 14 day Bardy patch which noted 34 episodes of AF with a burden of 3% with an average ventricular rate of 107 bpm and longest episode lasting 2.6 hours. There were also 11 episodes of atrial flutter with 2:1 AV conduction lasting up to 22 minutes. There was an 11% PAC and 8% polyfocal PVC burden along with 5 episodes of nonsustained VT lasting up to 27 beats. We discussed on the phone and initiated amiodarone with plan for ECHO after he was on GDMT for 90 days and proceed with ICD implant if EF had not recovered. He is on metoprolol and entresto. A nuclear stress test from 1/2021 showed no  ischemia/infarct. He followed up with me 2/2021 and he felt much better. Plan was if EF recovered we would stop amiodarone and then evaluate for recurrent arrhythmias. If EF remained 35% or less then would recommend ICD implantation.    Mr. Slaughter returned for follow-up 5/2021. He saw Dr. Palafox 4/23/2021 and his metoprolol was increased. An ECHO from 3/2021 noted some improvement in LV function (up to 35%). He continued to feel well. He exercised regularly.  We discussed that hopefully his EF would normalize with medical therapy. If so would stop amiodarone and then evaluate for recurrent arrhythmias. If EF remained 35% or less then would recommend ICD implantation (if remained NYHA class II).     Mr. Slaughter returned for follow-up 11/2021. He feels great. Reports no heart failure symptoms. Does physical work, mows his lawn, and walks 5-10k steps daily without difficulty. He walks up the stairs (3 flights) at work at the [employer] without shortness of breath.    Mr. Slaughter returned for follow-up 2/2022. He felt great! No fatigue or dyspnea on exertion. No palpitations. We stopped amiodarone with plan to try sotalol for symptomatic arrhythmia recurrence (would require inpatient stay).    Interim Hx:  Mr Slaughter returns for follow-up. ECHO 4/2022 noted a LVEF of 40%. He continues to work, mows his lawn, and is very physically active. Feels well. He has no symptomatic arrhythmia recurrences.    My interpretation of today's in clinic electrocardiogram is sinus rhythm with narrow QRS and no ectopy.    Review of Systems   Constitutional: Negative for fever and malaise/fatigue.   HENT: Negative for congestion and sore throat.    Eyes: Negative for blurred vision and visual disturbance.   Cardiovascular: Negative for dyspnea on exertion, irregular heartbeat, near-syncope and orthopnea.   Respiratory: Negative for cough.    Hematologic/Lymphatic: Negative for bleeding problem. Does not bruise/bleed easily.   Skin:  Negative.    Musculoskeletal: Negative.    Gastrointestinal: Negative for bloating, abdominal pain, hematochezia and melena.   Neurological: Negative for focal weakness.   Psychiatric/Behavioral: Negative.         Objective:    Physical Exam  Vitals reviewed.   Constitutional:       General: He is not in acute distress.     Appearance: He is well-developed. He is not diaphoretic.   HENT:      Head: Normocephalic and atraumatic.   Eyes:      General:         Right eye: No discharge.         Left eye: No discharge.      Conjunctiva/sclera: Conjunctivae normal.   Cardiovascular:      Rate and Rhythm: Normal rate and regular rhythm.      Heart sounds: No murmur heard.    No friction rub. No gallop.   Pulmonary:      Effort: Pulmonary effort is normal. No respiratory distress.      Breath sounds: Normal breath sounds. No wheezing or rales.   Abdominal:      General: Bowel sounds are normal. There is no distension.      Palpations: Abdomen is soft.      Tenderness: There is no abdominal tenderness.   Musculoskeletal:      Cervical back: Neck supple.   Skin:     General: Skin is warm and dry.   Neurological:      Mental Status: He is alert and oriented to person, place, and time.   Psychiatric:         Behavior: Behavior normal.         Thought Content: Thought content normal.         Judgment: Judgment normal.           Assessment:       1. Paroxysmal atrial fibrillation    2. Essential hypertension    3. Chronic combined systolic and diastolic heart failure    4. Frequent PVCs    5. Nonsustained ventricular tachycardia    6. Type 2 diabetes mellitus without complication, without long-term current use of insulin    7. Class 1 obesity due to excess calories with serious comorbidity and body mass index (BMI) of 30.0 to 30.9 in adult         Plan:       In summary, Mr. Slaughter is a pleasant 61 year-old man with hypertension and previous nonischemic mild systolic cardiomyopathy with LVEF of 45% in 2013 now with symptomatic  severe systolic heart failure with LVEF of 25% with low burden pAF but high burden PACs, 8% polyfocal PVCs and nonsustained VT. He was treated with amiodarone. This was stopped in February of 2022. His LVEF recovered on metoprolol and entresto (40% 11/2021 and 4/2022 echo). He feels great. Will repeat 14 day holter. If he has recurrence of symptomatic arrhythmias would try sotalol (would need an inpatient stay for initiation).     RTC in 6 months.     Thank you for allowing me to participate in the care of this patient. Please do not hesitate to call me with any questions or concerns.    Didier Gonazlez MD, PhD  Cardiac Electrophysiology

## 2022-07-07 ENCOUNTER — TELEPHONE (OUTPATIENT)
Dept: ELECTROPHYSIOLOGY | Facility: CLINIC | Age: 62
End: 2022-07-07
Payer: COMMERCIAL

## 2022-07-07 ENCOUNTER — PATIENT MESSAGE (OUTPATIENT)
Dept: ELECTROPHYSIOLOGY | Facility: CLINIC | Age: 62
End: 2022-07-07
Payer: COMMERCIAL

## 2022-09-26 ENCOUNTER — OFFICE VISIT (OUTPATIENT)
Dept: INTERNAL MEDICINE | Facility: CLINIC | Age: 62
End: 2022-09-26
Payer: COMMERCIAL

## 2022-09-26 ENCOUNTER — IMMUNIZATION (OUTPATIENT)
Dept: INTERNAL MEDICINE | Facility: CLINIC | Age: 62
End: 2022-09-26
Payer: COMMERCIAL

## 2022-09-26 VITALS
WEIGHT: 222 LBS | SYSTOLIC BLOOD PRESSURE: 118 MMHG | OXYGEN SATURATION: 98 % | BODY MASS INDEX: 31.08 KG/M2 | DIASTOLIC BLOOD PRESSURE: 78 MMHG | HEIGHT: 71 IN | HEART RATE: 89 BPM

## 2022-09-26 DIAGNOSIS — Z23 NEED FOR COVID-19 VACCINE: ICD-10-CM

## 2022-09-26 DIAGNOSIS — Z12.5 PROSTATE CANCER SCREENING: ICD-10-CM

## 2022-09-26 DIAGNOSIS — I10 ESSENTIAL HYPERTENSION: Primary | ICD-10-CM

## 2022-09-26 DIAGNOSIS — E66.09 CLASS 1 OBESITY DUE TO EXCESS CALORIES WITH SERIOUS COMORBIDITY AND BODY MASS INDEX (BMI) OF 30.0 TO 30.9 IN ADULT: ICD-10-CM

## 2022-09-26 DIAGNOSIS — I48.0 PAROXYSMAL ATRIAL FIBRILLATION: ICD-10-CM

## 2022-09-26 DIAGNOSIS — E11.9 TYPE 2 DIABETES MELLITUS WITHOUT COMPLICATION, WITHOUT LONG-TERM CURRENT USE OF INSULIN: ICD-10-CM

## 2022-09-26 DIAGNOSIS — Z79.01 ANTICOAGULATED: ICD-10-CM

## 2022-09-26 PROCEDURE — 3061F PR NEG MICROALBUMINURIA RESULT DOCUMENTED/REVIEW: ICD-10-PCS | Mod: CPTII,S$GLB,, | Performed by: FAMILY MEDICINE

## 2022-09-26 PROCEDURE — 4010F PR ACE/ARB THEARPY RXD/TAKEN: ICD-10-PCS | Mod: CPTII,S$GLB,, | Performed by: FAMILY MEDICINE

## 2022-09-26 PROCEDURE — 99214 OFFICE O/P EST MOD 30 MIN: CPT | Mod: 25,S$GLB,, | Performed by: FAMILY MEDICINE

## 2022-09-26 PROCEDURE — 3061F NEG MICROALBUMINURIA REV: CPT | Mod: CPTII,S$GLB,, | Performed by: FAMILY MEDICINE

## 2022-09-26 PROCEDURE — 3072F PR LOW RISK FOR RETINOPATHY: ICD-10-PCS | Mod: CPTII,S$GLB,, | Performed by: FAMILY MEDICINE

## 2022-09-26 PROCEDURE — 90686 IIV4 VACC NO PRSV 0.5 ML IM: CPT | Mod: S$GLB,,, | Performed by: FAMILY MEDICINE

## 2022-09-26 PROCEDURE — 3066F PR DOCUMENTATION OF TREATMENT FOR NEPHROPATHY: ICD-10-PCS | Mod: CPTII,S$GLB,, | Performed by: FAMILY MEDICINE

## 2022-09-26 PROCEDURE — 3072F LOW RISK FOR RETINOPATHY: CPT | Mod: CPTII,S$GLB,, | Performed by: FAMILY MEDICINE

## 2022-09-26 PROCEDURE — 3078F DIAST BP <80 MM HG: CPT | Mod: CPTII,S$GLB,, | Performed by: FAMILY MEDICINE

## 2022-09-26 PROCEDURE — 3066F NEPHROPATHY DOC TX: CPT | Mod: CPTII,S$GLB,, | Performed by: FAMILY MEDICINE

## 2022-09-26 PROCEDURE — 1159F PR MEDICATION LIST DOCUMENTED IN MEDICAL RECORD: ICD-10-PCS | Mod: CPTII,S$GLB,, | Performed by: FAMILY MEDICINE

## 2022-09-26 PROCEDURE — 99214 PR OFFICE/OUTPT VISIT, EST, LEVL IV, 30-39 MIN: ICD-10-PCS | Mod: 25,S$GLB,, | Performed by: FAMILY MEDICINE

## 2022-09-26 PROCEDURE — 3051F HG A1C>EQUAL 7.0%<8.0%: CPT | Mod: CPTII,S$GLB,, | Performed by: FAMILY MEDICINE

## 2022-09-26 PROCEDURE — 90471 IMMUNIZATION ADMIN: CPT | Mod: S$GLB,,, | Performed by: FAMILY MEDICINE

## 2022-09-26 PROCEDURE — 99999 PR PBB SHADOW E&M-EST. PATIENT-LVL IV: CPT | Mod: PBBFAC,,, | Performed by: FAMILY MEDICINE

## 2022-09-26 PROCEDURE — 1159F MED LIST DOCD IN RCRD: CPT | Mod: CPTII,S$GLB,, | Performed by: FAMILY MEDICINE

## 2022-09-26 PROCEDURE — 3008F BODY MASS INDEX DOCD: CPT | Mod: CPTII,S$GLB,, | Performed by: FAMILY MEDICINE

## 2022-09-26 PROCEDURE — 3051F PR MOST RECENT HEMOGLOBIN A1C LEVEL 7.0 - < 8.0%: ICD-10-PCS | Mod: CPTII,S$GLB,, | Performed by: FAMILY MEDICINE

## 2022-09-26 PROCEDURE — 99999 PR PBB SHADOW E&M-EST. PATIENT-LVL IV: ICD-10-PCS | Mod: PBBFAC,,, | Performed by: FAMILY MEDICINE

## 2022-09-26 PROCEDURE — 4010F ACE/ARB THERAPY RXD/TAKEN: CPT | Mod: CPTII,S$GLB,, | Performed by: FAMILY MEDICINE

## 2022-09-26 PROCEDURE — 90686 FLU VACCINE (QUAD) GREATER THAN OR EQUAL TO 3YO PRESERVATIVE FREE IM: ICD-10-PCS | Mod: S$GLB,,, | Performed by: FAMILY MEDICINE

## 2022-09-26 PROCEDURE — 3008F PR BODY MASS INDEX (BMI) DOCUMENTED: ICD-10-PCS | Mod: CPTII,S$GLB,, | Performed by: FAMILY MEDICINE

## 2022-09-26 PROCEDURE — 3074F SYST BP LT 130 MM HG: CPT | Mod: CPTII,S$GLB,, | Performed by: FAMILY MEDICINE

## 2022-09-26 PROCEDURE — 90471 FLU VACCINE (QUAD) GREATER THAN OR EQUAL TO 3YO PRESERVATIVE FREE IM: ICD-10-PCS | Mod: S$GLB,,, | Performed by: FAMILY MEDICINE

## 2022-09-26 PROCEDURE — 3078F PR MOST RECENT DIASTOLIC BLOOD PRESSURE < 80 MM HG: ICD-10-PCS | Mod: CPTII,S$GLB,, | Performed by: FAMILY MEDICINE

## 2022-09-26 PROCEDURE — 3074F PR MOST RECENT SYSTOLIC BLOOD PRESSURE < 130 MM HG: ICD-10-PCS | Mod: CPTII,S$GLB,, | Performed by: FAMILY MEDICINE

## 2022-09-26 NOTE — PATIENT INSTRUCTIONS
Thank you for your interest in the [facility] Multi Cancer Early Detection test. We are very excited about this clinical trial, the future of early cancer detection, and the overwhelming interest we have received. We have not yet started enrollment for this trial, but are ramping up to do so within the coming month.     Please email [email or web address] for more information and to be added to the list of interested patients.     [facility] is offering this same test for a fee. Please email ([email or web address] if you are interested in purchasing.     Thank you for your interest as [facility] strives to improve the health outcomes of our community.

## 2022-09-26 NOTE — PROGRESS NOTES
Subjective:       Patient ID: Michael Slaughter Jr. is a 62 y.o. male.    Chief Complaint: Follow-up    Patient is here for follow-up of their chronic diseases    Diabetes Management Status  Lab Results   Component Value Date    HGBA1C 7.0 (H) 03/23/2022    HGBA1C 7.3 (H) 10/28/2021    HGBA1C 7.8 (H) 09/21/2021       Statin: Taking  ACE/ARB: Not taking    Screening or Prevention Patient's value Goal Complete/Controlled?   HgA1C Testing and Control   Lab Results   Component Value Date    HGBA1C 7.0 (H) 03/23/2022      Annually/Less than 8% Yes     Lipid profile : 09/21/2021 Annually No     LDL control Lab Results   Component Value Date    LDLCALC 48.8 (L) 09/21/2021    Annually/Less than 100 mg/dl  No     Nephropathy screening Lab Results   Component Value Date    LABMICR 8.0 03/23/2022     Lab Results   Component Value Date    PROTEINUA Negative 09/17/2019     Lab Results   Component Value Date    UTPCR 0.11 09/21/2021      Annually Yes     Blood pressure BP Readings from Last 1 Encounters:   09/26/22 118/78    Less than 140/90 Yes     Dilated retinal exam : 06/23/2021 Annually No     Foot exam   : 04/21/2021 Annually No           Review of patient's allergies indicates:   Allergen Reactions    Lisinopril      Other reaction(s): cough       Social History     Tobacco Use    Smoking status: Former     Types: Cigarettes    Smokeless tobacco: Never   Substance Use Topics    Alcohol use: Yes     Comment: daily    Drug use: No       Family History   Problem Relation Age of Onset    Cancer Mother         breast    Birth defects Maternal Grandmother         brain tumor    Heart disease Neg Hx     Stroke Neg Hx     Amblyopia Neg Hx     Blindness Neg Hx     Cataracts Neg Hx     Glaucoma Neg Hx     Hypertension Neg Hx     Macular degeneration Neg Hx     Retinal detachment Neg Hx     Strabismus Neg Hx        No past surgical history on file.    Patient Active Problem List   Diagnosis    Essential hypertension    Type 2 diabetes  mellitus without complication, without long-term current use of insulin    GERD (gastroesophageal reflux disease)    Sinusitis    ED (erectile dysfunction)    Colon cancer screening    Paroxysmal atrial fibrillation    Chronic combined systolic and diastolic heart failure    Anticoagulated    Frequent PVCs    Nonsustained ventricular tachycardia    Class 1 obesity due to excess calories with serious comorbidity and body mass index (BMI) of 30.0 to 30.9 in adult       Current Outpatient Medications on File Prior to Visit   Medication Sig Dispense Refill    atorvastatin (LIPITOR) 10 MG tablet TAKE 1 TABLET (10 MG TOTAL) BY MOUTH ONCE DAILY. 30 tablet 3    blood sugar diagnostic Strp 1 strip by Misc.(Non-Drug; Combo Route) route 5 (five) times daily. Whatever insurance allows 400 each 12    blood-glucose meter kit Use as instructed 1 each 0    ELIQUIS 5 mg Tab TAKE 1 TABLET BY MOUTH TWICE A DAY 60 tablet 10    ENTRESTO  mg per tablet TAKE 1 TABLET BY MOUTH TWICE A DAY 60 tablet 11    metFORMIN (GLUCOPHAGE) 1000 MG tablet TAKE 1 TABLET BY MOUTH TWICE A DAY WITH MEALS 180 tablet 1    metoprolol succinate (TOPROL-XL) 50 MG 24 hr tablet TAKE 1 TABLET (50 MG TOTAL) BY MOUTH ONCE DAILY. 30 tablet 3    ONETOUCH DELICA LANCETS 30 gauge Misc       semaglutide (OZEMPIC) 1 mg/dose (2 mg/1.5 mL) PnIj Inject 1 mg into the skin every 7 days. 2 pen 11    spironolactone (ALDACTONE) 25 MG tablet Take 1 tablet (25 mg total) by mouth once daily. 30 tablet 11    furosemide (LASIX) 40 MG tablet Take 1 tablet (40 mg total) by mouth daily as needed. 90 tablet 3     No current facility-administered medications on file prior to visit.           Review of Systems   Constitutional:  Negative for chills and fever.   HENT:  Negative for ear pain.    Eyes:  Negative for pain.   Respiratory:  Negative for chest tightness.    Cardiovascular:  Negative for chest pain.   Gastrointestinal:  Negative for abdominal pain.   Genitourinary:  Negative  "for flank pain.   Musculoskeletal:  Negative for gait problem.   Neurological:  Negative for syncope.   Psychiatric/Behavioral:  Negative for behavioral problems.      Objective:    /78 (BP Location: Left arm, Patient Position: Sitting, BP Method: Large (Manual))   Pulse 89   Ht 5' 11" (1.803 m)   Wt 100.7 kg (222 lb)   SpO2 98%   BMI 30.96 kg/m²     Physical Exam  Constitutional:       Appearance: He is well-developed.   HENT:      Head: Normocephalic and atraumatic.   Cardiovascular:      Rate and Rhythm: Normal rate.      Heart sounds: Normal heart sounds.   Pulmonary:      Effort: No respiratory distress.      Breath sounds: Normal breath sounds. No wheezing or rales.   Abdominal:      Palpations: Abdomen is soft.   Musculoskeletal:      Cervical back: Neck supple.      Right foot: Normal range of motion. No deformity, bunion, Charcot foot, foot drop or prominent metatarsal heads.      Left foot: Normal range of motion. No deformity, bunion, Charcot foot, foot drop or prominent metatarsal heads.   Feet:      Right foot:      Protective Sensation: 5 sites tested.  5 sites sensed.      Skin integrity: Skin integrity normal.      Left foot:      Protective Sensation: 5 sites tested.  5 sites sensed.      Skin integrity: Skin integrity normal.   Skin:     General: Skin is dry.   Neurological:      Mental Status: He is alert.   Psychiatric:         Behavior: Behavior normal.       Assessment:       1. Essential hypertension    2. Class 1 obesity due to excess calories with serious comorbidity and body mass index (BMI) of 30.0 to 30.9 in adult    3. Type 2 diabetes mellitus without complication, without long-term current use of insulin    4. Paroxysmal atrial fibrillation    5. Anticoagulated    6. Prostate cancer screening    7. Need for COVID-19 vaccine        Plan:       Michael was seen today for follow-up.    Diagnoses and all orders for this visit:    Essential hypertension  -controlled, stable, no " change in meds   -controlled, stable, no change in meds   -     Comprehensive Metabolic Panel; Future  -     Lipid Panel; Future  -     TSH; Future    Class 1 obesity due to excess calories with serious comorbidity and body mass index (BMI) of 30.0 to 30.9 in adult  -     Hemoglobin A1C; Future  -discussed diet and exercise    Type 2 diabetes mellitus without complication, without long-term current use of insulin  -monitoring today   -     Comprehensive Metabolic Panel; Future  -     Hemoglobin A1C; Future  -     Lipid Panel; Future  -     TSH; Future  -     Ambulatory referral/consult to Optometry; Future    Paroxysmal atrial fibrillation  Anticoagulated  -stable    Prostate cancer screening  -     PSA, Screening; Future    Covid booster and flu shot today    Follow up in about 6 months (around 3/26/2023) for htn, dm.

## 2022-09-30 ENCOUNTER — PATIENT MESSAGE (OUTPATIENT)
Dept: INTERNAL MEDICINE | Facility: CLINIC | Age: 62
End: 2022-09-30
Payer: COMMERCIAL

## 2022-09-30 DIAGNOSIS — K64.9 HEMORRHOIDS, UNSPECIFIED HEMORRHOID TYPE: Primary | ICD-10-CM

## 2022-09-30 DIAGNOSIS — K62.5 RECTAL BLEEDING: ICD-10-CM

## 2022-10-03 ENCOUNTER — OFFICE VISIT (OUTPATIENT)
Dept: SURGERY | Facility: CLINIC | Age: 62
End: 2022-10-03
Payer: COMMERCIAL

## 2022-10-03 ENCOUNTER — IMMUNIZATION (OUTPATIENT)
Dept: INTERNAL MEDICINE | Facility: CLINIC | Age: 62
End: 2022-10-03
Payer: COMMERCIAL

## 2022-10-03 ENCOUNTER — LAB VISIT (OUTPATIENT)
Dept: LAB | Facility: HOSPITAL | Age: 62
End: 2022-10-03
Attending: FAMILY MEDICINE
Payer: COMMERCIAL

## 2022-10-03 VITALS
HEART RATE: 75 BPM | WEIGHT: 228.81 LBS | SYSTOLIC BLOOD PRESSURE: 131 MMHG | HEIGHT: 71 IN | BODY MASS INDEX: 32.03 KG/M2 | DIASTOLIC BLOOD PRESSURE: 75 MMHG

## 2022-10-03 DIAGNOSIS — Z23 NEED FOR VACCINATION: Primary | ICD-10-CM

## 2022-10-03 DIAGNOSIS — K64.5 THROMBOSED HEMORRHOIDS: Primary | ICD-10-CM

## 2022-10-03 DIAGNOSIS — E66.09 CLASS 1 OBESITY DUE TO EXCESS CALORIES WITH SERIOUS COMORBIDITY AND BODY MASS INDEX (BMI) OF 30.0 TO 30.9 IN ADULT: ICD-10-CM

## 2022-10-03 DIAGNOSIS — E11.9 TYPE 2 DIABETES MELLITUS WITHOUT COMPLICATION, WITHOUT LONG-TERM CURRENT USE OF INSULIN: ICD-10-CM

## 2022-10-03 DIAGNOSIS — Z12.5 PROSTATE CANCER SCREENING: ICD-10-CM

## 2022-10-03 DIAGNOSIS — K62.5 RECTAL BLEEDING: ICD-10-CM

## 2022-10-03 DIAGNOSIS — I10 ESSENTIAL HYPERTENSION: ICD-10-CM

## 2022-10-03 LAB
ALBUMIN SERPL BCP-MCNC: 4 G/DL (ref 3.5–5.2)
ALP SERPL-CCNC: 45 U/L (ref 55–135)
ALT SERPL W/O P-5'-P-CCNC: 23 U/L (ref 10–44)
ANION GAP SERPL CALC-SCNC: 12 MMOL/L (ref 8–16)
AST SERPL-CCNC: 12 U/L (ref 10–40)
BILIRUB SERPL-MCNC: 0.5 MG/DL (ref 0.1–1)
BUN SERPL-MCNC: 16 MG/DL (ref 8–23)
CALCIUM SERPL-MCNC: 9.8 MG/DL (ref 8.7–10.5)
CHLORIDE SERPL-SCNC: 106 MMOL/L (ref 95–110)
CHOLEST SERPL-MCNC: 138 MG/DL (ref 120–199)
CHOLEST/HDLC SERPL: 3.4 {RATIO} (ref 2–5)
CO2 SERPL-SCNC: 23 MMOL/L (ref 23–29)
COMPLEXED PSA SERPL-MCNC: 0.62 NG/ML (ref 0–4)
CREAT SERPL-MCNC: 1.1 MG/DL (ref 0.5–1.4)
EST. GFR  (NO RACE VARIABLE): >60 ML/MIN/1.73 M^2
ESTIMATED AVG GLUCOSE: 166 MG/DL (ref 68–131)
GLUCOSE SERPL-MCNC: 135 MG/DL (ref 70–110)
HBA1C MFR BLD: 7.4 % (ref 4–5.6)
HDLC SERPL-MCNC: 41 MG/DL (ref 40–75)
HDLC SERPL: 29.7 % (ref 20–50)
LDLC SERPL CALC-MCNC: 73.8 MG/DL (ref 63–159)
NONHDLC SERPL-MCNC: 97 MG/DL
POTASSIUM SERPL-SCNC: 4.5 MMOL/L (ref 3.5–5.1)
PROT SERPL-MCNC: 6.9 G/DL (ref 6–8.4)
SODIUM SERPL-SCNC: 141 MMOL/L (ref 136–145)
TRIGL SERPL-MCNC: 116 MG/DL (ref 30–150)
TSH SERPL DL<=0.005 MIU/L-ACNC: 1.48 UIU/ML (ref 0.4–4)

## 2022-10-03 PROCEDURE — 1160F RVW MEDS BY RX/DR IN RCRD: CPT | Mod: CPTII,S$GLB,, | Performed by: NURSE PRACTITIONER

## 2022-10-03 PROCEDURE — 3072F LOW RISK FOR RETINOPATHY: CPT | Mod: CPTII,S$GLB,, | Performed by: NURSE PRACTITIONER

## 2022-10-03 PROCEDURE — 3075F SYST BP GE 130 - 139MM HG: CPT | Mod: CPTII,S$GLB,, | Performed by: NURSE PRACTITIONER

## 2022-10-03 PROCEDURE — 3008F BODY MASS INDEX DOCD: CPT | Mod: CPTII,S$GLB,, | Performed by: NURSE PRACTITIONER

## 2022-10-03 PROCEDURE — 3061F PR NEG MICROALBUMINURIA RESULT DOCUMENTED/REVIEW: ICD-10-PCS | Mod: CPTII,S$GLB,, | Performed by: NURSE PRACTITIONER

## 2022-10-03 PROCEDURE — 91312 COVID-19, MRNA, LNP-S, BIVALENT BOOSTER, PF, 30 MCG/0.3 ML DOSE: ICD-10-PCS | Mod: S$GLB,,, | Performed by: INTERNAL MEDICINE

## 2022-10-03 PROCEDURE — 1160F PR REVIEW ALL MEDS BY PRESCRIBER/CLIN PHARMACIST DOCUMENTED: ICD-10-PCS | Mod: CPTII,S$GLB,, | Performed by: NURSE PRACTITIONER

## 2022-10-03 PROCEDURE — 99999 PR PBB SHADOW E&M-EST. PATIENT-LVL IV: CPT | Mod: PBBFAC,,, | Performed by: NURSE PRACTITIONER

## 2022-10-03 PROCEDURE — 99999 PR PBB SHADOW E&M-EST. PATIENT-LVL IV: ICD-10-PCS | Mod: PBBFAC,,, | Performed by: NURSE PRACTITIONER

## 2022-10-03 PROCEDURE — 3051F PR MOST RECENT HEMOGLOBIN A1C LEVEL 7.0 - < 8.0%: ICD-10-PCS | Mod: CPTII,S$GLB,, | Performed by: NURSE PRACTITIONER

## 2022-10-03 PROCEDURE — 1159F MED LIST DOCD IN RCRD: CPT | Mod: CPTII,S$GLB,, | Performed by: NURSE PRACTITIONER

## 2022-10-03 PROCEDURE — 84153 ASSAY OF PSA TOTAL: CPT | Performed by: FAMILY MEDICINE

## 2022-10-03 PROCEDURE — 99203 PR OFFICE/OUTPT VISIT, NEW, LEVL III, 30-44 MIN: ICD-10-PCS | Mod: S$GLB,,, | Performed by: NURSE PRACTITIONER

## 2022-10-03 PROCEDURE — 3061F NEG MICROALBUMINURIA REV: CPT | Mod: CPTII,S$GLB,, | Performed by: NURSE PRACTITIONER

## 2022-10-03 PROCEDURE — 3066F NEPHROPATHY DOC TX: CPT | Mod: CPTII,S$GLB,, | Performed by: NURSE PRACTITIONER

## 2022-10-03 PROCEDURE — 36415 COLL VENOUS BLD VENIPUNCTURE: CPT | Performed by: FAMILY MEDICINE

## 2022-10-03 PROCEDURE — 0124A COVID-19, MRNA, LNP-S, BIVALENT BOOSTER, PF, 30 MCG/0.3 ML DOSE: CPT | Mod: CV19,PBBFAC | Performed by: INTERNAL MEDICINE

## 2022-10-03 PROCEDURE — 80061 LIPID PANEL: CPT | Performed by: FAMILY MEDICINE

## 2022-10-03 PROCEDURE — 84443 ASSAY THYROID STIM HORMONE: CPT | Performed by: FAMILY MEDICINE

## 2022-10-03 PROCEDURE — 0124A PR IMMUNIZ ADMIN, SARS-COV- 2 COVID-19 VACCINE, 30MCG/0.3ML, BIVALENT, BOOSTER DOSE: ICD-10-PCS | Mod: S$GLB,,, | Performed by: INTERNAL MEDICINE

## 2022-10-03 PROCEDURE — 3051F HG A1C>EQUAL 7.0%<8.0%: CPT | Mod: CPTII,S$GLB,, | Performed by: NURSE PRACTITIONER

## 2022-10-03 PROCEDURE — 3066F PR DOCUMENTATION OF TREATMENT FOR NEPHROPATHY: ICD-10-PCS | Mod: CPTII,S$GLB,, | Performed by: NURSE PRACTITIONER

## 2022-10-03 PROCEDURE — 3008F PR BODY MASS INDEX (BMI) DOCUMENTED: ICD-10-PCS | Mod: CPTII,S$GLB,, | Performed by: NURSE PRACTITIONER

## 2022-10-03 PROCEDURE — 3072F PR LOW RISK FOR RETINOPATHY: ICD-10-PCS | Mod: CPTII,S$GLB,, | Performed by: NURSE PRACTITIONER

## 2022-10-03 PROCEDURE — 4010F ACE/ARB THERAPY RXD/TAKEN: CPT | Mod: CPTII,S$GLB,, | Performed by: NURSE PRACTITIONER

## 2022-10-03 PROCEDURE — 3075F PR MOST RECENT SYSTOLIC BLOOD PRESS GE 130-139MM HG: ICD-10-PCS | Mod: CPTII,S$GLB,, | Performed by: NURSE PRACTITIONER

## 2022-10-03 PROCEDURE — 80053 COMPREHEN METABOLIC PANEL: CPT | Performed by: FAMILY MEDICINE

## 2022-10-03 PROCEDURE — 1159F PR MEDICATION LIST DOCUMENTED IN MEDICAL RECORD: ICD-10-PCS | Mod: CPTII,S$GLB,, | Performed by: NURSE PRACTITIONER

## 2022-10-03 PROCEDURE — 91312 COVID-19, MRNA, LNP-S, BIVALENT BOOSTER, PF, 30 MCG/0.3 ML DOSE: CPT | Mod: S$GLB,,, | Performed by: INTERNAL MEDICINE

## 2022-10-03 PROCEDURE — 3078F PR MOST RECENT DIASTOLIC BLOOD PRESSURE < 80 MM HG: ICD-10-PCS | Mod: CPTII,S$GLB,, | Performed by: NURSE PRACTITIONER

## 2022-10-03 PROCEDURE — 99203 OFFICE O/P NEW LOW 30 MIN: CPT | Mod: S$GLB,,, | Performed by: NURSE PRACTITIONER

## 2022-10-03 PROCEDURE — 4010F PR ACE/ARB THEARPY RXD/TAKEN: ICD-10-PCS | Mod: CPTII,S$GLB,, | Performed by: NURSE PRACTITIONER

## 2022-10-03 PROCEDURE — 83036 HEMOGLOBIN GLYCOSYLATED A1C: CPT | Performed by: FAMILY MEDICINE

## 2022-10-03 PROCEDURE — 0124A PR IMMUNIZ ADMIN, SARS-COV- 2 COVID-19 VACCINE, 30MCG/0.3ML, BIVALENT, BOOSTER DOSE: CPT | Mod: S$GLB,,, | Performed by: INTERNAL MEDICINE

## 2022-10-03 PROCEDURE — 3078F DIAST BP <80 MM HG: CPT | Mod: CPTII,S$GLB,, | Performed by: NURSE PRACTITIONER

## 2022-10-03 RX ORDER — HYDROCORTISONE 25 MG/G
CREAM TOPICAL 2 TIMES DAILY
Qty: 28 G | Refills: 2 | Status: SHIPPED | OUTPATIENT
Start: 2022-10-03 | End: 2022-12-20

## 2022-10-03 NOTE — PROGRESS NOTES
CRS Office Visit History and Physical    Referring Md:   iMchael Deal Md  [address] [place] [address]    SUBJECTIVE:     Chief Complaint: rectal bleeding    History of Present Illness:  The patient is new patient to this practice.   Course is as follows:  Patient is a 62 y.o. male presents with rectal bleeding/hemorrhoids.  Symptoms have been present for 2 days 2 weeks ago. Has now stopped. Went to have a bm at work, got stuck on elevator for a while, when able to finally go was loose stools.  +itching, pain to touch, followed by 2 days of BRBPR with bm.   Has tried prep h with some soothing relief.  Associated bleeding: yes  Previous anorectal procedures: no  confirms straining/prolonged time on toilet with bowel movements.  is not currently taking fiber supplement or stool softener. Bm QOD, loose vs formed.   Blood thinners: Yes, eliquis    Last Colonoscopy completed on 7/18/2014  - normal  -repeat in 10 years  Family history of colorectal cancer or IBD: none.    Review of patient's allergies indicates:   Allergen Reactions    Lisinopril      Other reaction(s): cough       Past Medical History:   Diagnosis Date    Diabetes mellitus type II     GERD (gastroesophageal reflux disease)     Hypertension     Sinusitis      History reviewed. No pertinent surgical history.  Family History   Problem Relation Age of Onset    Cancer Mother         breast    Birth defects Maternal Grandmother         brain tumor    Heart disease Neg Hx     Stroke Neg Hx     Amblyopia Neg Hx     Blindness Neg Hx     Cataracts Neg Hx     Glaucoma Neg Hx     Hypertension Neg Hx     Macular degeneration Neg Hx     Retinal detachment Neg Hx     Strabismus Neg Hx      Social History     Tobacco Use    Smoking status: Former     Types: Cigarettes    Smokeless tobacco: Never   Substance Use Topics    Alcohol use: Yes     Comment: daily    Drug use: No        Review of Systems:  Review of Systems   Constitutional:  Negative for  "weight loss.   Gastrointestinal:  Positive for blood in stool and diarrhea. Negative for abdominal pain and melena.     OBJECTIVE:     Vital Signs (Most Recent)  Blood Pressure 131/75 (BP Location: Right arm, Patient Position: Sitting, BP Method: Large (Automatic))   Pulse 75   Height 5' 11" (1.803 m)   Weight 103.8 kg (228 lb 13.4 oz)   Body Mass Index 31.92 kg/m²     Physical Exam:  General: Black or  male in no distress   Neuro: Alert and oriented to person, place, and time.  Moves all extremities.     HEENT: No icterus.  Trachea midline  Respiratory: Respirations are even and unlabored, no cough or audible wheezing  Skin: Warm dry and intact, No visible rashes, no jaundice    Labs reviewed today:  Lab Results   Component Value Date    WBC 9.90 03/23/2022    HGB 14.5 03/23/2022    HCT 44.4 03/23/2022     03/23/2022    CHOL 116 (L) 09/21/2021    TRIG 121 09/21/2021    HDL 43 09/21/2021    ALT 19 09/21/2021    AST 10 09/21/2021     03/23/2022    K 4.6 03/23/2022     03/23/2022    CREATININE 1.2 03/23/2022    BUN 15 03/23/2022    CO2 27 03/23/2022    TSH 1.638 09/21/2021    PSA 0.46 09/21/2021    HGBA1C 7.0 (H) 03/23/2022       Imaging reviewed today:  none    Endoscopy reviewed today:  Last Colonoscopy completed on 7/18/2014  - normal  -repeat in 10 years    Anorectal Exam:    Anal Skin: Normal  - left lateral resolving thrombosed hemorrhoid noted with eversion of anus    Digital Rectal Exam:  Resting Tone normal  Squeeze normal  Relaxation with bear down present  Mass left lateral thrombosed hemorrhoid noted  Rectocele  absent  Tenderness  absent    Anoscopy:  Deferred      ASSESSMENT/PLAN:     Diagnoses and all orders for this visit:    Thrombosed hemorrhoids  -     hydrocortisone (ANUSOL-HC) 2.5 % rectal cream; Place rectally 2 (two) times daily.    Rectal bleeding  -     Ambulatory referral/consult to Colorectal Surgery      The patient was instructed to:  Anusol bid for " up to 2 weeks  Increase water intake to at least 8-10 glasses of water per day.  Take a daily fiber supplement (Konsyl, Benefiber, Metamucil) and increase dietary intake to 20-30 grams/day.  Avoid straining or spending >5min/event with bowel movements.  Follow-up in clinic prn if no improvement.   Colonoscopy in 2 years      JULIA Corey  Colon and Rectal Surgery

## 2022-10-03 NOTE — PATIENT INSTRUCTIONS
Anusol cream 2x/day for up to 2 weeks  Daily fiber supplement like citrucel, fibercon, metamucil  No sitting for more than 5 mins

## 2022-10-31 NOTE — TELEPHONE ENCOUNTER
No new care gaps identified.  [facility] Embedded Care Gaps. Reference number: [id number]. 10/31/2022   12:32:08 PM CDT

## 2022-11-01 RX ORDER — METFORMIN HYDROCHLORIDE 1000 MG/1
TABLET ORAL
Qty: 180 TABLET | Refills: 1 | Status: SHIPPED | OUTPATIENT
Start: 2022-11-01 | End: 2023-04-24

## 2022-11-01 NOTE — TELEPHONE ENCOUNTER
PHYSICAL THERAPY QUICK EVALUATION - INPATIENT    Room Number: 523/523-A  Evaluation Date: 9/11/2020  Presenting Problem: osteomyelitis RLE  Physician Order: PT Eval and Treat    Problem List  Principal Problem:    Cellulitis of right lower extremity  Act Refill Decision Note   Michael Slaughter  is requesting a refill authorization.  Brief Assessment and Rationale for Refill:  Approve     Medication Therapy Plan:       Medication Reconciliation Completed: No   Comments:     No Care Gaps recommended.     Note composed:2:06 PM 11/01/2022           REDUCTION INTERNAL FIXATION Right 1/11/2018    Performed by Alexander Randolph DPM at [facility] MAIN OR   • HAND MASS EXCISION Left 5/14/2019    Performed by Coy Fried MD at [facility] MAIN OR   • Wal[address] [place] [address] Right 10/4/2017    Performed by Kaylen Gómez bedside commode, etc.): None   -   Moving from lying on back to sitting on the side of the bed?: None   How much help from another person does the patient currently need. ..   -   Moving to and from a bed to a chair (including a wheelchair)?: None   -   Kye Dhillon

## 2022-12-07 DIAGNOSIS — I48.0 PAROXYSMAL ATRIAL FIBRILLATION: Primary | ICD-10-CM

## 2022-12-16 NOTE — PATIENT INSTRUCTIONS
Goal is fasting blood sugar in am 110 or less.  Blood sugar 2 HOURS AFTER A MEAL 140 or less  All diabetics should take the following unless allergic:  Baby aspirin 81 mg one daily, available over the counter, to help prevent a stroke or heart attack  A medication called an ACE inhibitor or an ARB to protect the kidneys ( Lisinopril, Losartan)  A statin ( Zocor, Mevacor, Crestor, Lipitor) even if your cholesterol is normal to help prevent a stroke or heart attack  Flu shot every year  Eye exam ever year  Dental exam every 6 months    You should be checking your blood sugar every morning before you eat or drink and then once during the day 2 HOURS AFTER A MEAL, it does not matter which meal.    Schedule with Optometry for your eye exam    Schedule with Podiatry for your foot exam    Schedule your stress echo.  I will call you with your results.    Follow up in 3 months.  Labs a few a days  prior to your appointment, I have already ordered them      Increase your Amaryl to 4 mg a day, you have 1 mg tabs at home take 4 a day in the  Morning to make 4 mg so you can use up what you have at home, I sent a new script to [facility] for the 4 mg tablets.  When you  the new tablets you will only take 1 in the  morning.    Continue to take your Metformin twice a day with meals      
(3) no apparent problem

## 2022-12-19 ENCOUNTER — TELEPHONE (OUTPATIENT)
Dept: ELECTROPHYSIOLOGY | Facility: CLINIC | Age: 62
End: 2022-12-19
Payer: COMMERCIAL

## 2022-12-20 ENCOUNTER — TELEPHONE (OUTPATIENT)
Dept: INTERNAL MEDICINE | Facility: CLINIC | Age: 62
End: 2022-12-20
Payer: COMMERCIAL

## 2022-12-20 ENCOUNTER — OFFICE VISIT (OUTPATIENT)
Dept: ELECTROPHYSIOLOGY | Facility: CLINIC | Age: 62
End: 2022-12-20
Payer: COMMERCIAL

## 2022-12-20 ENCOUNTER — HOSPITAL ENCOUNTER (OUTPATIENT)
Dept: CARDIOLOGY | Facility: CLINIC | Age: 62
Discharge: HOME | End: 2022-12-20
Payer: COMMERCIAL

## 2022-12-20 VITALS
SYSTOLIC BLOOD PRESSURE: 118 MMHG | WEIGHT: 226.19 LBS | DIASTOLIC BLOOD PRESSURE: 78 MMHG | BODY MASS INDEX: 31.67 KG/M2 | HEART RATE: 80 BPM | HEIGHT: 71 IN

## 2022-12-20 DIAGNOSIS — I10 ESSENTIAL HYPERTENSION: Primary | ICD-10-CM

## 2022-12-20 DIAGNOSIS — I10 ESSENTIAL HYPERTENSION: ICD-10-CM

## 2022-12-20 DIAGNOSIS — E11.9 TYPE 2 DIABETES MELLITUS WITHOUT COMPLICATION, WITHOUT LONG-TERM CURRENT USE OF INSULIN: ICD-10-CM

## 2022-12-20 DIAGNOSIS — I48.0 PAROXYSMAL ATRIAL FIBRILLATION: ICD-10-CM

## 2022-12-20 DIAGNOSIS — I50.42 CHRONIC COMBINED SYSTOLIC AND DIASTOLIC HEART FAILURE: ICD-10-CM

## 2022-12-20 DIAGNOSIS — I49.3 FREQUENT PVCS: ICD-10-CM

## 2022-12-20 DIAGNOSIS — I48.0 PAROXYSMAL ATRIAL FIBRILLATION: Primary | ICD-10-CM

## 2022-12-20 DIAGNOSIS — I47.29 NONSUSTAINED VENTRICULAR TACHYCARDIA: ICD-10-CM

## 2022-12-20 PROCEDURE — 3051F HG A1C>EQUAL 7.0%<8.0%: CPT | Mod: CPTII,S$GLB,, | Performed by: INTERNAL MEDICINE

## 2022-12-20 PROCEDURE — 1160F RVW MEDS BY RX/DR IN RCRD: CPT | Mod: CPTII,S$GLB,, | Performed by: INTERNAL MEDICINE

## 2022-12-20 PROCEDURE — 3078F PR MOST RECENT DIASTOLIC BLOOD PRESSURE < 80 MM HG: ICD-10-PCS | Mod: CPTII,S$GLB,, | Performed by: INTERNAL MEDICINE

## 2022-12-20 PROCEDURE — 93010 RHYTHM STRIP: ICD-10-PCS | Mod: S$GLB,,, | Performed by: INTERNAL MEDICINE

## 2022-12-20 PROCEDURE — 3061F PR NEG MICROALBUMINURIA RESULT DOCUMENTED/REVIEW: ICD-10-PCS | Mod: CPTII,S$GLB,, | Performed by: INTERNAL MEDICINE

## 2022-12-20 PROCEDURE — 3066F PR DOCUMENTATION OF TREATMENT FOR NEPHROPATHY: ICD-10-PCS | Mod: CPTII,S$GLB,, | Performed by: INTERNAL MEDICINE

## 2022-12-20 PROCEDURE — 93010 ELECTROCARDIOGRAM REPORT: CPT | Mod: S$GLB,,, | Performed by: INTERNAL MEDICINE

## 2022-12-20 PROCEDURE — 4010F ACE/ARB THERAPY RXD/TAKEN: CPT | Mod: CPTII,S$GLB,, | Performed by: INTERNAL MEDICINE

## 2022-12-20 PROCEDURE — 99214 PR OFFICE/OUTPT VISIT, EST, LEVL IV, 30-39 MIN: ICD-10-PCS | Mod: S$GLB,,, | Performed by: INTERNAL MEDICINE

## 2022-12-20 PROCEDURE — 3066F NEPHROPATHY DOC TX: CPT | Mod: CPTII,S$GLB,, | Performed by: INTERNAL MEDICINE

## 2022-12-20 PROCEDURE — 4010F PR ACE/ARB THEARPY RXD/TAKEN: ICD-10-PCS | Mod: CPTII,S$GLB,, | Performed by: INTERNAL MEDICINE

## 2022-12-20 PROCEDURE — 99999 PR PBB SHADOW E&M-EST. PATIENT-LVL IV: CPT | Mod: PBBFAC,,, | Performed by: INTERNAL MEDICINE

## 2022-12-20 PROCEDURE — 3074F SYST BP LT 130 MM HG: CPT | Mod: CPTII,S$GLB,, | Performed by: INTERNAL MEDICINE

## 2022-12-20 PROCEDURE — 3074F PR MOST RECENT SYSTOLIC BLOOD PRESSURE < 130 MM HG: ICD-10-PCS | Mod: CPTII,S$GLB,, | Performed by: INTERNAL MEDICINE

## 2022-12-20 PROCEDURE — 3072F PR LOW RISK FOR RETINOPATHY: ICD-10-PCS | Mod: CPTII,S$GLB,, | Performed by: INTERNAL MEDICINE

## 2022-12-20 PROCEDURE — 3051F PR MOST RECENT HEMOGLOBIN A1C LEVEL 7.0 - < 8.0%: ICD-10-PCS | Mod: CPTII,S$GLB,, | Performed by: INTERNAL MEDICINE

## 2022-12-20 PROCEDURE — 3061F NEG MICROALBUMINURIA REV: CPT | Mod: CPTII,S$GLB,, | Performed by: INTERNAL MEDICINE

## 2022-12-20 PROCEDURE — 1159F PR MEDICATION LIST DOCUMENTED IN MEDICAL RECORD: ICD-10-PCS | Mod: CPTII,S$GLB,, | Performed by: INTERNAL MEDICINE

## 2022-12-20 PROCEDURE — 3008F PR BODY MASS INDEX (BMI) DOCUMENTED: ICD-10-PCS | Mod: CPTII,S$GLB,, | Performed by: INTERNAL MEDICINE

## 2022-12-20 PROCEDURE — 1159F MED LIST DOCD IN RCRD: CPT | Mod: CPTII,S$GLB,, | Performed by: INTERNAL MEDICINE

## 2022-12-20 PROCEDURE — 93005 RHYTHM STRIP: ICD-10-PCS | Mod: S$GLB,,, | Performed by: INTERNAL MEDICINE

## 2022-12-20 PROCEDURE — 3078F DIAST BP <80 MM HG: CPT | Mod: CPTII,S$GLB,, | Performed by: INTERNAL MEDICINE

## 2022-12-20 PROCEDURE — 93005 ELECTROCARDIOGRAM TRACING: CPT | Mod: S$GLB,,, | Performed by: INTERNAL MEDICINE

## 2022-12-20 PROCEDURE — 99999 PR PBB SHADOW E&M-EST. PATIENT-LVL IV: ICD-10-PCS | Mod: PBBFAC,,, | Performed by: INTERNAL MEDICINE

## 2022-12-20 PROCEDURE — 1160F PR REVIEW ALL MEDS BY PRESCRIBER/CLIN PHARMACIST DOCUMENTED: ICD-10-PCS | Mod: CPTII,S$GLB,, | Performed by: INTERNAL MEDICINE

## 2022-12-20 PROCEDURE — 3072F LOW RISK FOR RETINOPATHY: CPT | Mod: CPTII,S$GLB,, | Performed by: INTERNAL MEDICINE

## 2022-12-20 PROCEDURE — 3008F BODY MASS INDEX DOCD: CPT | Mod: CPTII,S$GLB,, | Performed by: INTERNAL MEDICINE

## 2022-12-20 PROCEDURE — 99214 OFFICE O/P EST MOD 30 MIN: CPT | Mod: S$GLB,,, | Performed by: INTERNAL MEDICINE

## 2022-12-20 RX ORDER — METOPROLOL SUCCINATE 50 MG/1
50 TABLET, EXTENDED RELEASE ORAL DAILY
Qty: 30 TABLET | Refills: 11 | Status: SHIPPED | OUTPATIENT
Start: 2022-12-20 | End: 2023-12-04

## 2022-12-20 RX ORDER — SPIRONOLACTONE 25 MG/1
25 TABLET ORAL DAILY
Qty: 90 TABLET | Refills: 3 | Status: SHIPPED | OUTPATIENT
Start: 2022-12-20 | End: 2024-01-03 | Stop reason: SDUPTHER

## 2022-12-20 NOTE — PROGRESS NOTES
Subjective:    Patient ID:  Michael Slaughter Jr. is a 62 y.o. male who presents for evaluation of Atrial Fibrillation and Congestive Heart Failure    Referring Cardiologist: Severo Palafox III, MD  Primary Care Physician: Michael Deal MD    HPI  Prior Hx:  I had the pleasure of seeing Mr. Sukhjinder Michaels today in our electrophysiology clinic in follow-up for his paroxysmal atrial fibrillation and systolic heart failure. As you are aware he is a pleasant 62 year-old man with hypertension and diabetes who was in his usually state of health until he developed heart failure symptoms for the past 1-2 months (dyspnea on exertion, orthopnea). An echocardiogram performed 11/30/2020 noted a systolic function of 25% with severe LA enlargement, moderate MR, and AF. His ECG from the same day noted sinus rhythm with a PVC (RBBB concordant at least through V3, superior axis as assessed in lead II). A stress echocardiogram in 2013 noted a LVEF of 45% without ischemic changes. He was initiated on eliquis, metoprolol and valsartan.    I reviewed available ECGs in Epic which show sinus rhythm.    At our first clinic visit 12/2020 we elected to monitor and see how much AF he was having prior to starting anti-arrhythmic drug therapy. We also discussed monitoring his EF with goal-directed medical therapy. If his EF improved would discuss ILR implant.    Mr. Slaughter wore a 14 day Bardy patch which noted 34 episodes of AF with a burden of 3% with an average ventricular rate of 107 bpm and longest episode lasting 2.6 hours. There were also 11 episodes of atrial flutter with 2:1 AV conduction lasting up to 22 minutes. There was an 11% PAC and 8% polyfocal PVC burden along with 5 episodes of nonsustained VT lasting up to 27 beats. We discussed on the phone and initiated amiodarone with plan for ECHO after he was on GDMT for 90 days and proceed with ICD implant if EF had not recovered. He is on metoprolol and entresto. A nuclear stress test from  1/2021 showed no ischemia/infarct. He followed up with me 2/2021 and he felt much better. Plan was if EF recovered we would stop amiodarone and then evaluate for recurrent arrhythmias. If EF remained 35% or less then would recommend ICD implantation.    Mr. Slaughter returned for follow-up 5/2021. He saw Dr. Palafox 4/23/2021 and his metoprolol was increased. An ECHO from 3/2021 noted some improvement in LV function (up to 35%). He continued to feel well. He exercised regularly.  We discussed that hopefully his EF would normalize with medical therapy. If so would stop amiodarone and then evaluate for recurrent arrhythmias. If EF remained 35% or less then would recommend ICD implantation (if remained NYHA class II).     Mr. Slaughter returned for follow-up 11/2021. He feels great. Reports no heart failure symptoms. Does physical work, mows his lawn, and walks 5-10k steps daily without difficulty. He walks up the stairs (3 flights) at work at the [employer] without shortness of breath.    Mr. Slaughter returned for follow-up 2/2022. He felt great! No fatigue or dyspnea on exertion. No palpitations. We stopped amiodarone with plan to try sotalol for symptomatic arrhythmia recurrence (would require inpatient stay).    6/2022: Mr Slaughter returned for follow-up. ECHO 4/2022 noted a LVEF of 40%. He continued to work, mows his lawn, and was very physically active. Feels well. He has no symptomatic arrhythmia recurrences. Discussed that his LVEF recovered on metoprolol and entresto (40% 11/2021 and 4/2022 echo) and he felt great. Will repeat 14 day holter. If he has recurrence of symptomatic arrhythmias would try sotalol (would need an inpatient stay for initiation).     7/2022: Extended holter noted sinus rhythm with no AF. 17 beat run of nonsustained VT observed. Plan was not to initiate sotalol.    Interim Hx:  Mr. Slaughter returns for follow-up. Reports some occasional gas-like chest pain episodes. Also reports occasional tripping  while walking at work at the [employer]. Feels great otherwise. No heart failure symptoms. Mows his own lawn. No palpitations.    My interpretation of today's in clinic electrocardiogram is sinus rhythm with narrow QRS and no ectopy.    Review of Systems   Constitutional: Negative for fever and malaise/fatigue.   HENT:  Negative for congestion and sore throat.    Eyes:  Negative for blurred vision and visual disturbance.   Cardiovascular:  Negative for dyspnea on exertion, irregular heartbeat, near-syncope and orthopnea.   Respiratory:  Negative for cough.    Hematologic/Lymphatic: Negative for bleeding problem. Does not bruise/bleed easily.   Skin: Negative.    Musculoskeletal: Negative.    Gastrointestinal:  Negative for bloating, abdominal pain, hematochezia and melena.   Neurological:  Negative for focal weakness.   Psychiatric/Behavioral: Negative.        Objective:    Physical Exam  Vitals reviewed.   Constitutional:       General: He is not in acute distress.     Appearance: He is well-developed. He is not diaphoretic.   HENT:      Head: Normocephalic and atraumatic.   Eyes:      General:         Right eye: No discharge.         Left eye: No discharge.      Conjunctiva/sclera: Conjunctivae normal.   Cardiovascular:      Rate and Rhythm: Normal rate and regular rhythm.      Heart sounds: No murmur heard.    No friction rub. No gallop.   Pulmonary:      Effort: Pulmonary effort is normal. No respiratory distress.      Breath sounds: Normal breath sounds. No wheezing or rales.   Abdominal:      General: Bowel sounds are normal. There is no distension.      Palpations: Abdomen is soft.      Tenderness: There is no abdominal tenderness.   Musculoskeletal:      Cervical back: Neck supple.   Skin:     General: Skin is warm and dry.   Neurological:      Mental Status: He is alert and oriented to person, place, and time.   Psychiatric:         Behavior: Behavior normal.         Thought Content: Thought content  normal.         Judgment: Judgment normal.         Assessment:       1. Paroxysmal atrial fibrillation    2. Chronic combined systolic and diastolic heart failure    3. Frequent PVCs    4. Nonsustained ventricular tachycardia    5. Essential hypertension    6. Type 2 diabetes mellitus without complication, without long-term current use of insulin         Plan:       In summary, Mr. Slaughter is a pleasant 62 year-old man with hypertension and previous nonischemic mild systolic cardiomyopathy with LVEF of 45% in 2013 now with symptomatic severe systolic heart failure with LVEF of 25% with low burden pAF but high burden PACs, 8% polyfocal PVCs and nonsustained VT. He was treated with amiodarone. This was stopped in February of 2022. His LVEF recovered on metoprolol and entresto (40% 11/2021 and 4/2022 echo). He feels great. If he has recurrence of symptomatic arrhythmias would try sotalol (would need an inpatient stay for initiation).     RTC in 6 months.     Thank you for allowing me to participate in the care of this patient. Please do not hesitate to call me with any questions or concerns.    Didier Gonzalez MD, PhD  Cardiac Electrophysiology

## 2023-01-20 ENCOUNTER — PATIENT MESSAGE (OUTPATIENT)
Dept: CARDIOLOGY | Facility: CLINIC | Age: 63
End: 2023-01-20
Payer: COMMERCIAL

## 2023-03-27 ENCOUNTER — LAB VISIT (OUTPATIENT)
Dept: LAB | Facility: HOSPITAL | Age: 63
End: 2023-03-27
Attending: FAMILY MEDICINE
Payer: COMMERCIAL

## 2023-03-27 ENCOUNTER — OFFICE VISIT (OUTPATIENT)
Dept: INTERNAL MEDICINE | Facility: CLINIC | Age: 63
End: 2023-03-27
Payer: COMMERCIAL

## 2023-03-27 ENCOUNTER — CLINICAL SUPPORT (OUTPATIENT)
Dept: OPTOMETRY | Facility: CLINIC | Age: 63
End: 2023-03-27
Attending: FAMILY MEDICINE
Payer: COMMERCIAL

## 2023-03-27 VITALS
HEART RATE: 78 BPM | DIASTOLIC BLOOD PRESSURE: 78 MMHG | HEIGHT: 71 IN | WEIGHT: 224 LBS | SYSTOLIC BLOOD PRESSURE: 116 MMHG | BODY MASS INDEX: 31.36 KG/M2 | OXYGEN SATURATION: 97 %

## 2023-03-27 DIAGNOSIS — E11.9 TYPE 2 DIABETES MELLITUS WITHOUT COMPLICATION, WITHOUT LONG-TERM CURRENT USE OF INSULIN: ICD-10-CM

## 2023-03-27 DIAGNOSIS — E66.09 CLASS 1 OBESITY DUE TO EXCESS CALORIES WITH SERIOUS COMORBIDITY AND BODY MASS INDEX (BMI) OF 31.0 TO 31.9 IN ADULT: ICD-10-CM

## 2023-03-27 DIAGNOSIS — E11.3293 MILD NONPROLIFERATIVE DIABETIC RETINOPATHY OF BOTH EYES WITHOUT MACULAR EDEMA ASSOCIATED WITH TYPE 2 DIABETES MELLITUS: Primary | ICD-10-CM

## 2023-03-27 DIAGNOSIS — R13.19 ESOPHAGEAL DYSPHAGIA: ICD-10-CM

## 2023-03-27 DIAGNOSIS — I10 ESSENTIAL HYPERTENSION: Primary | ICD-10-CM

## 2023-03-27 DIAGNOSIS — I10 ESSENTIAL HYPERTENSION: ICD-10-CM

## 2023-03-27 DIAGNOSIS — I48.0 PAROXYSMAL ATRIAL FIBRILLATION: ICD-10-CM

## 2023-03-27 DIAGNOSIS — K21.9 GASTROESOPHAGEAL REFLUX DISEASE, UNSPECIFIED WHETHER ESOPHAGITIS PRESENT: ICD-10-CM

## 2023-03-27 DIAGNOSIS — I50.42 CHRONIC COMBINED SYSTOLIC AND DIASTOLIC HEART FAILURE: ICD-10-CM

## 2023-03-27 LAB
ALBUMIN SERPL BCP-MCNC: 4.4 G/DL (ref 3.5–5.2)
ALBUMIN/CREAT UR: 5.4 UG/MG (ref 0–30)
ALP SERPL-CCNC: 47 U/L (ref 55–135)
ALT SERPL W/O P-5'-P-CCNC: 34 U/L (ref 10–44)
ANION GAP SERPL CALC-SCNC: 8 MMOL/L (ref 8–16)
AST SERPL-CCNC: 17 U/L (ref 10–40)
BASOPHILS # BLD AUTO: 0.06 K/UL (ref 0–0.2)
BASOPHILS NFR BLD: 0.8 % (ref 0–1.9)
BILIRUB SERPL-MCNC: 0.6 MG/DL (ref 0.1–1)
BUN SERPL-MCNC: 13 MG/DL (ref 8–23)
CALCIUM SERPL-MCNC: 10.2 MG/DL (ref 8.7–10.5)
CHLORIDE SERPL-SCNC: 104 MMOL/L (ref 95–110)
CO2 SERPL-SCNC: 27 MMOL/L (ref 23–29)
CREAT SERPL-MCNC: 1.3 MG/DL (ref 0.5–1.4)
CREAT UR-MCNC: 167 MG/DL (ref 23–375)
DIFFERENTIAL METHOD: ABNORMAL
EOSINOPHIL # BLD AUTO: 0.1 K/UL (ref 0–0.5)
EOSINOPHIL NFR BLD: 1.5 % (ref 0–8)
ERYTHROCYTE [DISTWIDTH] IN BLOOD BY AUTOMATED COUNT: 13.4 % (ref 11.5–14.5)
EST. GFR  (NO RACE VARIABLE): >60 ML/MIN/1.73 M^2
ESTIMATED AVG GLUCOSE: 197 MG/DL (ref 68–131)
GLUCOSE SERPL-MCNC: 129 MG/DL (ref 70–110)
HBA1C MFR BLD: 8.5 % (ref 4–5.6)
HCT VFR BLD AUTO: 45.8 % (ref 40–54)
HGB BLD-MCNC: 14.9 G/DL (ref 14–18)
IMM GRANULOCYTES # BLD AUTO: 0.02 K/UL (ref 0–0.04)
IMM GRANULOCYTES NFR BLD AUTO: 0.3 % (ref 0–0.5)
LYMPHOCYTES # BLD AUTO: 2.1 K/UL (ref 1–4.8)
LYMPHOCYTES NFR BLD: 27.5 % (ref 18–48)
MCH RBC QN AUTO: 26.7 PG (ref 27–31)
MCHC RBC AUTO-ENTMCNC: 32.5 G/DL (ref 32–36)
MCV RBC AUTO: 82 FL (ref 82–98)
MICROALBUMIN UR DL<=1MG/L-MCNC: 9 UG/ML
MONOCYTES # BLD AUTO: 0.7 K/UL (ref 0.3–1)
MONOCYTES NFR BLD: 9.2 % (ref 4–15)
NEUTROPHILS # BLD AUTO: 4.6 K/UL (ref 1.8–7.7)
NEUTROPHILS NFR BLD: 60.7 % (ref 38–73)
NRBC BLD-RTO: 0 /100 WBC
PLATELET # BLD AUTO: 310 K/UL (ref 150–450)
PMV BLD AUTO: 11.3 FL (ref 9.2–12.9)
POTASSIUM SERPL-SCNC: 4.9 MMOL/L (ref 3.5–5.1)
PROT SERPL-MCNC: 7.7 G/DL (ref 6–8.4)
RBC # BLD AUTO: 5.59 M/UL (ref 4.6–6.2)
SODIUM SERPL-SCNC: 139 MMOL/L (ref 136–145)
WBC # BLD AUTO: 7.49 K/UL (ref 3.9–12.7)

## 2023-03-27 PROCEDURE — 3074F SYST BP LT 130 MM HG: CPT | Mod: CPTII,S$GLB,, | Performed by: FAMILY MEDICINE

## 2023-03-27 PROCEDURE — 3008F PR BODY MASS INDEX (BMI) DOCUMENTED: ICD-10-PCS | Mod: CPTII,S$GLB,, | Performed by: FAMILY MEDICINE

## 2023-03-27 PROCEDURE — 4010F PR ACE/ARB THEARPY RXD/TAKEN: ICD-10-PCS | Mod: CPTII,S$GLB,, | Performed by: FAMILY MEDICINE

## 2023-03-27 PROCEDURE — 92228 DIABETIC EYE SCREENING PHOTO: ICD-10-PCS | Mod: 26,S$GLB,, | Performed by: OPTOMETRIST

## 2023-03-27 PROCEDURE — 4010F ACE/ARB THERAPY RXD/TAKEN: CPT | Mod: CPTII,S$GLB,, | Performed by: FAMILY MEDICINE

## 2023-03-27 PROCEDURE — 3074F PR MOST RECENT SYSTOLIC BLOOD PRESSURE < 130 MM HG: ICD-10-PCS | Mod: CPTII,S$GLB,, | Performed by: FAMILY MEDICINE

## 2023-03-27 PROCEDURE — 92228 IMG RTA DETC/MNTR DS PHY/QHP: CPT | Mod: 26,S$GLB,, | Performed by: OPTOMETRIST

## 2023-03-27 PROCEDURE — 36415 COLL VENOUS BLD VENIPUNCTURE: CPT | Performed by: FAMILY MEDICINE

## 2023-03-27 PROCEDURE — 92228 DIABETIC EYE SCREENING PHOTO: ICD-10-PCS | Mod: TC,S$GLB,, | Performed by: FAMILY MEDICINE

## 2023-03-27 PROCEDURE — 99215 PR OFFICE/OUTPT VISIT, EST, LEVL V, 40-54 MIN: ICD-10-PCS | Mod: S$GLB,,, | Performed by: FAMILY MEDICINE

## 2023-03-27 PROCEDURE — 2024F 7 FLD RTA PHOTO EVC RTNOPTHY: CPT | Mod: CPTII,S$GLB,, | Performed by: OPTOMETRIST

## 2023-03-27 PROCEDURE — 99999 PR PBB SHADOW E&M-EST. PATIENT-LVL IV: CPT | Mod: PBBFAC,,, | Performed by: FAMILY MEDICINE

## 2023-03-27 PROCEDURE — 3078F PR MOST RECENT DIASTOLIC BLOOD PRESSURE < 80 MM HG: ICD-10-PCS | Mod: CPTII,S$GLB,, | Performed by: FAMILY MEDICINE

## 2023-03-27 PROCEDURE — 3078F DIAST BP <80 MM HG: CPT | Mod: CPTII,S$GLB,, | Performed by: FAMILY MEDICINE

## 2023-03-27 PROCEDURE — 1159F PR MEDICATION LIST DOCUMENTED IN MEDICAL RECORD: ICD-10-PCS | Mod: CPTII,S$GLB,, | Performed by: FAMILY MEDICINE

## 2023-03-27 PROCEDURE — 92228 IMG RTA DETC/MNTR DS PHY/QHP: CPT | Mod: TC,S$GLB,, | Performed by: FAMILY MEDICINE

## 2023-03-27 PROCEDURE — 99999 PR PBB SHADOW E&M-EST. PATIENT-LVL IV: ICD-10-PCS | Mod: PBBFAC,,, | Performed by: FAMILY MEDICINE

## 2023-03-27 PROCEDURE — 80053 COMPREHEN METABOLIC PANEL: CPT | Performed by: FAMILY MEDICINE

## 2023-03-27 PROCEDURE — 85025 COMPLETE CBC W/AUTO DIFF WBC: CPT | Performed by: FAMILY MEDICINE

## 2023-03-27 PROCEDURE — 99215 OFFICE O/P EST HI 40 MIN: CPT | Mod: S$GLB,,, | Performed by: FAMILY MEDICINE

## 2023-03-27 PROCEDURE — 2024F PR 7 FIELD PHOTOS WITH INTERP/ REVIEW: ICD-10-PCS | Mod: CPTII,S$GLB,, | Performed by: OPTOMETRIST

## 2023-03-27 PROCEDURE — 82570 ASSAY OF URINE CREATININE: CPT | Performed by: FAMILY MEDICINE

## 2023-03-27 PROCEDURE — 1159F MED LIST DOCD IN RCRD: CPT | Mod: CPTII,S$GLB,, | Performed by: FAMILY MEDICINE

## 2023-03-27 PROCEDURE — 83036 HEMOGLOBIN GLYCOSYLATED A1C: CPT | Performed by: FAMILY MEDICINE

## 2023-03-27 PROCEDURE — 3008F BODY MASS INDEX DOCD: CPT | Mod: CPTII,S$GLB,, | Performed by: FAMILY MEDICINE

## 2023-03-27 RX ORDER — ATORVASTATIN CALCIUM 10 MG/1
10 TABLET, FILM COATED ORAL DAILY
Qty: 90 TABLET | Refills: 3 | Status: SHIPPED | OUTPATIENT
Start: 2023-03-27

## 2023-03-27 RX ORDER — PANTOPRAZOLE SODIUM 40 MG/1
40 TABLET, DELAYED RELEASE ORAL DAILY
Qty: 30 TABLET | Refills: 11 | Status: SHIPPED | OUTPATIENT
Start: 2023-03-27 | End: 2024-03-26

## 2023-03-27 NOTE — PROGRESS NOTES
Subjective:       Patient ID: Michael Slaughter Jr. is a 62 y.o. male.    Chief Complaint: No chief complaint on file.    Patient is here for follow-up of their chronic diseases  Ozempic 5mg caused too much nausea, cannot tolerate, last dose 2 weeks ago    Difficulty swallowing - food seems to get stuck mid esophagus and he has to wait for it to pass before eating more.  +new GERD recently    Diabetes Management Status  Lab Results   Component Value Date    HGBA1C 7.4 (H) 10/03/2022    HGBA1C 7.0 (H) 03/23/2022    HGBA1C 7.3 (H) 10/28/2021      Statin: Taking  ACE/ARB: Not taking    Screening or Prevention Patient's value Goal Complete/Controlled?   HgA1C Testing and Control   Lab Results   Component Value Date    HGBA1C 7.4 (H) 10/03/2022      Annually/Less than 8% Yes     Lipid profile : 10/03/2022 Annually Yes     LDL control Lab Results   Component Value Date    LDLCALC 73.8 10/03/2022    Annually/Less than 100 mg/dl  Yes     Nephropathy screening Lab Results   Component Value Date    LABMICR 8.0 03/23/2022     Lab Results   Component Value Date    PROTEINUA Negative 09/17/2019     Lab Results   Component Value Date    UTPCR 0.11 09/21/2021      Annually No     Blood pressure BP Readings from Last 1 Encounters:   12/20/22 118/78    Less than 140/90 Yes     Dilated retinal exam : 06/23/2021 Annually No     Foot exam   : 09/26/2022 Annually Yes           Review of patient's allergies indicates:   Allergen Reactions    Lisinopril      Other reaction(s): cough       Social History     Tobacco Use    Smoking status: Former     Types: Cigarettes    Smokeless tobacco: Never   Substance Use Topics    Alcohol use: Yes     Comment: daily    Drug use: No       Family History   Problem Relation Age of Onset    Cancer Mother         breast    Birth defects Maternal Grandmother         brain tumor    Heart disease Neg Hx     Stroke Neg Hx     Amblyopia Neg Hx     Blindness Neg Hx     Cataracts Neg Hx     Glaucoma Neg Hx      Hypertension Neg Hx     Macular degeneration Neg Hx     Retinal detachment Neg Hx     Strabismus Neg Hx        No past surgical history on file.    Patient Active Problem List   Diagnosis    Essential hypertension    Type 2 diabetes mellitus without complication, without long-term current use of insulin    GERD (gastroesophageal reflux disease)    Sinusitis    ED (erectile dysfunction)    Colon cancer screening    Paroxysmal atrial fibrillation    Chronic combined systolic and diastolic heart failure    Anticoagulated    Frequent PVCs    Nonsustained ventricular tachycardia    Class 1 obesity due to excess calories with serious comorbidity and body mass index (BMI) of 30.0 to 30.9 in adult       Current Outpatient Medications on File Prior to Visit   Medication Sig Dispense Refill    apixaban (ELIQUIS) 5 mg Tab Take 1 tablet (5 mg total) by mouth 2 (two) times daily. 60 tablet 10    atorvastatin (LIPITOR) 10 MG tablet TAKE 1 TABLET (10 MG TOTAL) BY MOUTH ONCE DAILY. 30 tablet 3    blood sugar diagnostic Strp 1 strip by Misc.(Non-Drug; Combo Route) route 5 (five) times daily. Whatever insurance allows 400 each 12    blood-glucose meter kit Use as instructed 1 each 0    furosemide (LASIX) 40 MG tablet Take 1 tablet (40 mg total) by mouth daily as needed. 90 tablet 3    metFORMIN (GLUCOPHAGE) 1000 MG tablet TAKE 1 TABLET BY MOUTH TWICE A DAY WITH MEALS 180 tablet 1    metoprolol succinate (TOPROL-XL) 50 MG 24 hr tablet Take 1 tablet (50 mg total) by mouth once daily. 30 tablet 11    ONETOUCH DELICA LANCETS 30 gauge Misc       sacubitriL-valsartan (ENTRESTO)  mg per tablet Take 1 tablet by mouth 2 (two) times daily. 60 tablet 11    spironolactone (ALDACTONE) 25 MG tablet Take 1 tablet (25 mg total) by mouth once daily. 90 tablet 3     No current facility-administered medications on file prior to visit.           Review of Systems   Constitutional:  Negative for chills and fever.   HENT:  Negative for ear pain.   "  Eyes:  Negative for pain.   Respiratory:  Negative for chest tightness.    Cardiovascular:  Negative for chest pain.   Gastrointestinal:  Positive for nausea. Negative for abdominal pain.        GERD heartburn, dysphagia   Genitourinary:  Negative for flank pain.   Musculoskeletal:  Negative for gait problem.   Neurological:  Negative for syncope.   Psychiatric/Behavioral:  Negative for behavioral problems.      Objective:    /78 (BP Location: Left arm, Patient Position: Sitting, BP Method: Medium (Manual))   Pulse 78   Ht 5' 11" (1.803 m)   Wt 101.6 kg (224 lb)   SpO2 97%   BMI 31.24 kg/m²     Physical Exam  Constitutional:       Appearance: He is well-developed.   HENT:      Head: Normocephalic and atraumatic.   Cardiovascular:      Rate and Rhythm: Normal rate.      Heart sounds: Normal heart sounds.   Pulmonary:      Effort: No respiratory distress.      Breath sounds: Normal breath sounds. No wheezing or rales.   Abdominal:      Palpations: Abdomen is soft.   Musculoskeletal:      Cervical back: Neck supple.   Skin:     General: Skin is dry.   Neurological:      Mental Status: He is alert.   Psychiatric:         Behavior: Behavior normal.       Assessment:       1. Essential hypertension    2. Type 2 diabetes mellitus without complication, without long-term current use of insulin    3. Class 1 obesity due to excess calories with serious comorbidity and body mass index (BMI) of 31.0 to 31.9 in adult    4. Chronic combined systolic and diastolic heart failure    5. Paroxysmal atrial fibrillation    6. Esophageal dysphagia    7. Gastroesophageal reflux disease, unspecified whether esophagitis present          Plan:       Michael was seen today for follow-up.    Diagnoses and all orders for this visit:    Essential hypertension  -controlled, stable, no change in meds   -     Microalbumin/Creatinine Ratio, Urine  -     Comprehensive Metabolic Panel; Future  -     CBC Auto Differential; Future    Type 2 " diabetes mellitus without complication, without long-term current use of insulin  -monitoring today   -     Microalbumin/Creatinine Ratio, Urine  -     Comprehensive Metabolic Panel; Future  -     Hemoglobin A1C; Future  -     Diabetic Eye Screening Photo; Future  -     Ambulatory referral/consult to Optometry; Future  -     atorvastatin (LIPITOR) 10 MG tablet; Take 1 tablet (10 mg total) by mouth once daily.  -d/c ozempic as intolerant  -pending today's a1c consider mounjaro vs jardiance    Class 1 obesity due to excess calories with serious comorbidity and body mass index (BMI) of 31.0 to 31.9 in adult  -discussed diet and exercise  -intolerant of ozempic , if can tolerate mounjaro would help weight loss  Wt Readings from Last 3 Encounters:   03/27/23 101.6 kg (224 lb)   12/20/22 102.6 kg (226 lb 3.1 oz)   10/03/22 103.8 kg (228 lb 13.4 oz)     Chronic combined systolic and diastolic heart failure  -stable  -jardiance may be helpful    Paroxysmal atrial fibrillation  -followed by EPS    Esophageal dysphagia  -     Ambulatory referral/consult to Gastroenterology; Future    Gastroesophageal reflux disease, unspecified whether esophagitis present  -     Ambulatory referral/consult to Gastroenterology; Future  -     pantoprazole (PROTONIX) 40 MG tablet; Take 1 tablet (40 mg total) by mouth once daily.    Follow up in about 3 months (around 6/27/2023) for dm wothout glp1.

## 2023-03-27 NOTE — PROGRESS NOTES
Michael Slaughter  is a 62 y.o. male here for a diabetic eye screening with non-dilated fundus photos per Dr. Deal.    Patient cooperative?: Yes  Small pupils?: Yes  Last eye exam: 6/23/2021    For exam results, see Encounter Report.

## 2023-03-28 ENCOUNTER — TELEPHONE (OUTPATIENT)
Dept: INTERNAL MEDICINE | Facility: CLINIC | Age: 63
End: 2023-03-28
Payer: COMMERCIAL

## 2023-03-28 DIAGNOSIS — E11.9 TYPE 2 DIABETES MELLITUS WITHOUT COMPLICATION, WITHOUT LONG-TERM CURRENT USE OF INSULIN: Primary | ICD-10-CM

## 2023-04-27 ENCOUNTER — PATIENT MESSAGE (OUTPATIENT)
Dept: INTERNAL MEDICINE | Facility: CLINIC | Age: 63
End: 2023-04-27
Payer: COMMERCIAL

## 2023-05-08 ENCOUNTER — PATIENT OUTREACH (OUTPATIENT)
Dept: ADMINISTRATIVE | Facility: HOSPITAL | Age: 63
End: 2023-05-08
Payer: COMMERCIAL

## 2023-05-08 NOTE — PROGRESS NOTES
There are no preventive care reminders to display for this patient.    Triggered LINKS. Updated Care Everywhere. Checked for outside lab results in [facility] and [facility] [facility]; no results found. Pt has PCP office visit on 6/27/2023- added diabetic labs due to appt note. Chart review completed as part of [facility] A1c report.

## 2023-05-19 ENCOUNTER — TELEPHONE (OUTPATIENT)
Dept: ENDOSCOPY | Facility: HOSPITAL | Age: 63
End: 2023-05-19
Payer: COMMERCIAL

## 2023-05-22 ENCOUNTER — OFFICE VISIT (OUTPATIENT)
Dept: GASTROENTEROLOGY | Facility: CLINIC | Age: 63
End: 2023-05-22
Payer: COMMERCIAL

## 2023-05-22 VITALS
HEIGHT: 71 IN | SYSTOLIC BLOOD PRESSURE: 100 MMHG | HEART RATE: 81 BPM | WEIGHT: 221.31 LBS | BODY MASS INDEX: 30.98 KG/M2 | DIASTOLIC BLOOD PRESSURE: 61 MMHG

## 2023-05-22 DIAGNOSIS — K21.9 GASTROESOPHAGEAL REFLUX DISEASE, UNSPECIFIED WHETHER ESOPHAGITIS PRESENT: ICD-10-CM

## 2023-05-22 DIAGNOSIS — R13.19 ESOPHAGEAL DYSPHAGIA: ICD-10-CM

## 2023-05-22 PROCEDURE — 3052F PR MOST RECENT HEMOGLOBIN A1C LEVEL 8.0 - < 9.0%: ICD-10-PCS | Mod: CPTII,S$GLB,, | Performed by: INTERNAL MEDICINE

## 2023-05-22 PROCEDURE — 3066F NEPHROPATHY DOC TX: CPT | Mod: CPTII,S$GLB,, | Performed by: INTERNAL MEDICINE

## 2023-05-22 PROCEDURE — 99204 OFFICE O/P NEW MOD 45 MIN: CPT | Mod: S$GLB,,, | Performed by: INTERNAL MEDICINE

## 2023-05-22 PROCEDURE — 4010F PR ACE/ARB THEARPY RXD/TAKEN: ICD-10-PCS | Mod: CPTII,S$GLB,, | Performed by: INTERNAL MEDICINE

## 2023-05-22 PROCEDURE — 3061F PR NEG MICROALBUMINURIA RESULT DOCUMENTED/REVIEW: ICD-10-PCS | Mod: CPTII,S$GLB,, | Performed by: INTERNAL MEDICINE

## 2023-05-22 PROCEDURE — 4010F ACE/ARB THERAPY RXD/TAKEN: CPT | Mod: CPTII,S$GLB,, | Performed by: INTERNAL MEDICINE

## 2023-05-22 PROCEDURE — 3008F PR BODY MASS INDEX (BMI) DOCUMENTED: ICD-10-PCS | Mod: CPTII,S$GLB,, | Performed by: INTERNAL MEDICINE

## 2023-05-22 PROCEDURE — 3008F BODY MASS INDEX DOCD: CPT | Mod: CPTII,S$GLB,, | Performed by: INTERNAL MEDICINE

## 2023-05-22 PROCEDURE — 1159F MED LIST DOCD IN RCRD: CPT | Mod: CPTII,S$GLB,, | Performed by: INTERNAL MEDICINE

## 2023-05-22 PROCEDURE — 99204 PR OFFICE/OUTPT VISIT, NEW, LEVL IV, 45-59 MIN: ICD-10-PCS | Mod: S$GLB,,, | Performed by: INTERNAL MEDICINE

## 2023-05-22 PROCEDURE — 3061F NEG MICROALBUMINURIA REV: CPT | Mod: CPTII,S$GLB,, | Performed by: INTERNAL MEDICINE

## 2023-05-22 PROCEDURE — 3074F PR MOST RECENT SYSTOLIC BLOOD PRESSURE < 130 MM HG: ICD-10-PCS | Mod: CPTII,S$GLB,, | Performed by: INTERNAL MEDICINE

## 2023-05-22 PROCEDURE — 99999 PR PBB SHADOW E&M-EST. PATIENT-LVL III: CPT | Mod: PBBFAC,,, | Performed by: INTERNAL MEDICINE

## 2023-05-22 PROCEDURE — 3066F PR DOCUMENTATION OF TREATMENT FOR NEPHROPATHY: ICD-10-PCS | Mod: CPTII,S$GLB,, | Performed by: INTERNAL MEDICINE

## 2023-05-22 PROCEDURE — 3074F SYST BP LT 130 MM HG: CPT | Mod: CPTII,S$GLB,, | Performed by: INTERNAL MEDICINE

## 2023-05-22 PROCEDURE — 1159F PR MEDICATION LIST DOCUMENTED IN MEDICAL RECORD: ICD-10-PCS | Mod: CPTII,S$GLB,, | Performed by: INTERNAL MEDICINE

## 2023-05-22 PROCEDURE — 3078F DIAST BP <80 MM HG: CPT | Mod: CPTII,S$GLB,, | Performed by: INTERNAL MEDICINE

## 2023-05-22 PROCEDURE — 3078F PR MOST RECENT DIASTOLIC BLOOD PRESSURE < 80 MM HG: ICD-10-PCS | Mod: CPTII,S$GLB,, | Performed by: INTERNAL MEDICINE

## 2023-05-22 PROCEDURE — 99999 PR PBB SHADOW E&M-EST. PATIENT-LVL III: ICD-10-PCS | Mod: PBBFAC,,, | Performed by: INTERNAL MEDICINE

## 2023-05-22 PROCEDURE — 3052F HG A1C>EQUAL 8.0%<EQUAL 9.0%: CPT | Mod: CPTII,S$GLB,, | Performed by: INTERNAL MEDICINE

## 2023-06-26 ENCOUNTER — TELEPHONE (OUTPATIENT)
Dept: INTERNAL MEDICINE | Facility: CLINIC | Age: 63
End: 2023-06-26

## 2023-06-26 ENCOUNTER — LAB VISIT (OUTPATIENT)
Dept: LAB | Facility: HOSPITAL | Age: 63
End: 2023-06-26
Attending: FAMILY MEDICINE
Payer: COMMERCIAL

## 2023-06-26 ENCOUNTER — OFFICE VISIT (OUTPATIENT)
Dept: INTERNAL MEDICINE | Facility: CLINIC | Age: 63
End: 2023-06-26
Payer: COMMERCIAL

## 2023-06-26 VITALS
HEIGHT: 71 IN | HEART RATE: 70 BPM | WEIGHT: 218 LBS | BODY MASS INDEX: 30.52 KG/M2 | DIASTOLIC BLOOD PRESSURE: 72 MMHG | SYSTOLIC BLOOD PRESSURE: 126 MMHG | OXYGEN SATURATION: 99 %

## 2023-06-26 DIAGNOSIS — I10 ESSENTIAL HYPERTENSION: ICD-10-CM

## 2023-06-26 DIAGNOSIS — L30.9 DERMATITIS: ICD-10-CM

## 2023-06-26 DIAGNOSIS — E11.9 TYPE 2 DIABETES MELLITUS WITHOUT COMPLICATION, WITHOUT LONG-TERM CURRENT USE OF INSULIN: Primary | ICD-10-CM

## 2023-06-26 DIAGNOSIS — M54.41 ACUTE RIGHT-SIDED LOW BACK PAIN WITH RIGHT-SIDED SCIATICA: ICD-10-CM

## 2023-06-26 LAB
BACTERIA #/AREA URNS AUTO: NORMAL /HPF
BILIRUB UR QL STRIP: NEGATIVE
CLARITY UR REFRACT.AUTO: CLEAR
COLOR UR AUTO: COLORLESS
GLUCOSE UR QL STRIP: ABNORMAL
HGB UR QL STRIP: NEGATIVE
KETONES UR QL STRIP: NEGATIVE
LEUKOCYTE ESTERASE UR QL STRIP: NEGATIVE
MICROSCOPIC COMMENT: NORMAL
NITRITE UR QL STRIP: NEGATIVE
PH UR STRIP: 5 [PH] (ref 5–8)
PROT UR QL STRIP: NEGATIVE
SP GR UR STRIP: 1.02 (ref 1–1.03)
URN SPEC COLLECT METH UR: ABNORMAL
YEAST UR QL AUTO: NORMAL

## 2023-06-26 PROCEDURE — 99999 PR PBB SHADOW E&M-EST. PATIENT-LVL V: CPT | Mod: PBBFAC,,, | Performed by: FAMILY MEDICINE

## 2023-06-26 PROCEDURE — 3078F PR MOST RECENT DIASTOLIC BLOOD PRESSURE < 80 MM HG: ICD-10-PCS | Mod: CPTII,S$GLB,, | Performed by: FAMILY MEDICINE

## 2023-06-26 PROCEDURE — 4010F PR ACE/ARB THEARPY RXD/TAKEN: ICD-10-PCS | Mod: CPTII,S$GLB,, | Performed by: FAMILY MEDICINE

## 2023-06-26 PROCEDURE — 3078F DIAST BP <80 MM HG: CPT | Mod: CPTII,S$GLB,, | Performed by: FAMILY MEDICINE

## 2023-06-26 PROCEDURE — 3008F PR BODY MASS INDEX (BMI) DOCUMENTED: ICD-10-PCS | Mod: CPTII,S$GLB,, | Performed by: FAMILY MEDICINE

## 2023-06-26 PROCEDURE — 1159F PR MEDICATION LIST DOCUMENTED IN MEDICAL RECORD: ICD-10-PCS | Mod: CPTII,S$GLB,, | Performed by: FAMILY MEDICINE

## 2023-06-26 PROCEDURE — 3008F BODY MASS INDEX DOCD: CPT | Mod: CPTII,S$GLB,, | Performed by: FAMILY MEDICINE

## 2023-06-26 PROCEDURE — 99215 OFFICE O/P EST HI 40 MIN: CPT | Mod: S$GLB,,, | Performed by: FAMILY MEDICINE

## 2023-06-26 PROCEDURE — 3074F PR MOST RECENT SYSTOLIC BLOOD PRESSURE < 130 MM HG: ICD-10-PCS | Mod: CPTII,S$GLB,, | Performed by: FAMILY MEDICINE

## 2023-06-26 PROCEDURE — 99999 PR PBB SHADOW E&M-EST. PATIENT-LVL V: ICD-10-PCS | Mod: PBBFAC,,, | Performed by: FAMILY MEDICINE

## 2023-06-26 PROCEDURE — 3052F HG A1C>EQUAL 8.0%<EQUAL 9.0%: CPT | Mod: CPTII,S$GLB,, | Performed by: FAMILY MEDICINE

## 2023-06-26 PROCEDURE — 3074F SYST BP LT 130 MM HG: CPT | Mod: CPTII,S$GLB,, | Performed by: FAMILY MEDICINE

## 2023-06-26 PROCEDURE — 3066F PR DOCUMENTATION OF TREATMENT FOR NEPHROPATHY: ICD-10-PCS | Mod: CPTII,S$GLB,, | Performed by: FAMILY MEDICINE

## 2023-06-26 PROCEDURE — 1159F MED LIST DOCD IN RCRD: CPT | Mod: CPTII,S$GLB,, | Performed by: FAMILY MEDICINE

## 2023-06-26 PROCEDURE — 3061F NEG MICROALBUMINURIA REV: CPT | Mod: CPTII,S$GLB,, | Performed by: FAMILY MEDICINE

## 2023-06-26 PROCEDURE — 3052F PR MOST RECENT HEMOGLOBIN A1C LEVEL 8.0 - < 9.0%: ICD-10-PCS | Mod: CPTII,S$GLB,, | Performed by: FAMILY MEDICINE

## 2023-06-26 PROCEDURE — 3061F PR NEG MICROALBUMINURIA RESULT DOCUMENTED/REVIEW: ICD-10-PCS | Mod: CPTII,S$GLB,, | Performed by: FAMILY MEDICINE

## 2023-06-26 PROCEDURE — 4010F ACE/ARB THERAPY RXD/TAKEN: CPT | Mod: CPTII,S$GLB,, | Performed by: FAMILY MEDICINE

## 2023-06-26 PROCEDURE — 99215 PR OFFICE/OUTPT VISIT, EST, LEVL V, 40-54 MIN: ICD-10-PCS | Mod: S$GLB,,, | Performed by: FAMILY MEDICINE

## 2023-06-26 PROCEDURE — 81001 URINALYSIS AUTO W/SCOPE: CPT | Performed by: FAMILY MEDICINE

## 2023-06-26 PROCEDURE — 3066F NEPHROPATHY DOC TX: CPT | Mod: CPTII,S$GLB,, | Performed by: FAMILY MEDICINE

## 2023-06-26 RX ORDER — TIZANIDINE 4 MG/1
4 TABLET ORAL EVERY 6 HOURS PRN
Qty: 40 TABLET | Refills: 0 | Status: SHIPPED | OUTPATIENT
Start: 2023-06-26 | End: 2023-07-06

## 2023-06-26 RX ORDER — MELOXICAM 7.5 MG/1
7.5 TABLET ORAL DAILY
Qty: 10 TABLET | Refills: 0 | Status: SHIPPED | OUTPATIENT
Start: 2023-06-26 | End: 2023-07-06

## 2023-06-26 RX ORDER — CLOTRIMAZOLE AND BETAMETHASONE DIPROPIONATE 10; .64 MG/G; MG/G
CREAM TOPICAL
Qty: 15 G | Refills: 0 | Status: SHIPPED | OUTPATIENT
Start: 2023-06-26 | End: 2023-12-13

## 2023-06-26 NOTE — PROGRESS NOTES
Subjective:       Patient ID: Micahel Slaughter Jr. is a 62 y.o. male.    Chief Complaint: No chief complaint on file.    Patient is here for follow-up of their chronic diseases    Could not tolerate glp-1 due to nausea  Having a hassle with jardiance with frequent urination    Diabetes Management Status  Lab Results   Component Value Date    HGBA1C 8.5 (H) 03/27/2023    HGBA1C 7.4 (H) 10/03/2022    HGBA1C 7.0 (H) 03/23/2022      Statin: Taking  ACE/ARB: Not taking    Screening or Prevention Patient's value Goal Complete/Controlled?   HgA1C Testing and Control   Lab Results   Component Value Date    HGBA1C 8.5 (H) 03/27/2023      Annually/Less than 8% No   Lipid profile : 10/03/2022 Annually Yes   LDL control Lab Results   Component Value Date    LDLCALC 73.8 10/03/2022    Annually/Less than 100 mg/dl  Yes   Nephropathy screening Lab Results   Component Value Date    LABMICR 9.0 03/27/2023     Lab Results   Component Value Date    PROTEINUA Negative 09/17/2019     Lab Results   Component Value Date    UTPCR 0.11 09/21/2021      Annually Yes   Blood pressure BP Readings from Last 1 Encounters:   05/22/23 100/61    Less than 140/90 Yes   Dilated retinal exam : 03/27/2023 Annually Yes   Foot exam   : 09/26/2022 Annually Yes         Review of patient's allergies indicates:   Allergen Reactions    Lisinopril      Other reaction(s): cough    Ozempic [semaglutide] Other (See Comments)     Intolerant/GI       Social History     Tobacco Use    Smoking status: Former     Types: Cigarettes    Smokeless tobacco: Never   Substance Use Topics    Alcohol use: Yes     Comment: daily    Drug use: No       Family History   Problem Relation Age of Onset    Cancer Mother         breast    Birth defects Maternal Grandmother         brain tumor    Heart disease Neg Hx     Stroke Neg Hx     Amblyopia Neg Hx     Blindness Neg Hx     Cataracts Neg Hx     Glaucoma Neg Hx     Hypertension Neg Hx     Macular degeneration Neg Hx     Retinal  detachment Neg Hx     Strabismus Neg Hx     Colon cancer Neg Hx     Esophageal cancer Neg Hx        No past surgical history on file.    Patient Active Problem List   Diagnosis    Essential hypertension    Type 2 diabetes mellitus without complication, without long-term current use of insulin    GERD (gastroesophageal reflux disease)    Sinusitis    ED (erectile dysfunction)    Colon cancer screening    Paroxysmal atrial fibrillation    Chronic combined systolic and diastolic heart failure    Anticoagulated    Frequent PVCs    Nonsustained ventricular tachycardia    Class 1 obesity due to excess calories with serious comorbidity and body mass index (BMI) of 31.0 to 31.9 in adult       Current Outpatient Medications on File Prior to Visit   Medication Sig Dispense Refill    apixaban (ELIQUIS) 5 mg Tab Take 1 tablet (5 mg total) by mouth 2 (two) times daily. 60 tablet 10    atorvastatin (LIPITOR) 10 MG tablet Take 1 tablet (10 mg total) by mouth once daily. 90 tablet 3    blood sugar diagnostic Strp 1 strip by Misc.(Non-Drug; Combo Route) route 5 (five) times daily. Whatever insurance allows 400 each 12    blood-glucose meter kit Use as instructed 1 each 0    empagliflozin (JARDIANCE) 10 mg tablet Take 1 tablet (10 mg total) by mouth once daily. 90 tablet 3    metFORMIN (GLUCOPHAGE) 1000 MG tablet TAKE 1 TABLET BY MOUTH TWICE A DAY WITH MEALS 180 tablet 1    metoprolol succinate (TOPROL-XL) 50 MG 24 hr tablet Take 1 tablet (50 mg total) by mouth once daily. 30 tablet 11    pantoprazole (PROTONIX) 40 MG tablet Take 1 tablet (40 mg total) by mouth once daily. 30 tablet 11    sacubitriL-valsartan (ENTRESTO)  mg per tablet Take 1 tablet by mouth 2 (two) times daily. 60 tablet 11    spironolactone (ALDACTONE) 25 MG tablet Take 1 tablet (25 mg total) by mouth once daily. 90 tablet 3     No current facility-administered medications on file prior to visit.           Review of Systems   Constitutional:  Negative for  "chills and fever.   HENT:  Negative for ear pain.    Eyes:  Negative for pain.   Respiratory:  Negative for chest tightness.    Cardiovascular:  Negative for chest pain.   Gastrointestinal:  Negative for abdominal pain.   Genitourinary:  Positive for frequency. Negative for flank pain.        Frequency he attributes to jardiance   Musculoskeletal:  Positive for back pain. Negative for gait problem.        2 days ago got out of bed with right lower back pain that is sometimes severe and goes down his thigh   Skin:         Skin prob behind right ear     Neurological:  Negative for syncope.   Psychiatric/Behavioral:  Negative for behavioral problems.      Objective:    /72 (BP Location: Right arm, Patient Position: Sitting, BP Method: Medium (Manual))   Pulse 70   Ht 5' 11" (1.803 m)   Wt 98.9 kg (218 lb)   SpO2 99%   BMI 30.40 kg/m²     Physical Exam  Constitutional:       Appearance: He is well-developed.   HENT:      Head: Normocephalic and atraumatic.        Comments: Skin behind right ear had breakdown c/w tinea or yeast infection  Cardiovascular:      Rate and Rhythm: Normal rate.      Heart sounds: Normal heart sounds.   Pulmonary:      Effort: No respiratory distress.      Breath sounds: Normal breath sounds. No wheezing or rales.   Abdominal:      Palpations: Abdomen is soft.   Musculoskeletal:      Cervical back: Neck supple.      Comments: No lower back spinous process tenderness or left lumbar tenderness. Mild right lumbar tenderness   Skin:     General: Skin is dry.   Neurological:      Mental Status: He is alert.   Psychiatric:         Behavior: Behavior normal.       Assessment:       1. Type 2 diabetes mellitus without complication, without long-term current use of insulin    2. Essential hypertension    3. Acute right-sided low back pain with right-sided sciatica    4. Dermatitis        Plan:       Michael was seen today for follow-up.    Diagnoses and all orders for this visit:    Type 2 " diabetes mellitus without complication, without long-term current use of insulin  -monitoring today   -     Hemoglobin A1C; Future  -jardiance causing freq urination, wonders if there is an alternative - wait for labs first    Essential hypertension  -monitoring today   -     Basic Metabolic Panel; Future  -     Urinalysis, Reflex to Urine Culture Urine, Clean Catch; Future    Acute right-sided low back pain with right-sided sciatica  -     meloxicam (MOBIC) 7.5 MG tablet; Take 1 tablet (7.5 mg total) by mouth once daily for 10 doses  -     Ambulatory referral/consult to Physical/Occupational Therapy; Future  -     tiZANidine (ZANAFLEX) 4 MG tablet; Take 1 tablet (4 mg total) by mouth every 6 (six) hours as needed (muscle spasm).  -f/u prn    Dermatitis  -     clotrimazole-betamethasone 1-0.05% (LOTRISONE) cream; Apply behind right ear BID till skin problem resolved        Follow up in about 3 months (around 9/26/2023) for dm.

## 2023-06-26 NOTE — TELEPHONE ENCOUNTER
----- Message from Heather Greer sent at 6/26/2023 10:56 AM CDT -----  Contact: [phone]  Pt states that he did not receive the Rx for the cleaning solution for his ears. Pt states the Rx can be sent to and notify him through the portal   [id number] - [place] - [address]  [address] [address]  Phone: [phone] Fax: [phone]

## 2023-06-29 ENCOUNTER — HOSPITAL ENCOUNTER (OUTPATIENT)
Dept: CARDIOLOGY | Facility: CLINIC | Age: 63
Discharge: HOME | End: 2023-06-29
Payer: COMMERCIAL

## 2023-06-29 ENCOUNTER — OFFICE VISIT (OUTPATIENT)
Dept: ELECTROPHYSIOLOGY | Facility: CLINIC | Age: 63
End: 2023-06-29
Payer: COMMERCIAL

## 2023-06-29 VITALS
HEIGHT: 71 IN | SYSTOLIC BLOOD PRESSURE: 120 MMHG | DIASTOLIC BLOOD PRESSURE: 62 MMHG | WEIGHT: 222.25 LBS | HEART RATE: 81 BPM | BODY MASS INDEX: 31.11 KG/M2

## 2023-06-29 DIAGNOSIS — E11.9 TYPE 2 DIABETES MELLITUS WITHOUT COMPLICATION, WITHOUT LONG-TERM CURRENT USE OF INSULIN: ICD-10-CM

## 2023-06-29 DIAGNOSIS — I48.0 PAROXYSMAL ATRIAL FIBRILLATION: ICD-10-CM

## 2023-06-29 DIAGNOSIS — I49.3 FREQUENT PVCS: ICD-10-CM

## 2023-06-29 DIAGNOSIS — I50.42 CHRONIC COMBINED SYSTOLIC AND DIASTOLIC HEART FAILURE: ICD-10-CM

## 2023-06-29 DIAGNOSIS — I48.0 PAROXYSMAL ATRIAL FIBRILLATION: Primary | ICD-10-CM

## 2023-06-29 DIAGNOSIS — I10 ESSENTIAL HYPERTENSION: ICD-10-CM

## 2023-06-29 DIAGNOSIS — I47.29 NONSUSTAINED VENTRICULAR TACHYCARDIA: ICD-10-CM

## 2023-06-29 PROCEDURE — 3051F PR MOST RECENT HEMOGLOBIN A1C LEVEL 7.0 - < 8.0%: ICD-10-PCS | Mod: CPTII,S$GLB,, | Performed by: INTERNAL MEDICINE

## 2023-06-29 PROCEDURE — 3008F PR BODY MASS INDEX (BMI) DOCUMENTED: ICD-10-PCS | Mod: CPTII,S$GLB,, | Performed by: INTERNAL MEDICINE

## 2023-06-29 PROCEDURE — 3074F SYST BP LT 130 MM HG: CPT | Mod: CPTII,S$GLB,, | Performed by: INTERNAL MEDICINE

## 2023-06-29 PROCEDURE — 99999 PR PBB SHADOW E&M-EST. PATIENT-LVL IV: CPT | Mod: PBBFAC,,, | Performed by: INTERNAL MEDICINE

## 2023-06-29 PROCEDURE — 99999 PR PBB SHADOW E&M-EST. PATIENT-LVL IV: ICD-10-PCS | Mod: PBBFAC,,, | Performed by: INTERNAL MEDICINE

## 2023-06-29 PROCEDURE — 1160F RVW MEDS BY RX/DR IN RCRD: CPT | Mod: CPTII,S$GLB,, | Performed by: INTERNAL MEDICINE

## 2023-06-29 PROCEDURE — 93005 RHYTHM STRIP: ICD-10-PCS | Mod: S$GLB,,, | Performed by: INTERNAL MEDICINE

## 2023-06-29 PROCEDURE — 93010 ELECTROCARDIOGRAM REPORT: CPT | Mod: S$GLB,,, | Performed by: INTERNAL MEDICINE

## 2023-06-29 PROCEDURE — 99214 PR OFFICE/OUTPT VISIT, EST, LEVL IV, 30-39 MIN: ICD-10-PCS | Mod: S$GLB,,, | Performed by: INTERNAL MEDICINE

## 2023-06-29 PROCEDURE — 99214 OFFICE O/P EST MOD 30 MIN: CPT | Mod: S$GLB,,, | Performed by: INTERNAL MEDICINE

## 2023-06-29 PROCEDURE — 3061F NEG MICROALBUMINURIA REV: CPT | Mod: CPTII,S$GLB,, | Performed by: INTERNAL MEDICINE

## 2023-06-29 PROCEDURE — 3066F PR DOCUMENTATION OF TREATMENT FOR NEPHROPATHY: ICD-10-PCS | Mod: CPTII,S$GLB,, | Performed by: INTERNAL MEDICINE

## 2023-06-29 PROCEDURE — 93010 RHYTHM STRIP: ICD-10-PCS | Mod: S$GLB,,, | Performed by: INTERNAL MEDICINE

## 2023-06-29 PROCEDURE — 1159F MED LIST DOCD IN RCRD: CPT | Mod: CPTII,S$GLB,, | Performed by: INTERNAL MEDICINE

## 2023-06-29 PROCEDURE — 3078F DIAST BP <80 MM HG: CPT | Mod: CPTII,S$GLB,, | Performed by: INTERNAL MEDICINE

## 2023-06-29 PROCEDURE — 3051F HG A1C>EQUAL 7.0%<8.0%: CPT | Mod: CPTII,S$GLB,, | Performed by: INTERNAL MEDICINE

## 2023-06-29 PROCEDURE — 93005 ELECTROCARDIOGRAM TRACING: CPT | Mod: S$GLB,,, | Performed by: INTERNAL MEDICINE

## 2023-06-29 PROCEDURE — 3074F PR MOST RECENT SYSTOLIC BLOOD PRESSURE < 130 MM HG: ICD-10-PCS | Mod: CPTII,S$GLB,, | Performed by: INTERNAL MEDICINE

## 2023-06-29 PROCEDURE — 3061F PR NEG MICROALBUMINURIA RESULT DOCUMENTED/REVIEW: ICD-10-PCS | Mod: CPTII,S$GLB,, | Performed by: INTERNAL MEDICINE

## 2023-06-29 PROCEDURE — 3078F PR MOST RECENT DIASTOLIC BLOOD PRESSURE < 80 MM HG: ICD-10-PCS | Mod: CPTII,S$GLB,, | Performed by: INTERNAL MEDICINE

## 2023-06-29 PROCEDURE — 3008F BODY MASS INDEX DOCD: CPT | Mod: CPTII,S$GLB,, | Performed by: INTERNAL MEDICINE

## 2023-06-29 PROCEDURE — 1160F PR REVIEW ALL MEDS BY PRESCRIBER/CLIN PHARMACIST DOCUMENTED: ICD-10-PCS | Mod: CPTII,S$GLB,, | Performed by: INTERNAL MEDICINE

## 2023-06-29 PROCEDURE — 4010F PR ACE/ARB THEARPY RXD/TAKEN: ICD-10-PCS | Mod: CPTII,S$GLB,, | Performed by: INTERNAL MEDICINE

## 2023-06-29 PROCEDURE — 4010F ACE/ARB THERAPY RXD/TAKEN: CPT | Mod: CPTII,S$GLB,, | Performed by: INTERNAL MEDICINE

## 2023-06-29 PROCEDURE — 1159F PR MEDICATION LIST DOCUMENTED IN MEDICAL RECORD: ICD-10-PCS | Mod: CPTII,S$GLB,, | Performed by: INTERNAL MEDICINE

## 2023-06-29 PROCEDURE — 3066F NEPHROPATHY DOC TX: CPT | Mod: CPTII,S$GLB,, | Performed by: INTERNAL MEDICINE

## 2023-06-29 NOTE — PROGRESS NOTES
Subjective:    Patient ID:  Michael Slaughter Jr. is a 62 y.o. male who presents for evaluation of Atrial Fibrillation    Referring Cardiologist: Severo Palafox III, MD  Primary Care Physician: Michael Deal MD    HPI  Prior Hx:  I had the pleasure of seeing Mr. Sukhjinder Michaels today in our electrophysiology clinic in follow-up for his paroxysmal atrial fibrillation and systolic heart failure. As you are aware he is a pleasant 62 year-old man with hypertension and diabetes who was in his usually state of health until he developed heart failure symptoms for the past 1-2 months (dyspnea on exertion, orthopnea). An echocardiogram performed 11/30/2020 noted a systolic function of 25% with severe LA enlargement, moderate MR, and AF. His ECG from the same day noted sinus rhythm with a PVC (RBBB concordant at least through V3, superior axis as assessed in lead II). A stress echocardiogram in 2013 noted a LVEF of 45% without ischemic changes. He was initiated on eliquis, metoprolol and valsartan.    I reviewed available ECGs in Epic which show sinus rhythm.    At our first clinic visit 12/2020 we elected to monitor and see how much AF he was having prior to starting anti-arrhythmic drug therapy. We also discussed monitoring his EF with goal-directed medical therapy. If his EF improved would discuss ILR implant.    Mr. Slaughter wore a 14 day Bardy patch which noted 34 episodes of AF with a burden of 3% with an average ventricular rate of 107 bpm and longest episode lasting 2.6 hours. There were also 11 episodes of atrial flutter with 2:1 AV conduction lasting up to 22 minutes. There was an 11% PAC and 8% polyfocal PVC burden along with 5 episodes of nonsustained VT lasting up to 27 beats. We discussed on the phone and initiated amiodarone with plan for ECHO after he was on GDMT for 90 days and proceed with ICD implant if EF had not recovered. He is on metoprolol and entresto. A nuclear stress test from 1/2021 showed no ischemia/infarct.  He followed up with me 2/2021 and he felt much better. Plan was if EF recovered we would stop amiodarone and then evaluate for recurrent arrhythmias. If EF remained 35% or less then would recommend ICD implantation.    Mr. Slaughter returned for follow-up 5/2021. He saw Dr. Palafox 4/23/2021 and his metoprolol was increased. An ECHO from 3/2021 noted some improvement in LV function (up to 35%). He continued to feel well. He exercised regularly.  We discussed that hopefully his EF would normalize with medical therapy. If so would stop amiodarone and then evaluate for recurrent arrhythmias. If EF remained 35% or less then would recommend ICD implantation (if remained NYHA class II).     Mr. Slaughter returned for follow-up 11/2021. He feels great. Reports no heart failure symptoms. Does physical work, mows his lawn, and walks 5-10k steps daily without difficulty. He walks up the stairs (3 flights) at work at the [employer] without shortness of breath.    Mr. Slaughter returned for follow-up 2/2022. He felt great! No fatigue or dyspnea on exertion. No palpitations. We stopped amiodarone with plan to try sotalol for symptomatic arrhythmia recurrence (would require inpatient stay).    6/2022: Mr Slaughter returned for follow-up. ECHO 4/2022 noted a LVEF of 40%. He continued to work, mows his lawn, and was very physically active. Feels well. He has no symptomatic arrhythmia recurrences. Discussed that his LVEF recovered on metoprolol and entresto (40% 11/2021 and 4/2022 echo) and he felt great. Will repeat 14 day holter. If he has recurrence of symptomatic arrhythmias would try sotalol (would need an inpatient stay for initiation).     7/2022: Extended holter noted sinus rhythm with no AF. 17 beat run of nonsustained VT observed. Plan was not to initiate sotalol.    12/2022: Mr. Slaughter returned for follow-up. Reports some occasional gas-like chest pain episodes. Also reports occasional tripping while walking at work at the  [employer]. Feels great otherwise. No heart failure symptoms. Mows his own lawn. No palpitations.    Interim Hx:  Mr. Sukhjinder Michaels returns for follow-up. Only complaint is low back pain occurred by building a shed. Otherwise feels well.    My interpretation of today's in clinic electrocardiogram is sinus rhythm with narrow QRS and no ectopy.    Review of Systems   Constitutional: Negative for fever and malaise/fatigue.   HENT:  Negative for congestion and sore throat.    Eyes:  Negative for blurred vision and visual disturbance.   Cardiovascular:  Negative for dyspnea on exertion, irregular heartbeat, near-syncope and orthopnea.   Respiratory:  Negative for cough.    Hematologic/Lymphatic: Negative for bleeding problem. Does not bruise/bleed easily.   Skin: Negative.    Musculoskeletal: Negative.    Gastrointestinal:  Negative for bloating, abdominal pain, hematochezia and melena.   Neurological:  Negative for focal weakness.   Psychiatric/Behavioral: Negative.        Objective:    Physical Exam  Vitals reviewed.   Constitutional:       General: He is not in acute distress.     Appearance: He is well-developed. He is not diaphoretic.   HENT:      Head: Normocephalic and atraumatic.   Eyes:      General:         Right eye: No discharge.         Left eye: No discharge.      Conjunctiva/sclera: Conjunctivae normal.   Cardiovascular:      Rate and Rhythm: Normal rate and regular rhythm.      Heart sounds: No murmur heard.    No friction rub. No gallop.   Pulmonary:      Effort: Pulmonary effort is normal. No respiratory distress.      Breath sounds: Normal breath sounds. No wheezing or rales.   Abdominal:      General: Bowel sounds are normal. There is no distension.      Palpations: Abdomen is soft.      Tenderness: There is no abdominal tenderness.   Musculoskeletal:      Cervical back: Neck supple.   Skin:     General: Skin is warm and dry.   Neurological:      Mental Status: He is alert and oriented to person,  place, and time.   Psychiatric:         Behavior: Behavior normal.         Thought Content: Thought content normal.         Judgment: Judgment normal.         Assessment:       1. Paroxysmal atrial fibrillation    2. Frequent PVCs    3. Chronic combined systolic and diastolic heart failure    4. Nonsustained ventricular tachycardia    5. Essential hypertension    6. Type 2 diabetes mellitus without complication, without long-term current use of insulin         Plan:       In summary, Mr. Slaughter is a pleasant 62 year-old man with hypertension and previous nonischemic mild systolic cardiomyopathy with LVEF of 45% in 2013 now with symptomatic severe systolic heart failure with LVEF of 25% with low burden pAF but high burden PACs, 8% polyfocal PVCs and nonsustained VT. He was treated with amiodarone. This was stopped in February of 2022. His LVEF recovered on metoprolol and entresto (40% 11/2021 and 4/2022 echo). He feels great. If he has recurrence of symptomatic arrhythmias would try sotalol (would need an inpatient stay for initiation).     RTC in 6 months.     Thank you for allowing me to participate in the care of this patient. Please do not hesitate to call me with any questions or concerns.    Didier Gonzalez MD, PhD  Cardiac Electrophysiology

## 2023-07-10 ENCOUNTER — PATIENT MESSAGE (OUTPATIENT)
Dept: CARDIOLOGY | Facility: CLINIC | Age: 63
End: 2023-07-10
Payer: COMMERCIAL

## 2023-07-10 ENCOUNTER — PATIENT MESSAGE (OUTPATIENT)
Dept: INTERNAL MEDICINE | Facility: CLINIC | Age: 63
End: 2023-07-10
Payer: COMMERCIAL

## 2023-07-12 ENCOUNTER — PATIENT MESSAGE (OUTPATIENT)
Dept: ADMINISTRATIVE | Facility: OTHER | Age: 63
End: 2023-07-12
Payer: COMMERCIAL

## 2023-07-12 NOTE — PROGRESS NOTES
The patient location is: [place]  The chief complaint leading to consultation is: HF    Visit type: audiovisual    Face to Face time with patient: 22 minutes of total time spent on the encounter, which includes face to face time and non-face to face time preparing to see the patient (eg, review of tests), Obtaining and/or reviewing separately obtained history, Documenting clinical information in the electronic or other health record, Independently interpreting results (not separately reported) and communicating results to the patient/family/caregiver, or Care coordination (not separately reported).     Each patient to whom he or she provides medical services by telemedicine is:  (1) informed of the relationship between the physician and patient and the respective role of any other health care provider with respect to management of the patient; and (2) notified that he or she may decline to receive medical services by telemedicine and may withdraw from such care at any time.    Notes:          Cardiology Clinic Note  Reason for Visit: HFrEF    HPI:     Michael Slaughter Jr. is a 62 y.o. M, [facility]    He was diagnosed with covid earlier this week.   He is taking paxlovid. Aware of interaction with eliquis. Holding statin while on this med.    He reports that he is doing fine. He is on day 3 of paxlovid.  He has been cutting the grass, working in his garden.  Drinking lots of fluids.    He does have frequent urination.  He drinks one cup qd of green tea. Alcohol for events, not regular.  No LUTS.     He typically feels well, no CV limitations.  No LE edema, orthopnea, PND.  No symptoms of angina, arrhythmia, claudication.    No GI/ bleeding, nosebleeds.    Medical: HFrEF (20% with recovery to 40%), DM2, HTN, GERD  Surgical: Reviewed, as below.  Family: Reviewed, as below. No premature CAD, HF, SCD.  Social: Reviewed, as below. Former smoker, quit 35y ago. Works as  at the [employer].  Side effects/allergies: lisinopril (cough)    ROS:    As above.  PMH:     Past Medical History:   Diagnosis Date    Diabetes mellitus type II     GERD (gastroesophageal reflux disease)     Sinusitis      No past surgical history on file.  Allergies:     Review of patient's allergies indicates:   Allergen Reactions    Lisinopril      Other reaction(s): cough    Ozempic [semaglutide] Other (See Comments)     Intolerant/GI     Medications:     Current Outpatient Medications on File Prior to Visit   Medication Sig Dispense Refill    apixaban (ELIQUIS) 5 mg Tab Take 1 tablet (5 mg total) by mouth 2 (two) times daily. 60 tablet 10    atorvastatin (LIPITOR) 10 MG tablet Take 1 tablet (10 mg total) by mouth once daily. 90 tablet 3    blood sugar diagnostic Strp 1 strip by Misc.(Non-Drug; Combo Route) route 5 (five) times daily. Whatever insurance allows 400 each 12    blood-glucose meter kit Use as instructed 1 each 0    clotrimazole-betamethasone 1-0.05% (LOTRISONE) cream Apply behind right ear BID till skin problem resolved 15 g 0    empagliflozin (JARDIANCE) 10 mg tablet Take 1 tablet (10 mg total) by mouth once daily. 90 tablet 3    metFORMIN (GLUCOPHAGE) 1000 MG tablet TAKE 1 TABLET BY MOUTH TWICE A DAY WITH MEALS 180 tablet 1    metoprolol succinate (TOPROL-XL) 50 MG 24 hr tablet Take 1 tablet (50 mg total) by mouth once daily. 30 tablet 11    pantoprazole (PROTONIX) 40 MG tablet Take 1 tablet (40 mg total) by mouth once daily. 30 tablet 11    sacubitriL-valsartan (ENTRESTO)  mg per tablet Take 1 tablet by mouth 2 (two) times daily. 60 tablet 11    spironolactone (ALDACTONE) 25 MG tablet Take 1 tablet (25 mg total) by mouth once daily. 90 tablet 3     No current facility-administered medications on file prior to visit.     Social History:     Social History     Tobacco Use    Smoking status: Former     Types: Cigarettes    Smokeless tobacco: Never   Substance Use Topics    Alcohol use: Yes     Comment:  daily     Family History:     Family History   Problem Relation Age of Onset    Cancer Mother         breast    Birth defects Maternal Grandmother         brain tumor    Heart disease Neg Hx     Stroke Neg Hx     Amblyopia Neg Hx     Blindness Neg Hx     Cataracts Neg Hx     Glaucoma Neg Hx     Hypertension Neg Hx     Macular degeneration Neg Hx     Retinal detachment Neg Hx     Strabismus Neg Hx     Colon cancer Neg Hx     Esophageal cancer Neg Hx      Physical Exam:   There were no vitals taken for this visit.     Physical not exam was not performed due this encounter being a virtual visit.    Labs:     Blood Tests:  Lab Results   Component Value Date     (H) 11/23/2020     06/26/2023    K 4.9 06/26/2023     06/26/2023    CO2 25 06/26/2023    BUN 18 06/26/2023    CREATININE 1.4 06/26/2023     (H) 06/26/2023    HGBA1C 7.8 (H) 06/26/2023    AST 17 03/27/2023    ALT 34 03/27/2023    ALBUMIN 4.4 03/27/2023    PROT 7.7 03/27/2023    BILITOT 0.6 03/27/2023    WBC 7.49 03/27/2023    HGB 14.9 03/27/2023    HCT 45.8 03/27/2023    MCV 82 03/27/2023     03/27/2023    PSA 0.62 10/03/2022    TSH 1.480 10/03/2022       Lab Results   Component Value Date    CHOL 138 10/03/2022    HDL 41 10/03/2022    TRIG 116 10/03/2022       Lab Results   Component Value Date    LDLCALC 73.8 10/03/2022       Urine Tests:  Lab Results   Component Value Date    COLORU Colorless (A) 06/26/2023    APPEARANCEUA Clear 06/26/2023    PHUR 5.0 06/26/2023    SPECGRAV 1.020 06/26/2023    PROTEINUA Negative 06/26/2023    GLUCUA 4+ (A) 06/26/2023    KETONESU Negative 06/26/2023    BILIRUBINUA Negative 06/26/2023    OCCULTUA Negative 06/26/2023    NITRITE Negative 06/26/2023    UROBILINOGEN 0.2 09/18/2009    LEUKOCYTESUR Negative 06/26/2023    PROTEINURINE 17 (H) 09/21/2021    CREATRANDUR 167.0 03/27/2023    UTPCR 0.11 09/21/2021       Imaging:     Echocardiogram  TTE 4/20/22  The left ventricle is normal in size with  moderately decreased systolic function.  The estimated ejection fraction is 40%.  Left ventricular diastolic dysfunction.  Normal right ventricular size with low normal right ventricular systolic function.  Normal central venous pressure (3 mmHg).  The estimated PA systolic pressure is 17 mmHg.  Trivial lateral pericardial effusion. And under the RA.    TTE  11/18/21  The left ventricle is normal in size with mildly decreased systolic function. The estimated ejection fraction is 40%.  Normal right ventricular size with normal right ventricular systolic function.  Grade I left ventricular diastolic dysfunction.  The estimated PA systolic pressure is 20 mmHg.  Normal central venous pressure (3 mmHg).    TTE 3/1/21  Severe left atrial enlargement.  The left ventricle is normal in size with moderately decreased systolic function. The estimated ejection fraction is 30-35%  The quantitatively derived ejection fraction is 35%.  Grade I left ventricular diastolic dysfunction.  Normal right ventricular size with normal right ventricular systolic function.  Mild-to-moderate mitral regurgitation.  The estimated PA systolic pressure is 30 mm Hg.  Low central venous pressure.    TTE 11/30/20  Severe left atrial enlargement.  The left ventricle is normal in size with severely decreased systolic function. The estimated ejection fraction is 25%.  The quantitatively dervived ejection fraction is 20%.  Diastolic pattern consistent with atrial fibrillation observed.  Normal right ventricular size with normal right ventricular systolic function.  Moderate mitral regurgitation.  Normal central venous pressure (3 mmHg).  The estimated PA systolic pressure is 30 mmHg.    Stress testing  SPECT 1/14/21    Normal myocardial perfusion scan. There is no evidence of myocardial ischemia or infarction.    The gated perfusion images showed an ejection fraction of 36% at rest. The gated perfusion images showed an ejection fraction of 37% post  stress. Normal ejection fraction is greater than 51%.    There is moderate global hypokinesis at rest and stress.    LV cavity size is mildly enlarged at rest and mildly enlarged at stress.    The EKG portion of this study is abnormal but not diagnostic.    The patient reported no chest pain during the stress test.    There were no arrhythmias during stress.    There are no prior studies for comparison.    URIEL 13  CONCLUSIONS     1 - Mildly depressed resting left ventricular systolic function (EF 45%).     2 - Normal RV systolic function.     No evidence of stress induced myocardial ischemia.     Cath Lab  None    Other  Holter 22  Sinus rhythm with no AF  17 beat run of nonsustained VT with an average rate of 115 bpm.    Cardiac monitor 20  Paroxysmal AF/AFL totalling a 3.7% burden  Frequent PACs (11% burden)  Frequent multifocal PVCs (8% burden)  5 episodes of nonsustained VT lasting up to 27 beats in duration.    EK23 - NSR, NSTTA, R-alexander axis (personally reviewed)    Assessment:   Chronic combined systolic and diastolic heart failure    Essential hypertension    Frequent PVCs    Nonsustained ventricular tachycardia    Paroxysmal atrial fibrillation    Type 2 diabetes mellitus without complication, without long-term current use of insulin      Plan:     Chronic systolic heart failure  Euvolemic, stable    GDMT:   Entresto 97-103mg BID   Toprol 50mg qd   Spironolactone 25mg qd   Jardiance 10mg qd    Keep BP and HR log after completion of paxlovid.  Will try to titrate BB next    Continue lasix to 40mg prn for weight gain  Discussed low sodium diet, daily weights    Essential hypertension  BP at goal in digital HTN program  Continue current meds  Low salt diet, exercise, weight loss    Paroxysmal atrial fibrillation  14 day monitor with ~4% AF/AFl burden  [id number] 3 (CHF, DM, HTN)    Stable, asymptomatic  Continue eliquis 5mg BID, toprol    PVCs, NSVT  Stable    Type 2 diabetes mellitus  with complication, without long-term current use of insulin  A1c above goal, 7.8%    Signed:   Severo Palafox MD  Cardiology     Follow-up:     Future Appointments   Date Time Provider Department Center   7/13/2023  1:30 PM Michael Palafox III, MD [facility] CARDIO [facility]   9/25/2023  9:30 AM Michael Deal MD [facility] IM [facility]

## 2023-07-13 ENCOUNTER — PATIENT MESSAGE (OUTPATIENT)
Dept: INTERNAL MEDICINE | Facility: CLINIC | Age: 63
End: 2023-07-13
Payer: COMMERCIAL

## 2023-07-13 ENCOUNTER — OFFICE VISIT (OUTPATIENT)
Dept: CARDIOLOGY | Facility: CLINIC | Age: 63
End: 2023-07-13
Payer: COMMERCIAL

## 2023-07-13 DIAGNOSIS — E11.9 TYPE 2 DIABETES MELLITUS WITHOUT COMPLICATION, WITHOUT LONG-TERM CURRENT USE OF INSULIN: ICD-10-CM

## 2023-07-13 DIAGNOSIS — I47.29 NONSUSTAINED VENTRICULAR TACHYCARDIA: ICD-10-CM

## 2023-07-13 DIAGNOSIS — I10 ESSENTIAL HYPERTENSION: ICD-10-CM

## 2023-07-13 DIAGNOSIS — I50.42 CHRONIC COMBINED SYSTOLIC AND DIASTOLIC HEART FAILURE: Primary | ICD-10-CM

## 2023-07-13 DIAGNOSIS — I48.0 PAROXYSMAL ATRIAL FIBRILLATION: ICD-10-CM

## 2023-07-13 DIAGNOSIS — I49.3 FREQUENT PVCS: ICD-10-CM

## 2023-07-13 PROCEDURE — 3051F PR MOST RECENT HEMOGLOBIN A1C LEVEL 7.0 - < 8.0%: ICD-10-PCS | Mod: CPTII,95,, | Performed by: INTERNAL MEDICINE

## 2023-07-13 PROCEDURE — 3066F NEPHROPATHY DOC TX: CPT | Mod: CPTII,95,, | Performed by: INTERNAL MEDICINE

## 2023-07-13 PROCEDURE — 3061F PR NEG MICROALBUMINURIA RESULT DOCUMENTED/REVIEW: ICD-10-PCS | Mod: CPTII,95,, | Performed by: INTERNAL MEDICINE

## 2023-07-13 PROCEDURE — 4010F PR ACE/ARB THEARPY RXD/TAKEN: ICD-10-PCS | Mod: CPTII,95,, | Performed by: INTERNAL MEDICINE

## 2023-07-13 PROCEDURE — 99214 OFFICE O/P EST MOD 30 MIN: CPT | Mod: 95,,, | Performed by: INTERNAL MEDICINE

## 2023-07-13 PROCEDURE — 99214 PR OFFICE/OUTPT VISIT, EST, LEVL IV, 30-39 MIN: ICD-10-PCS | Mod: 95,,, | Performed by: INTERNAL MEDICINE

## 2023-07-13 PROCEDURE — 3061F NEG MICROALBUMINURIA REV: CPT | Mod: CPTII,95,, | Performed by: INTERNAL MEDICINE

## 2023-07-13 PROCEDURE — 3051F HG A1C>EQUAL 7.0%<8.0%: CPT | Mod: CPTII,95,, | Performed by: INTERNAL MEDICINE

## 2023-07-13 PROCEDURE — 3066F PR DOCUMENTATION OF TREATMENT FOR NEPHROPATHY: ICD-10-PCS | Mod: CPTII,95,, | Performed by: INTERNAL MEDICINE

## 2023-07-13 PROCEDURE — 4010F ACE/ARB THERAPY RXD/TAKEN: CPT | Mod: CPTII,95,, | Performed by: INTERNAL MEDICINE

## 2023-09-11 ENCOUNTER — PATIENT MESSAGE (OUTPATIENT)
Dept: ADMINISTRATIVE | Facility: HOSPITAL | Age: 63
End: 2023-09-11
Payer: COMMERCIAL

## 2023-09-25 ENCOUNTER — OFFICE VISIT (OUTPATIENT)
Dept: INTERNAL MEDICINE | Facility: CLINIC | Age: 63
End: 2023-09-25
Payer: COMMERCIAL

## 2023-09-25 ENCOUNTER — LAB VISIT (OUTPATIENT)
Dept: LAB | Facility: HOSPITAL | Age: 63
End: 2023-09-25
Attending: FAMILY MEDICINE
Payer: COMMERCIAL

## 2023-09-25 VITALS
WEIGHT: 218 LBS | BODY MASS INDEX: 30.52 KG/M2 | HEIGHT: 71 IN | OXYGEN SATURATION: 96 % | SYSTOLIC BLOOD PRESSURE: 122 MMHG | DIASTOLIC BLOOD PRESSURE: 78 MMHG | HEART RATE: 71 BPM

## 2023-09-25 DIAGNOSIS — E66.09 CLASS 1 OBESITY DUE TO EXCESS CALORIES WITH SERIOUS COMORBIDITY AND BODY MASS INDEX (BMI) OF 30.0 TO 30.9 IN ADULT: ICD-10-CM

## 2023-09-25 DIAGNOSIS — R35.0 BENIGN PROSTATIC HYPERPLASIA WITH URINARY FREQUENCY: ICD-10-CM

## 2023-09-25 DIAGNOSIS — Z29.11 NEED FOR RSV VACCINATION: ICD-10-CM

## 2023-09-25 DIAGNOSIS — Z23 NEEDS FLU SHOT: ICD-10-CM

## 2023-09-25 DIAGNOSIS — E11.9 TYPE 2 DIABETES MELLITUS WITHOUT COMPLICATION, WITHOUT LONG-TERM CURRENT USE OF INSULIN: ICD-10-CM

## 2023-09-25 DIAGNOSIS — N40.1 BENIGN PROSTATIC HYPERPLASIA WITH URINARY FREQUENCY: ICD-10-CM

## 2023-09-25 DIAGNOSIS — I10 ESSENTIAL HYPERTENSION: ICD-10-CM

## 2023-09-25 DIAGNOSIS — N52.9 ERECTILE DYSFUNCTION, UNSPECIFIED ERECTILE DYSFUNCTION TYPE: ICD-10-CM

## 2023-09-25 DIAGNOSIS — Z23 NEED FOR PNEUMOCOCCAL VACCINATION: ICD-10-CM

## 2023-09-25 DIAGNOSIS — R35.0 FREQUENT URINATION: ICD-10-CM

## 2023-09-25 DIAGNOSIS — Z00.00 HEALTHCARE MAINTENANCE: ICD-10-CM

## 2023-09-25 DIAGNOSIS — E11.9 TYPE 2 DIABETES MELLITUS WITHOUT COMPLICATION, WITHOUT LONG-TERM CURRENT USE OF INSULIN: Primary | ICD-10-CM

## 2023-09-25 DIAGNOSIS — Z23 NEED FOR COVID-19 VACCINE: ICD-10-CM

## 2023-09-25 LAB
ALBUMIN SERPL BCP-MCNC: 4.2 G/DL (ref 3.5–5.2)
ANION GAP SERPL CALC-SCNC: 11 MMOL/L (ref 8–16)
BACTERIA #/AREA URNS AUTO: NORMAL /HPF
BILIRUB UR QL STRIP: NEGATIVE
BUN SERPL-MCNC: 20 MG/DL (ref 8–23)
CALCIUM SERPL-MCNC: 10 MG/DL (ref 8.7–10.5)
CHLORIDE SERPL-SCNC: 106 MMOL/L (ref 95–110)
CHOLEST SERPL-MCNC: 161 MG/DL (ref 120–199)
CHOLEST/HDLC SERPL: 3.8 {RATIO} (ref 2–5)
CLARITY UR REFRACT.AUTO: CLEAR
CO2 SERPL-SCNC: 23 MMOL/L (ref 23–29)
COLOR UR AUTO: COLORLESS
COMPLEXED PSA SERPL-MCNC: 0.67 NG/ML (ref 0–4)
CREAT SERPL-MCNC: 1.2 MG/DL (ref 0.5–1.4)
EST. GFR  (NO RACE VARIABLE): >60 ML/MIN/1.73 M^2
ESTIMATED AVG GLUCOSE: 186 MG/DL (ref 68–131)
GLUCOSE SERPL-MCNC: 142 MG/DL (ref 70–110)
GLUCOSE UR QL STRIP: ABNORMAL
HBA1C MFR BLD: 8.1 % (ref 4–5.6)
HDLC SERPL-MCNC: 42 MG/DL (ref 40–75)
HDLC SERPL: 26.1 % (ref 20–50)
HGB UR QL STRIP: NEGATIVE
KETONES UR QL STRIP: NEGATIVE
LDLC SERPL CALC-MCNC: 88.2 MG/DL (ref 63–159)
LEUKOCYTE ESTERASE UR QL STRIP: NEGATIVE
MICROSCOPIC COMMENT: NORMAL
NITRITE UR QL STRIP: NEGATIVE
NONHDLC SERPL-MCNC: 119 MG/DL
PH UR STRIP: 5 [PH] (ref 5–8)
PHOSPHATE SERPL-MCNC: 3.9 MG/DL (ref 2.7–4.5)
POTASSIUM SERPL-SCNC: 4.3 MMOL/L (ref 3.5–5.1)
PROT UR QL STRIP: NEGATIVE
SODIUM SERPL-SCNC: 140 MMOL/L (ref 136–145)
SP GR UR STRIP: 1.02 (ref 1–1.03)
TRIGL SERPL-MCNC: 154 MG/DL (ref 30–150)
URN SPEC COLLECT METH UR: ABNORMAL
WBC #/AREA URNS AUTO: 0 /HPF (ref 0–5)
YEAST UR QL AUTO: NORMAL

## 2023-09-25 PROCEDURE — G0009 PNEUMOCOCCAL CONJUGATE VACCINE 20-VALENT: ICD-10-PCS | Mod: S$GLB,,, | Performed by: FAMILY MEDICINE

## 2023-09-25 PROCEDURE — 80069 RENAL FUNCTION PANEL: CPT | Performed by: FAMILY MEDICINE

## 2023-09-25 PROCEDURE — 3008F PR BODY MASS INDEX (BMI) DOCUMENTED: ICD-10-PCS | Mod: CPTII,S$GLB,, | Performed by: FAMILY MEDICINE

## 2023-09-25 PROCEDURE — 99999 PR PBB SHADOW E&M-EST. PATIENT-LVL V: ICD-10-PCS | Mod: PBBFAC,,, | Performed by: FAMILY MEDICINE

## 2023-09-25 PROCEDURE — 90677 PCV20 VACCINE IM: CPT | Mod: S$GLB,,, | Performed by: FAMILY MEDICINE

## 2023-09-25 PROCEDURE — 3078F DIAST BP <80 MM HG: CPT | Mod: CPTII,S$GLB,, | Performed by: FAMILY MEDICINE

## 2023-09-25 PROCEDURE — 3008F BODY MASS INDEX DOCD: CPT | Mod: CPTII,S$GLB,, | Performed by: FAMILY MEDICINE

## 2023-09-25 PROCEDURE — 3066F PR DOCUMENTATION OF TREATMENT FOR NEPHROPATHY: ICD-10-PCS | Mod: CPTII,S$GLB,, | Performed by: FAMILY MEDICINE

## 2023-09-25 PROCEDURE — G0008 FLU VACCINE - QUADRIVALENT - ADJUVANTED: ICD-10-PCS | Mod: S$GLB,,, | Performed by: FAMILY MEDICINE

## 2023-09-25 PROCEDURE — 99215 PR OFFICE/OUTPT VISIT, EST, LEVL V, 40-54 MIN: ICD-10-PCS | Mod: 25,S$GLB,, | Performed by: FAMILY MEDICINE

## 2023-09-25 PROCEDURE — 4010F PR ACE/ARB THEARPY RXD/TAKEN: ICD-10-PCS | Mod: CPTII,S$GLB,, | Performed by: FAMILY MEDICINE

## 2023-09-25 PROCEDURE — 1159F MED LIST DOCD IN RCRD: CPT | Mod: CPTII,S$GLB,, | Performed by: FAMILY MEDICINE

## 2023-09-25 PROCEDURE — 3066F NEPHROPATHY DOC TX: CPT | Mod: CPTII,S$GLB,, | Performed by: FAMILY MEDICINE

## 2023-09-25 PROCEDURE — 90694 FLU VACCINE - QUADRIVALENT - ADJUVANTED: ICD-10-PCS | Mod: S$GLB,,, | Performed by: FAMILY MEDICINE

## 2023-09-25 PROCEDURE — 4010F ACE/ARB THERAPY RXD/TAKEN: CPT | Mod: CPTII,S$GLB,, | Performed by: FAMILY MEDICINE

## 2023-09-25 PROCEDURE — 99215 OFFICE O/P EST HI 40 MIN: CPT | Mod: 25,S$GLB,, | Performed by: FAMILY MEDICINE

## 2023-09-25 PROCEDURE — 1101F PR PT FALLS ASSESS DOC 0-1 FALLS W/OUT INJ PAST YR: ICD-10-PCS | Mod: CPTII,S$GLB,, | Performed by: FAMILY MEDICINE

## 2023-09-25 PROCEDURE — G0009 ADMIN PNEUMOCOCCAL VACCINE: HCPCS | Mod: S$GLB,,, | Performed by: FAMILY MEDICINE

## 2023-09-25 PROCEDURE — 3078F PR MOST RECENT DIASTOLIC BLOOD PRESSURE < 80 MM HG: ICD-10-PCS | Mod: CPTII,S$GLB,, | Performed by: FAMILY MEDICINE

## 2023-09-25 PROCEDURE — 3288F FALL RISK ASSESSMENT DOCD: CPT | Mod: CPTII,S$GLB,, | Performed by: FAMILY MEDICINE

## 2023-09-25 PROCEDURE — 81001 URINALYSIS AUTO W/SCOPE: CPT | Performed by: FAMILY MEDICINE

## 2023-09-25 PROCEDURE — 3074F SYST BP LT 130 MM HG: CPT | Mod: CPTII,S$GLB,, | Performed by: FAMILY MEDICINE

## 2023-09-25 PROCEDURE — G0008 ADMIN INFLUENZA VIRUS VAC: HCPCS | Mod: S$GLB,,, | Performed by: FAMILY MEDICINE

## 2023-09-25 PROCEDURE — 3051F PR MOST RECENT HEMOGLOBIN A1C LEVEL 7.0 - < 8.0%: ICD-10-PCS | Mod: CPTII,S$GLB,, | Performed by: FAMILY MEDICINE

## 2023-09-25 PROCEDURE — 3051F HG A1C>EQUAL 7.0%<8.0%: CPT | Mod: CPTII,S$GLB,, | Performed by: FAMILY MEDICINE

## 2023-09-25 PROCEDURE — 3061F PR NEG MICROALBUMINURIA RESULT DOCUMENTED/REVIEW: ICD-10-PCS | Mod: CPTII,S$GLB,, | Performed by: FAMILY MEDICINE

## 2023-09-25 PROCEDURE — 3288F PR FALLS RISK ASSESSMENT DOCUMENTED: ICD-10-PCS | Mod: CPTII,S$GLB,, | Performed by: FAMILY MEDICINE

## 2023-09-25 PROCEDURE — 36415 COLL VENOUS BLD VENIPUNCTURE: CPT | Performed by: FAMILY MEDICINE

## 2023-09-25 PROCEDURE — 80061 LIPID PANEL: CPT | Performed by: FAMILY MEDICINE

## 2023-09-25 PROCEDURE — 90694 VACC AIIV4 NO PRSRV 0.5ML IM: CPT | Mod: S$GLB,,, | Performed by: FAMILY MEDICINE

## 2023-09-25 PROCEDURE — 99999 PR PBB SHADOW E&M-EST. PATIENT-LVL V: CPT | Mod: PBBFAC,,, | Performed by: FAMILY MEDICINE

## 2023-09-25 PROCEDURE — 84153 ASSAY OF PSA TOTAL: CPT | Performed by: FAMILY MEDICINE

## 2023-09-25 PROCEDURE — 83036 HEMOGLOBIN GLYCOSYLATED A1C: CPT | Performed by: FAMILY MEDICINE

## 2023-09-25 PROCEDURE — 3074F PR MOST RECENT SYSTOLIC BLOOD PRESSURE < 130 MM HG: ICD-10-PCS | Mod: CPTII,S$GLB,, | Performed by: FAMILY MEDICINE

## 2023-09-25 PROCEDURE — 1159F PR MEDICATION LIST DOCUMENTED IN MEDICAL RECORD: ICD-10-PCS | Mod: CPTII,S$GLB,, | Performed by: FAMILY MEDICINE

## 2023-09-25 PROCEDURE — 3061F NEG MICROALBUMINURIA REV: CPT | Mod: CPTII,S$GLB,, | Performed by: FAMILY MEDICINE

## 2023-09-25 PROCEDURE — 90677 PNEUMOCOCCAL CONJUGATE VACCINE 20-VALENT: ICD-10-PCS | Mod: S$GLB,,, | Performed by: FAMILY MEDICINE

## 2023-09-25 PROCEDURE — 1101F PT FALLS ASSESS-DOCD LE1/YR: CPT | Mod: CPTII,S$GLB,, | Performed by: FAMILY MEDICINE

## 2023-09-25 RX ORDER — SILDENAFIL 100 MG/1
100 TABLET, FILM COATED ORAL DAILY PRN
Qty: 10 TABLET | Status: SHIPPED | OUTPATIENT
Start: 2023-09-25 | End: 2023-12-13

## 2023-09-25 RX ORDER — TADALAFIL 20 MG/1
20 TABLET ORAL DAILY
Qty: 10 TABLET | Status: SHIPPED | OUTPATIENT
Start: 2023-09-25 | End: 2023-12-13

## 2023-09-25 RX ORDER — TAMSULOSIN HYDROCHLORIDE 0.4 MG/1
0.4 CAPSULE ORAL DAILY
Qty: 90 CAPSULE | Refills: 3 | Status: SHIPPED | OUTPATIENT
Start: 2023-09-25 | End: 2024-09-24

## 2023-09-25 NOTE — PATIENT INSTRUCTIONS
Flu shot and prevnar-20 vaccine today      In a few weeks - get the covid vaccine      In a few weeks after that get the RSV vaccine      Set up appt with new PCP for early next year (Jan/Feb)

## 2023-09-25 NOTE — PROGRESS NOTES
Subjective:       Patient ID: Michael Slaughter Jr. is a 63 y.o. male.    Chief Complaint: Follow-up    Patient is here for follow-up of their chronic diseases    Diabetes Management Status  Lab Results   Component Value Date    HGBA1C 7.8 (H) 06/26/2023    HGBA1C 8.5 (H) 03/27/2023    HGBA1C 7.4 (H) 10/03/2022      Statin: Taking  ACE/ARB: Not taking    Screening or Prevention Patient's value Goal Complete/Controlled?   HgA1C Testing and Control   Lab Results   Component Value Date    HGBA1C 7.8 (H) 06/26/2023      Annually/Less than 8% Yes   Lipid profile : 10/03/2022 Annually Yes   LDL control Lab Results   Component Value Date    LDLCALC 73.8 10/03/2022    Annually/Less than 100 mg/dl  Yes   Nephropathy screening Lab Results   Component Value Date    LABMICR 9.0 03/27/2023     Lab Results   Component Value Date    PROTEINUA Negative 06/26/2023     Lab Results   Component Value Date    UTPCR 0.11 09/21/2021      Annually Yes   Blood pressure BP Readings from Last 1 Encounters:   09/25/23 122/78    Less than 140/90 Yes   Dilated retinal exam : 03/27/2023 Annually Yes   Foot exam   : 09/26/2022 Annually Yes     Wt Readings from Last 9 Encounters:   09/25/23 98.9 kg (218 lb)   06/29/23 100.8 kg (222 lb 3.6 oz)   06/26/23 98.9 kg (218 lb)   05/22/23 100.4 kg (221 lb 5.5 oz)   03/27/23 101.6 kg (224 lb)   12/20/22 102.6 kg (226 lb 3.1 oz)   10/03/22 103.8 kg (228 lb 13.4 oz)   09/26/22 100.7 kg (222 lb)   06/16/22 100.6 kg (221 lb 12.5 oz)         Review of patient's allergies indicates:   Allergen Reactions    Lisinopril      Other reaction(s): cough    Ozempic [semaglutide] Other (See Comments)     Intolerant/GI       Social History     Tobacco Use    Smoking status: Former     Types: Cigarettes    Smokeless tobacco: Never   Substance Use Topics    Alcohol use: Yes     Comment: daily    Drug use: No       Family History   Problem Relation Age of Onset    Cancer Mother         breast    Birth defects Maternal  Grandmother         brain tumor    Heart disease Neg Hx     Stroke Neg Hx     Amblyopia Neg Hx     Blindness Neg Hx     Cataracts Neg Hx     Glaucoma Neg Hx     Hypertension Neg Hx     Macular degeneration Neg Hx     Retinal detachment Neg Hx     Strabismus Neg Hx     Colon cancer Neg Hx     Esophageal cancer Neg Hx        No past surgical history on file.    Patient Active Problem List   Diagnosis    Essential hypertension    Type 2 diabetes mellitus without complication, without long-term current use of insulin    GERD (gastroesophageal reflux disease)    Sinusitis    ED (erectile dysfunction)    Colon cancer screening    Paroxysmal atrial fibrillation    Chronic combined systolic and diastolic heart failure    Anticoagulated    Frequent PVCs    Nonsustained ventricular tachycardia    Class 1 obesity due to excess calories with serious comorbidity and body mass index (BMI) of 30.0 to 30.9 in adult       Current Outpatient Medications on File Prior to Visit   Medication Sig Dispense Refill    apixaban (ELIQUIS) 5 mg Tab Take 1 tablet (5 mg total) by mouth 2 (two) times daily. 60 tablet 10    atorvastatin (LIPITOR) 10 MG tablet Take 1 tablet (10 mg total) by mouth once daily. 90 tablet 3    blood sugar diagnostic Strp 1 strip by Misc.(Non-Drug; Combo Route) route 5 (five) times daily. Whatever insurance allows 400 each 12    blood-glucose meter kit Use as instructed 1 each 0    clotrimazole-betamethasone 1-0.05% (LOTRISONE) cream Apply behind right ear BID till skin problem resolved 15 g 0    empagliflozin (JARDIANCE) 25 mg tablet Take 1 tablet (25 mg total) by mouth once daily. 90 tablet 3    metFORMIN (GLUCOPHAGE) 1000 MG tablet TAKE 1 TABLET BY MOUTH TWICE A DAY WITH MEALS 180 tablet 1    metoprolol succinate (TOPROL-XL) 50 MG 24 hr tablet Take 1 tablet (50 mg total) by mouth once daily. 30 tablet 11    pantoprazole (PROTONIX) 40 MG tablet Take 1 tablet (40 mg total) by mouth once daily. 30 tablet 11     "sacubitriL-valsartan (ENTRESTO)  mg per tablet Take 1 tablet by mouth 2 (two) times daily. 60 tablet 11    spironolactone (ALDACTONE) 25 MG tablet Take 1 tablet (25 mg total) by mouth once daily. 90 tablet 3     No current facility-administered medications on file prior to visit.           Review of Systems   Constitutional:  Negative for chills and fever.   HENT:  Negative for ear pain.    Eyes:  Negative for pain.   Respiratory:  Negative for chest tightness.    Cardiovascular:  Negative for chest pain.   Gastrointestinal:  Negative for abdominal pain.   Genitourinary:  Positive for frequency. Negative for flank pain.   Musculoskeletal:  Negative for gait problem.   Neurological:  Negative for syncope.   Psychiatric/Behavioral:  Negative for behavioral problems.        Objective:    /78 (BP Location: Right arm, Patient Position: Sitting, BP Method: Medium (Manual))   Pulse 71   Ht 5' 11" (1.803 m)   Wt 98.9 kg (218 lb)   SpO2 96%   BMI 30.40 kg/m²     Physical Exam  Constitutional:       Appearance: He is well-developed.   HENT:      Head: Normocephalic and atraumatic.   Cardiovascular:      Rate and Rhythm: Normal rate.      Heart sounds: Normal heart sounds.   Pulmonary:      Effort: No respiratory distress.      Breath sounds: Normal breath sounds. No wheezing or rales.   Abdominal:      Palpations: Abdomen is soft.   Musculoskeletal:      Cervical back: Neck supple.   Skin:     General: Skin is dry.   Neurological:      Mental Status: He is alert.   Psychiatric:         Behavior: Behavior normal.         Assessment:       1. Type 2 diabetes mellitus without complication, without long-term current use of insulin    2. Class 1 obesity due to excess calories with serious comorbidity and body mass index (BMI) of 30.0 to 30.9 in adult    3. Essential hypertension    4. Healthcare maintenance    5. Need for pneumococcal vaccination    6. Needs flu shot    7. Need for RSV vaccination    8. Need for " COVID-19 vaccine    9. Frequent urination    10. Benign prostatic hyperplasia with urinary frequency    11. Erectile dysfunction, unspecified erectile dysfunction type        Plan:       Michael was seen today for follow-up.    Diagnoses and all orders for this visit:    Type 2 diabetes mellitus without complication, without long-term current use of insulin  -monitoring today   -     Renal Function Panel; Future  -     Lipid Panel; Future  -     Hemoglobin A1C; Future    Class 1 obesity due to excess calories with serious comorbidity and body mass index (BMI) of 30.0 to 30.9 in adult  -discussed diet and exercise    Essential hypertension  -controlled, stable, no change in meds   -     Renal Function Panel; Future  -     Lipid Panel; Future    Healthcare maintenance  -      AAA Screening; Future    Need for pneumococcal vaccination  -Prevnar-20 today    Needs flu shot  -today    Need for RSV vaccination  -do a few weeks after covid vaccine    Need for COVID-19 vaccine  -in a few weeks    Frequent urination  -     Urinalysis, Reflex to Urine Culture Urine, Clean Catch    Benign prostatic hyperplasia with urinary frequency (perhaps due to jardiance?)  -     PSA, SCREENING; Future  -     tamsulosin (FLOMAX) 0.4 mg Cap; Take 1 capsule (0.4 mg total) by mouth once daily.    Erectile dysfunction, unspecified erectile dysfunction type  He would like both to try - would never use on same days  -     tadalafiL (CIALIS) 20 MG Tab; Take 1 tablet (20 mg total) by mouth once daily.  -     sildenafiL (VIAGRA) 100 MG tablet; Take 1 tablet (100 mg total) by mouth daily as needed for Erectile Dysfunction.    Follow up in about 4 months (around 1/25/2024) for est care with new PCP, f/u DM.

## 2023-09-26 ENCOUNTER — PATIENT MESSAGE (OUTPATIENT)
Dept: INTERNAL MEDICINE | Facility: CLINIC | Age: 63
End: 2023-09-26
Payer: COMMERCIAL

## 2023-09-27 ENCOUNTER — HOSPITAL ENCOUNTER (OUTPATIENT)
Dept: RADIOLOGY | Facility: HOSPITAL | Age: 63
Discharge: HOME | End: 2023-09-27
Attending: FAMILY MEDICINE
Payer: COMMERCIAL

## 2023-09-27 DIAGNOSIS — Z00.00 HEALTHCARE MAINTENANCE: ICD-10-CM

## 2023-09-27 PROCEDURE — 76775 US ABDOMINAL AORTA: ICD-10-PCS | Mod: 26,,, | Performed by: STUDENT IN AN ORGANIZED HEALTH CARE EDUCATION/TRAINING PROGRAM

## 2023-09-27 PROCEDURE — 76775 US EXAM ABDO BACK WALL LIM: CPT | Mod: 26,,, | Performed by: STUDENT IN AN ORGANIZED HEALTH CARE EDUCATION/TRAINING PROGRAM

## 2023-09-27 PROCEDURE — 76775 US EXAM ABDO BACK WALL LIM: CPT | Mod: TC

## 2023-10-12 RX ORDER — METFORMIN HYDROCHLORIDE 1000 MG/1
1000 TABLET ORAL 2 TIMES DAILY WITH MEALS
Qty: 180 TABLET | Refills: 1 | Status: SHIPPED | OUTPATIENT
Start: 2023-10-12

## 2023-10-12 NOTE — TELEPHONE ENCOUNTER
No care due was identified.  [facility] Embedded Care Due Messages. Reference number: [id number].   10/12/2023 12:25:30 AM CDT

## 2023-10-12 NOTE — TELEPHONE ENCOUNTER
Refill Decision Note   Michael Slaughter  is requesting a refill authorization.  Brief Assessment and Rationale for Refill:  Approve     Medication Therapy Plan:       Medication Reconciliation Completed: No    Comments:     No Care Gaps recommended.     Note composed:8:53 AM 10/12/2023

## 2023-12-04 RX ORDER — METOPROLOL SUCCINATE 50 MG/1
50 TABLET, EXTENDED RELEASE ORAL
Qty: 30 TABLET | Refills: 11 | Status: SHIPPED | OUTPATIENT
Start: 2023-12-04

## 2023-12-11 ENCOUNTER — PATIENT MESSAGE (OUTPATIENT)
Dept: ADMINISTRATIVE | Facility: HOSPITAL | Age: 63
End: 2023-12-11
Payer: COMMERCIAL

## 2023-12-13 ENCOUNTER — LAB VISIT (OUTPATIENT)
Dept: LAB | Facility: HOSPITAL | Age: 63
End: 2023-12-13
Attending: FAMILY MEDICINE
Payer: COMMERCIAL

## 2023-12-13 ENCOUNTER — OFFICE VISIT (OUTPATIENT)
Dept: INTERNAL MEDICINE | Facility: CLINIC | Age: 63
End: 2023-12-13
Payer: COMMERCIAL

## 2023-12-13 VITALS
DIASTOLIC BLOOD PRESSURE: 70 MMHG | HEIGHT: 71 IN | SYSTOLIC BLOOD PRESSURE: 120 MMHG | BODY MASS INDEX: 30.9 KG/M2 | OXYGEN SATURATION: 97 % | HEART RATE: 84 BPM | WEIGHT: 220.69 LBS

## 2023-12-13 DIAGNOSIS — R35.1 BENIGN PROSTATIC HYPERPLASIA WITH NOCTURIA: ICD-10-CM

## 2023-12-13 DIAGNOSIS — E11.9 TYPE 2 DIABETES MELLITUS WITHOUT COMPLICATION, WITHOUT LONG-TERM CURRENT USE OF INSULIN: ICD-10-CM

## 2023-12-13 DIAGNOSIS — N40.1 BENIGN PROSTATIC HYPERPLASIA WITH NOCTURIA: ICD-10-CM

## 2023-12-13 DIAGNOSIS — I10 ESSENTIAL HYPERTENSION: ICD-10-CM

## 2023-12-13 DIAGNOSIS — I48.0 PAROXYSMAL ATRIAL FIBRILLATION: ICD-10-CM

## 2023-12-13 DIAGNOSIS — H04.202: ICD-10-CM

## 2023-12-13 DIAGNOSIS — I50.42 CHRONIC COMBINED SYSTOLIC AND DIASTOLIC HEART FAILURE: ICD-10-CM

## 2023-12-13 DIAGNOSIS — Z76.89 ENCOUNTER TO ESTABLISH CARE WITH NEW DOCTOR: Primary | ICD-10-CM

## 2023-12-13 DIAGNOSIS — K21.9 GASTROESOPHAGEAL REFLUX DISEASE WITHOUT ESOPHAGITIS: ICD-10-CM

## 2023-12-13 DIAGNOSIS — Z23 NEED FOR VACCINATION: ICD-10-CM

## 2023-12-13 LAB
ALBUMIN SERPL BCP-MCNC: 4.3 G/DL (ref 3.5–5.2)
ALP SERPL-CCNC: 53 U/L (ref 55–135)
ALT SERPL W/O P-5'-P-CCNC: 19 U/L (ref 10–44)
ANION GAP SERPL CALC-SCNC: 12 MMOL/L (ref 8–16)
AST SERPL-CCNC: 13 U/L (ref 10–40)
BILIRUB SERPL-MCNC: 0.5 MG/DL (ref 0.1–1)
BUN SERPL-MCNC: 15 MG/DL (ref 8–23)
CALCIUM SERPL-MCNC: 9.8 MG/DL (ref 8.7–10.5)
CHLORIDE SERPL-SCNC: 102 MMOL/L (ref 95–110)
CO2 SERPL-SCNC: 25 MMOL/L (ref 23–29)
CREAT SERPL-MCNC: 1.6 MG/DL (ref 0.5–1.4)
EST. GFR  (NO RACE VARIABLE): 47.5 ML/MIN/1.73 M^2
ESTIMATED AVG GLUCOSE: 206 MG/DL (ref 68–131)
GLUCOSE SERPL-MCNC: 137 MG/DL (ref 70–110)
HBA1C MFR BLD: 8.8 % (ref 4–5.6)
POTASSIUM SERPL-SCNC: 4.4 MMOL/L (ref 3.5–5.1)
PROT SERPL-MCNC: 7.7 G/DL (ref 6–8.4)
SODIUM SERPL-SCNC: 139 MMOL/L (ref 136–145)

## 2023-12-13 PROCEDURE — 3066F PR DOCUMENTATION OF TREATMENT FOR NEPHROPATHY: ICD-10-PCS | Mod: CPTII,S$GLB,, | Performed by: FAMILY MEDICINE

## 2023-12-13 PROCEDURE — 1159F PR MEDICATION LIST DOCUMENTED IN MEDICAL RECORD: ICD-10-PCS | Mod: CPTII,S$GLB,, | Performed by: FAMILY MEDICINE

## 2023-12-13 PROCEDURE — 36415 COLL VENOUS BLD VENIPUNCTURE: CPT | Performed by: FAMILY MEDICINE

## 2023-12-13 PROCEDURE — 99214 PR OFFICE/OUTPT VISIT, EST, LEVL IV, 30-39 MIN: ICD-10-PCS | Mod: S$GLB,,, | Performed by: FAMILY MEDICINE

## 2023-12-13 PROCEDURE — 99214 OFFICE O/P EST MOD 30 MIN: CPT | Mod: S$GLB,,, | Performed by: FAMILY MEDICINE

## 2023-12-13 PROCEDURE — 1101F PT FALLS ASSESS-DOCD LE1/YR: CPT | Mod: CPTII,S$GLB,, | Performed by: FAMILY MEDICINE

## 2023-12-13 PROCEDURE — 1101F PR PT FALLS ASSESS DOC 0-1 FALLS W/OUT INJ PAST YR: ICD-10-PCS | Mod: CPTII,S$GLB,, | Performed by: FAMILY MEDICINE

## 2023-12-13 PROCEDURE — 1160F RVW MEDS BY RX/DR IN RCRD: CPT | Mod: CPTII,S$GLB,, | Performed by: FAMILY MEDICINE

## 2023-12-13 PROCEDURE — 3074F SYST BP LT 130 MM HG: CPT | Mod: CPTII,S$GLB,, | Performed by: FAMILY MEDICINE

## 2023-12-13 PROCEDURE — 3052F PR MOST RECENT HEMOGLOBIN A1C LEVEL 8.0 - < 9.0%: ICD-10-PCS | Mod: CPTII,S$GLB,, | Performed by: FAMILY MEDICINE

## 2023-12-13 PROCEDURE — 4010F PR ACE/ARB THEARPY RXD/TAKEN: ICD-10-PCS | Mod: CPTII,S$GLB,, | Performed by: FAMILY MEDICINE

## 2023-12-13 PROCEDURE — 3061F NEG MICROALBUMINURIA REV: CPT | Mod: CPTII,S$GLB,, | Performed by: FAMILY MEDICINE

## 2023-12-13 PROCEDURE — 3061F PR NEG MICROALBUMINURIA RESULT DOCUMENTED/REVIEW: ICD-10-PCS | Mod: CPTII,S$GLB,, | Performed by: FAMILY MEDICINE

## 2023-12-13 PROCEDURE — 3078F PR MOST RECENT DIASTOLIC BLOOD PRESSURE < 80 MM HG: ICD-10-PCS | Mod: CPTII,S$GLB,, | Performed by: FAMILY MEDICINE

## 2023-12-13 PROCEDURE — 4010F ACE/ARB THERAPY RXD/TAKEN: CPT | Mod: CPTII,S$GLB,, | Performed by: FAMILY MEDICINE

## 2023-12-13 PROCEDURE — 1160F PR REVIEW ALL MEDS BY PRESCRIBER/CLIN PHARMACIST DOCUMENTED: ICD-10-PCS | Mod: CPTII,S$GLB,, | Performed by: FAMILY MEDICINE

## 2023-12-13 PROCEDURE — 3074F PR MOST RECENT SYSTOLIC BLOOD PRESSURE < 130 MM HG: ICD-10-PCS | Mod: CPTII,S$GLB,, | Performed by: FAMILY MEDICINE

## 2023-12-13 PROCEDURE — 1159F MED LIST DOCD IN RCRD: CPT | Mod: CPTII,S$GLB,, | Performed by: FAMILY MEDICINE

## 2023-12-13 PROCEDURE — 3008F PR BODY MASS INDEX (BMI) DOCUMENTED: ICD-10-PCS | Mod: CPTII,S$GLB,, | Performed by: FAMILY MEDICINE

## 2023-12-13 PROCEDURE — 3008F BODY MASS INDEX DOCD: CPT | Mod: CPTII,S$GLB,, | Performed by: FAMILY MEDICINE

## 2023-12-13 PROCEDURE — 80053 COMPREHEN METABOLIC PANEL: CPT | Performed by: FAMILY MEDICINE

## 2023-12-13 PROCEDURE — 99999 PR PBB SHADOW E&M-EST. PATIENT-LVL V: CPT | Mod: PBBFAC,,, | Performed by: FAMILY MEDICINE

## 2023-12-13 PROCEDURE — 3066F NEPHROPATHY DOC TX: CPT | Mod: CPTII,S$GLB,, | Performed by: FAMILY MEDICINE

## 2023-12-13 PROCEDURE — 3078F DIAST BP <80 MM HG: CPT | Mod: CPTII,S$GLB,, | Performed by: FAMILY MEDICINE

## 2023-12-13 PROCEDURE — 3288F PR FALLS RISK ASSESSMENT DOCUMENTED: ICD-10-PCS | Mod: CPTII,S$GLB,, | Performed by: FAMILY MEDICINE

## 2023-12-13 PROCEDURE — 3052F HG A1C>EQUAL 8.0%<EQUAL 9.0%: CPT | Mod: CPTII,S$GLB,, | Performed by: FAMILY MEDICINE

## 2023-12-13 PROCEDURE — 99999 PR PBB SHADOW E&M-EST. PATIENT-LVL V: ICD-10-PCS | Mod: PBBFAC,,, | Performed by: FAMILY MEDICINE

## 2023-12-13 PROCEDURE — 83036 HEMOGLOBIN GLYCOSYLATED A1C: CPT | Performed by: FAMILY MEDICINE

## 2023-12-13 PROCEDURE — 3288F FALL RISK ASSESSMENT DOCD: CPT | Mod: CPTII,S$GLB,, | Performed by: FAMILY MEDICINE

## 2023-12-13 NOTE — PROGRESS NOTES
Subjective:     Patient ID: Michael Slaughter Jr. is a 63 y.o. male.   Chief Complaint: Establish Care    HPI:  Patient presents to establish care.  Patient is due for diabetic f/u. Fasting labs were accomplished in Sept 2023.    #T2DM  Compliant with jardiance and metformin. He is urinating a lot since starting jardiance. Prior urine studies (most recent Sept 2023) have all been within expected parameters. He is UTD for his diabetic eye exam a/o Mar 2023. Most recent A1c in September was 8.1%, an uptrend from before.    #Afib and CHF  Sees cardiology and electrophysiology for these conditions. Compliant with metoprolol, spironolactone, entresto. Taking eliquis for anticoagulation. Reports he is subjectively doing very well. Energy has improved. He has not had any fluid retention. Reports cardiology is pleased with his progress.    Acutely reporting sx of watery eye on the left. Has been occurring intermittently. No itching or pain. No redness. Reports that a day or two ago when he went to wipe the tears away he noticed a crust that came off.    Review of Problems & History:  Patient Active Problem List   Diagnosis    Essential hypertension    Type 2 diabetes mellitus without complication, without long-term current use of insulin    GERD (gastroesophageal reflux disease)    Sinusitis    ED (erectile dysfunction)    Colon cancer screening    Paroxysmal atrial fibrillation    Chronic combined systolic and diastolic heart failure    Anticoagulated    Frequent PVCs    Nonsustained ventricular tachycardia    Class 1 obesity due to excess calories with serious comorbidity and body mass index (BMI) of 30.0 to 30.9 in adult    Benign prostatic hyperplasia with nocturia      Past Medical History:   Diagnosis Date    Diabetes mellitus type II     GERD (gastroesophageal reflux disease)     Sinusitis       History reviewed. No pertinent surgical history.   Social History     Socioeconomic History    Marital status:     Occupational History     Employer: [employer]   Tobacco Use    Smoking status: Former     Types: Cigarettes    Smokeless tobacco: Never   Substance and Sexual Activity    Alcohol use: Yes     Comment: daily    Drug use: No     Social Determinants of Health     Financial Resource Strain: Low Risk  (7/13/2023)    Overall Financial Resource Strain (CARDIA)     Difficulty of Paying Living Expenses: Not hard at all   Food Insecurity: No Food Insecurity (7/13/2023)    Hunger Vital Sign     Worried About Running Out of Food in the Last Year: Never true     Ran Out of Food in the Last Year: Never true   Transportation Needs: No Transportation Needs (7/13/2023)    PRAPARE - Transportation     Lack of Transportation (Medical): No     Lack of Transportation (Non-Medical): No   Physical Activity: Unknown (7/13/2023)    Exercise Vital Sign     Days of Exercise per Week: 4 days   Stress: Stress Concern Present (7/13/2023)    [employer] [place] [employer] - Occupational Stress Questionnaire     Feeling of Stress : To some extent   Social Connections: Unknown (7/13/2023)    Social Connection and Isolation Panel [NHANES]     Frequency of Communication with Friends and Family: Three times a week     Frequency of Social Gatherings with Friends and Family: Once a week     Active Member of Clubs or Organizations: Yes     Attends Club or Organization Meetings: 1 to 4 times per year     Marital Status:    Housing Stability: Low Risk  (7/13/2023)    Housing Stability Vital Sign     Unable to Pay for Housing in the Last Year: No     Number of Places Lived in the Last Year: 1     Unstable Housing in the Last Year: No      Health Maintenance:  Colon Cancer Screening  Previous study completed on 07/2014  Type of Study: Colonoscopy  Findings: Normal  Clearance: 10 years  Prostate Cancer Screening  Patient is UTD on screening  ASCVD Risk  The 10-year ASCVD risk score (Joyce VERDE, et al., 2019) is: 25.3%    Values used  to calculate the score:      Age: 63 years      Sex: Male      Is Non- : Yes      Diabetic: Yes      Tobacco smoker: No      Systolic Blood Pressure: 120 mmHg      Is BP treated: Yes      HDL Cholesterol: 42 mg/dL      Total Cholesterol: 161 mg/dL   Lab Results   Component Value Date    CHOL 161 09/25/2023    HDL 42 09/25/2023    TRIG 154 (H) 09/25/2023     Statin rx: yes    Vaccines  Seasonal Influenza: UTD  COVID-19: Due for booster  RSV: Due for primary series  Tetanus: UTD  Shingles: UTD  Pneumococcal: UTD    Medication Review:    Current Outpatient Medications:     apixaban (ELIQUIS) 5 mg Tab, Take 1 tablet (5 mg total) by mouth 2 (two) times daily., Disp: 60 tablet, Rfl: 10    atorvastatin (LIPITOR) 10 MG tablet, Take 1 tablet (10 mg total) by mouth once daily., Disp: 90 tablet, Rfl: 3    empagliflozin (JARDIANCE) 25 mg tablet, Take 1 tablet (25 mg total) by mouth once daily., Disp: 90 tablet, Rfl: 3    metFORMIN (GLUCOPHAGE) 1000 MG tablet, Take 1 tablet (1,000 mg total) by mouth 2 (two) times daily with meals., Disp: 180 tablet, Rfl: 1    metoprolol succinate (TOPROL-XL) 50 MG 24 hr tablet, TAKE 1 TABLET BY MOUTH EVERY DAY, Disp: 30 tablet, Rfl: 11    pantoprazole (PROTONIX) 40 MG tablet, Take 1 tablet (40 mg total) by mouth once daily., Disp: 30 tablet, Rfl: 11    sacubitriL-valsartan (ENTRESTO)  mg per tablet, Take 1 tablet by mouth 2 (two) times daily., Disp: 60 tablet, Rfl: 11    spironolactone (ALDACTONE) 25 MG tablet, Take 1 tablet (25 mg total) by mouth once daily., Disp: 90 tablet, Rfl: 3    tamsulosin (FLOMAX) 0.4 mg Cap, Take 1 capsule (0.4 mg total) by mouth once daily., Disp: 90 capsule, Rfl: 3    blood sugar diagnostic Strp, 1 strip by Misc.(Non-Drug; Combo Route) route 5 (five) times daily. Whatever insurance allows, Disp: 400 each, Rfl: 12    blood-glucose meter kit, Use as instructed, Disp: 1 each, Rfl: 0    RSVPreF3 antigen-AS01E, PF, (AREXVY) 120 mcg/0.5 mL  "SusR vaccine, Inject 0.5 mLs into the muscle once. for 1 dose, Disp: 1 each, Rfl: 0    sars-cov-2, covid-19, (SPIKEVAX, MODERNA,, 12YRS AND UP 2023,) 50 mcg/0.5 mL injection, Inject 0.5 mLs into the muscle once. Inject 0.5 mL into the muscle once. for 1 dose, Disp: 0.5 mL, Rfl: 0     Review of Systems   Constitutional:  Negative for chills, fatigue and fever.   HENT:  Negative for nasal congestion, ear pain, postnasal drip and sore throat.    Eyes:  Positive for discharge. Negative for pain, redness, itching, visual disturbance and eye dryness.   Respiratory:  Negative for cough, shortness of breath and wheezing.    Cardiovascular:  Negative for chest pain, palpitations and leg swelling.   Gastrointestinal:  Negative for abdominal pain, change in bowel habit, constipation, diarrhea, nausea and vomiting.   Genitourinary:  Positive for frequency. Negative for dysuria and hematuria.   Musculoskeletal:  Negative for back pain, leg pain and neck pain.   Neurological:  Negative for dizziness, numbness and headaches.   Hematological:  Negative for adenopathy.   Psychiatric/Behavioral:  Negative for dysphoric mood, sleep disturbance and suicidal ideas. The patient is not nervous/anxious.           Objective:      Vitals:    12/13/23 1411   BP: 120/70   BP Location: Left arm   Patient Position: Sitting   BP Method: Medium (Manual)   Pulse: 84   SpO2: 97%   Weight: 100.1 kg (220 lb 10.9 oz)   Height: 5' 11" (1.803 m)      Physical Exam  Vitals and nursing note reviewed.   Constitutional:       General: He is not in acute distress.     Appearance: Normal appearance. He is not ill-appearing.   HENT:      Head: Normocephalic and atraumatic.      Right Ear: Tympanic membrane, ear canal and external ear normal. There is no impacted cerumen.      Left Ear: Tympanic membrane, ear canal and external ear normal. There is no impacted cerumen.      Nose: Nose normal. No congestion or rhinorrhea.      Mouth/Throat:      Mouth: Mucous " membranes are moist.      Pharynx: Oropharynx is clear. No oropharyngeal exudate or posterior oropharyngeal erythema.   Eyes:      General: Lids are normal. Vision grossly intact. No scleral icterus.        Right eye: No foreign body or discharge.         Left eye: No foreign body or discharge.      Conjunctiva/sclera: Conjunctivae normal.      Right eye: Right conjunctiva is not injected. No chemosis or exudate.     Left eye: Left conjunctiva is not injected. No chemosis or exudate.  Neck:      Thyroid: No thyroid mass, thyromegaly or thyroid tenderness.   Cardiovascular:      Rate and Rhythm: Normal rate and regular rhythm.      Pulses: Normal pulses.      Heart sounds: Normal heart sounds. No murmur heard.     No friction rub. No gallop.   Pulmonary:      Effort: Pulmonary effort is normal. No respiratory distress.      Breath sounds: Normal breath sounds. No wheezing, rhonchi or rales.   Abdominal:      General: Abdomen is flat. Bowel sounds are normal.      Palpations: Abdomen is soft.      Tenderness: There is no abdominal tenderness.   Musculoskeletal:         General: No swelling or deformity. Normal range of motion.      Cervical back: Normal range of motion and neck supple. No tenderness.      Right lower leg: No edema.      Left lower leg: No edema.   Feet:      Right foot:      Protective Sensation: 5 sites tested.  5 sites sensed.      Left foot:      Protective Sensation: 5 sites tested.  5 sites sensed.   Lymphadenopathy:      Cervical: No cervical adenopathy.   Skin:     General: Skin is warm and dry.   Neurological:      General: No focal deficit present.      Mental Status: He is alert and oriented to person, place, and time. Mental status is at baseline.      Gait: Gait normal.      Deep Tendon Reflexes: Reflexes normal.   Psychiatric:         Mood and Affect: Mood normal.         Behavior: Behavior normal.         Thought Content: Thought content normal.         Judgment: Judgment normal.            Assessment:       Problem List Items Addressed This Visit          Cardiac/Vascular    Essential hypertension    Paroxysmal atrial fibrillation    Chronic combined systolic and diastolic heart failure       Renal/    Benign prostatic hyperplasia with nocturia       Endocrine    Type 2 diabetes mellitus without complication, without long-term current use of insulin    Relevant Orders    Comprehensive Metabolic Panel    Hemoglobin A1C    Ambulatory Referral/Consult to Primary Care Diabetic Management    Foot Exam Performed (Completed)       GI    GERD (gastroesophageal reflux disease)     Other Visit Diagnoses       Encounter to establish care with new doctor    -  Primary    Lacrimation, left        Need for vaccination                  Plan:       1. Encounter to establish care with new doctor  Reviewed chronic medical conditions, updated problem list and medication list    2. Type 2 diabetes mellitus without complication, without long-term current use of insulin  Repeat A1c to check control, last value was above goal  Foot exam accomplished today  Ref to  Diabetic clinic for additional follow-up  No change to medications today    -     Comprehensive Metabolic Panel; Future; Expected date: 12/13/2023  -     Hemoglobin A1C; Future; Expected date: 12/13/2023  -     Ambulatory Referral/Consult to Primary Care Diabetic Management; Future; Expected date: 12/20/2023  -     Foot Exam Performed    3. Essential hypertension  Stable, well controlled  Continue current meds    4. Paroxysmal atrial fibrillation  Stable, well controlled  Continue current regimen and specialist follow-up    5. Chronic combined systolic and diastolic heart failure  Stable, well controlled  No changes today    6. Lacrimation, left  Exam reassuring today, unlikely infectious cause  Potentially allergy related, pt may try OTC allergy eye drops  Further w/u if no improvement    7. Gastroesophageal reflux disease without esophagitis  Stable,  continue protonix    8. Benign prostatic hyperplasia with nocturia  Stable, continue flomax    9. Need for vaccination  Pt to set up flu and RSV through pharmacy         Follow up in about 6 months (around 6/13/2024).     Daniel Miranda MD, FAAFP  Family Medicine Physician  [facility]  12/13/2023

## 2023-12-13 NOTE — PATIENT INSTRUCTIONS
Referral to Primary Care Diabetes clinic to help get the A1c down    Lab results will show up on the portal, I'll send you a message with what I think about them.    RSV and COVID boosters through the pharmacy.

## 2023-12-18 ENCOUNTER — PATIENT MESSAGE (OUTPATIENT)
Dept: ADMINISTRATIVE | Facility: OTHER | Age: 63
End: 2023-12-18
Payer: COMMERCIAL

## 2023-12-18 ENCOUNTER — PATIENT MESSAGE (OUTPATIENT)
Dept: INTERNAL MEDICINE | Facility: CLINIC | Age: 63
End: 2023-12-18
Payer: COMMERCIAL

## 2023-12-18 DIAGNOSIS — N28.9 IMPAIRED RENAL FUNCTION: Primary | ICD-10-CM

## 2023-12-18 DIAGNOSIS — E11.9 TYPE 2 DIABETES MELLITUS WITHOUT COMPLICATION, WITHOUT LONG-TERM CURRENT USE OF INSULIN: ICD-10-CM

## 2023-12-20 ENCOUNTER — PATIENT MESSAGE (OUTPATIENT)
Dept: RESEARCH | Facility: HOSPITAL | Age: 63
End: 2023-12-20
Payer: COMMERCIAL

## 2023-12-26 RX ORDER — APIXABAN 5 MG/1
5 TABLET, FILM COATED ORAL 2 TIMES DAILY
Qty: 180 TABLET | Refills: 1 | Status: SHIPPED | OUTPATIENT
Start: 2023-12-26

## 2023-12-27 ENCOUNTER — PATIENT MESSAGE (OUTPATIENT)
Dept: INTERNAL MEDICINE | Facility: CLINIC | Age: 63
End: 2023-12-27
Payer: COMMERCIAL

## 2023-12-28 ENCOUNTER — PATIENT MESSAGE (OUTPATIENT)
Dept: RESEARCH | Facility: CLINIC | Age: 63
End: 2023-12-28
Payer: COMMERCIAL

## 2024-01-02 ENCOUNTER — TELEPHONE (OUTPATIENT)
Dept: INTERNAL MEDICINE | Facility: CLINIC | Age: 64
End: 2024-01-02
Payer: COMMERCIAL

## 2024-01-02 ENCOUNTER — PATIENT MESSAGE (OUTPATIENT)
Dept: INTERNAL MEDICINE | Facility: CLINIC | Age: 64
End: 2024-01-02
Payer: COMMERCIAL

## 2024-01-02 DIAGNOSIS — I10 ESSENTIAL HYPERTENSION: ICD-10-CM

## 2024-01-03 RX ORDER — SPIRONOLACTONE 25 MG/1
25 TABLET ORAL DAILY
Qty: 90 TABLET | Refills: 3 | Status: SHIPPED | OUTPATIENT
Start: 2024-01-03

## 2024-01-03 NOTE — TELEPHONE ENCOUNTER
Refill Routing Note   Medication(s) are not appropriate for processing by [facility] for the following reason(s):        Non-participating provider    [facility] action(s):  Route               Appointments  past 12m or future 3m with PCP    Date Provider   Last Visit   12/13/2023 Daniel Miranda MD   Next Visit   6/12/2024 Daniel Miranda MD   ED visits in past 90 days: 0        Note composed:4:44 PM 01/03/2024

## 2024-01-29 ENCOUNTER — TELEPHONE (OUTPATIENT)
Dept: INTERNAL MEDICINE | Facility: CLINIC | Age: 64
End: 2024-01-29
Payer: COMMERCIAL

## 2024-01-29 NOTE — TELEPHONE ENCOUNTER
----- Message from Sylvia Bradley sent at 1/29/2024 10:45 AM CST -----  Contact: Self/ [phone]  Reason for the appointment:    Patient preference of timeframe to be scheduled:  pt would like to know if he can be seen at an earlier appt time today   Would the patient like a call back, or a response through their [facility] portal?:   call back   Comments:

## 2024-01-30 ENCOUNTER — OFFICE VISIT (OUTPATIENT)
Dept: INTERNAL MEDICINE | Facility: CLINIC | Age: 64
End: 2024-01-30
Payer: COMMERCIAL

## 2024-01-30 VITALS
OXYGEN SATURATION: 95 % | HEIGHT: 71 IN | SYSTOLIC BLOOD PRESSURE: 112 MMHG | BODY MASS INDEX: 31.11 KG/M2 | HEART RATE: 78 BPM | DIASTOLIC BLOOD PRESSURE: 68 MMHG | WEIGHT: 222.25 LBS

## 2024-01-30 DIAGNOSIS — E11.9 TYPE 2 DIABETES MELLITUS WITHOUT COMPLICATION, WITHOUT LONG-TERM CURRENT USE OF INSULIN: Primary | ICD-10-CM

## 2024-01-30 DIAGNOSIS — I48.0 PAROXYSMAL ATRIAL FIBRILLATION: ICD-10-CM

## 2024-01-30 DIAGNOSIS — E66.09 CLASS 1 OBESITY DUE TO EXCESS CALORIES WITH SERIOUS COMORBIDITY AND BODY MASS INDEX (BMI) OF 30.0 TO 30.9 IN ADULT: ICD-10-CM

## 2024-01-30 DIAGNOSIS — I10 ESSENTIAL HYPERTENSION: ICD-10-CM

## 2024-01-30 DIAGNOSIS — I50.42 CHRONIC COMBINED SYSTOLIC AND DIASTOLIC HEART FAILURE: ICD-10-CM

## 2024-01-30 PROCEDURE — 99214 OFFICE O/P EST MOD 30 MIN: CPT | Mod: S$GLB,,, | Performed by: NURSE PRACTITIONER

## 2024-01-30 PROCEDURE — 1101F PT FALLS ASSESS-DOCD LE1/YR: CPT | Mod: CPTII,S$GLB,, | Performed by: NURSE PRACTITIONER

## 2024-01-30 PROCEDURE — 3008F BODY MASS INDEX DOCD: CPT | Mod: CPTII,S$GLB,, | Performed by: NURSE PRACTITIONER

## 2024-01-30 PROCEDURE — 1159F MED LIST DOCD IN RCRD: CPT | Mod: CPTII,S$GLB,, | Performed by: NURSE PRACTITIONER

## 2024-01-30 PROCEDURE — 1160F RVW MEDS BY RX/DR IN RCRD: CPT | Mod: CPTII,S$GLB,, | Performed by: NURSE PRACTITIONER

## 2024-01-30 PROCEDURE — 3074F SYST BP LT 130 MM HG: CPT | Mod: CPTII,S$GLB,, | Performed by: NURSE PRACTITIONER

## 2024-01-30 PROCEDURE — 99999 PR PBB SHADOW E&M-EST. PATIENT-LVL V: CPT | Mod: PBBFAC,,, | Performed by: NURSE PRACTITIONER

## 2024-01-30 PROCEDURE — 1126F AMNT PAIN NOTED NONE PRSNT: CPT | Mod: CPTII,S$GLB,, | Performed by: NURSE PRACTITIONER

## 2024-01-30 PROCEDURE — 3078F DIAST BP <80 MM HG: CPT | Mod: CPTII,S$GLB,, | Performed by: NURSE PRACTITIONER

## 2024-01-30 PROCEDURE — 3288F FALL RISK ASSESSMENT DOCD: CPT | Mod: CPTII,S$GLB,, | Performed by: NURSE PRACTITIONER

## 2024-01-30 RX ORDER — TIRZEPATIDE 2.5 MG/.5ML
2.5 INJECTION, SOLUTION SUBCUTANEOUS
Qty: 4 PEN | Refills: 4 | Status: SHIPPED | OUTPATIENT
Start: 2024-01-30 | End: 2024-06-18 | Stop reason: SDUPTHER

## 2024-01-30 RX ORDER — BLOOD-GLUCOSE SENSOR
EACH MISCELLANEOUS
Qty: 2 EACH | Refills: 11 | Status: SHIPPED | OUTPATIENT
Start: 2024-01-30

## 2024-01-30 NOTE — PROGRESS NOTES
CHIEF COMPLAINT: Type 2 Diabetes     HPI: Mr. Michael Slaughter Jr. is a 63 y.o. male who was diagnosed with Type 2 DM in 2009.    41 years. 3 children, 2 daughters- Rns, 1 son- .  Being seen by me for the first time.   Referred per pcp.  Works at [employer]-a lot of steps w/work.  Hobby: fishing    Egfr declined, CR 1.2 to 1.6 -metformin     1 cup --Coffee , abhijeet's creamer  Admits to eating sweets, higher carbs  Grilled meats, air frying  Chicken   Not healthiest options  Looking to retire in 1-2 years ago.  Water- drinks a lot throughout the day      Lab Results   Component Value Date    HGBA1C 8.8 (H) 12/13/2023       PREVIOUS DIABETES MEDICATIONS TRIED  Ozempic - nausea  Jardiance 25 mg - polyuria-discomfort  Metformin     CURRENT DIABETIC MEDS: metformin 1000 mg bid, jardiance 25 mg daily    in am    Diabetes Management Status    Statin: Taking  ACE/ARB: Not taking    Screening or Prevention Patient's value Goal Complete/Controlled?   HgA1C Testing and Control   Lab Results   Component Value Date    HGBA1C 8.8 (H) 12/13/2023      Annually/Less than 8% No   Lipid profile : 09/25/2023 Annually Yes   LDL control Lab Results   Component Value Date    LDLCALC 88.2 09/25/2023    Annually/Less than 100 mg/dl  Yes   Nephropathy screening Lab Results   Component Value Date    LABMICR 9.0 03/27/2023     Lab Results   Component Value Date    PROTEINUA Negative 09/25/2023    Annually Yes   Blood pressure BP Readings from Last 1 Encounters:   12/13/23 120/70    Less than 140/90 Yes   Dilated retinal exam : 03/27/2023 Annually Yes   Foot exam   : 12/13/2023 Annually Yes     REVIEW OF SYSTEMS  General: no weakness, fatigue, + weight changes.   Eyes: no double or blurred vision, eye pain, or redness  Cardiovascular: no chest pain, palpitations, edema, or murmurs.   Respiratory: no cough or dyspnea.   GI: no heartburn, nausea, or changes in bowel patterns; good appetite.   Skin: no rashes, dryness,  "itching, or reactions at insulin injection sites.  Neuro: no numbness, tingling, tremors, or vertigo.   Endocrine: no polyuria, polydipsia, polyphagia, heat or cold intolerance.     Vital Signs  /68 (BP Location: Left arm, Patient Position: Sitting, BP Method: Medium (Manual))   Pulse 78   Ht 5' 11" (1.803 m)   Wt 100.8 kg (222 lb 3.6 oz)   SpO2 95%   BMI 30.99 kg/m²     Hemoglobin A1C   Date Value Ref Range Status   12/13/2023 8.8 (H) 4.0 - 5.6 % Final     Comment:     ADA Screening Guidelines:  5.7-6.4%  Consistent with prediabetes  >or=6.5%  Consistent with diabetes    High levels of fetal hemoglobin interfere with the HbA1C  assay. Heterozygous hemoglobin variants (HbS, HgC, etc)do  not significantly interfere with this assay.   However, presence of multiple variants may affect accuracy.     09/25/2023 8.1 (H) 4.0 - 5.6 % Final     Comment:     ADA Screening Guidelines:  5.7-6.4%  Consistent with prediabetes  >or=6.5%  Consistent with diabetes    High levels of fetal hemoglobin interfere with the HbA1C  assay. Heterozygous hemoglobin variants (HbS, HgC, etc)do  not significantly interfere with this assay.   However, presence of multiple variants may affect accuracy.     06/26/2023 7.8 (H) 4.0 - 5.6 % Final     Comment:     ADA Screening Guidelines:  5.7-6.4%  Consistent with prediabetes  >or=6.5%  Consistent with diabetes    High levels of fetal hemoglobin interfere with the HbA1C  assay. Heterozygous hemoglobin variants (HbS, HgC, etc)do  not significantly interfere with this assay.   However, presence of multiple variants may affect accuracy.          Chemistry        Component Value Date/Time     12/13/2023 1501    K 4.4 12/13/2023 1501     12/13/2023 1501    CO2 25 12/13/2023 1501    BUN 15 12/13/2023 1501    CREATININE 1.6 (H) 12/13/2023 1501     (H) 12/13/2023 1501        Component Value Date/Time    CALCIUM 9.8 12/13/2023 1501    ALKPHOS 53 (L) 12/13/2023 1501    AST 13 " 12/13/2023 1501    ALT 19 12/13/2023 1501    BILITOT 0.5 12/13/2023 1501    ESTGFRAFRICA >60.0 03/23/2022 0940    EGFRNONAA >60.0 03/23/2022 0940           Lab Results   Component Value Date    TSH 1.480 10/03/2022      Lab Results   Component Value Date    CHOL 161 09/25/2023    CHOL 138 10/03/2022    CHOL 116 (L) 09/21/2021     Lab Results   Component Value Date    HDL 42 09/25/2023    HDL 41 10/03/2022    HDL 43 09/21/2021     Lab Results   Component Value Date    LDLCALC 88.2 09/25/2023    LDLCALC 73.8 10/03/2022    LDLCALC 48.8 (L) 09/21/2021     Lab Results   Component Value Date    TRIG 154 (H) 09/25/2023    TRIG 116 10/03/2022    TRIG 121 09/21/2021     Lab Results   Component Value Date    CHOLHDL 26.1 09/25/2023    CHOLHDL 29.7 10/03/2022    CHOLHDL 37.1 09/21/2021         PHYSICAL EXAMINATION  Constitutional: Appears well, no distress. Reviewed vitals above.  Eyes: conjunctivae & lids intact; PERRLA, EOMs intact; optic discs   Neck: Supple, trachea midline.   Respiratory: CTA without wheezes, even and unlabored  Cardiovascular: RRR; no edema or varicosities  Lymph: deferred   Skin: warm and dry; no injection site reactions, no acanthosis nigracans observed.  Neuro:patient alert and cooperative, normal affect; steady gait.  Psychiatric: judgement & insight intact, orientation of time, place & person intact, memory; mood & affect wnl     Diabetes Foot Exam:   Defer to next visit    Assessment/Plan  1. Type 2 diabetes mellitus without complication, without long-term current use of insulin  Hemoglobin A1C    Ambulatory Referral/Consult to Primary Care Diabetic Management    tirzepatide (MOUNJARO) 2.5 mg/0.5 mL PnIj    empagliflozin (JARDIANCE) 10 mg tablet    Hemoglobin A1C    blood-glucose sensor (FREESTYLE STEVAN 3 SENSOR) Tatiana    Basic Metabolic Panel    Ambulatory referral/consult to Diabetes Education      2. Paroxysmal atrial fibrillation        3. Essential hypertension        4. Class 1 obesity due to  excess calories with serious comorbidity and body mass index (BMI) of 30.0 to 30.9 in adult        5. Chronic combined systolic and diastolic heart failure          1-5. Follow up in 4 months w/ me   DE rigo, diet/nutrition   A1c in 4 months   A1ci bmp in 6 weeks  On eliquis  A1c goal less than 7%   Rigo 3   Cut back jardiance 10 mg daily   Add mounjaro 2.5 mg weekly   Discussed dietary habits, stressors   Continue metformin   Bp controlled  Body mass index is 30.99 kg/m².  May increase insulin resistance  F/u with cards  E nancy book -meal plan guide      FOLLOW UP  In 4 months

## 2024-01-30 NOTE — PATIENT INSTRUCTIONS
Follow up in 4 months w/Oliver   A1c bmp in 6 weeks   A1c in 4 months  DE education- rigo 3, nutrition     Jardiance 10 mg daily   Mounjaro 2.5 mg weekly  Rigo 3   Metfomrin 1000 mg twice a day     Lab Results   Component Value Date    HGBA1C 8.8 (H) 12/13/2023     Goal less than 7%    Goal  no higher than 180/200

## 2024-02-06 ENCOUNTER — CLINICAL SUPPORT (OUTPATIENT)
Dept: DIABETES | Facility: CLINIC | Age: 64
End: 2024-02-06
Payer: COMMERCIAL

## 2024-02-06 DIAGNOSIS — E11.9 TYPE 2 DIABETES MELLITUS WITHOUT COMPLICATION, WITHOUT LONG-TERM CURRENT USE OF INSULIN: ICD-10-CM

## 2024-02-06 PROCEDURE — 99999 PR PBB SHADOW E&M-EST. PATIENT-LVL I: CPT | Mod: PBBFAC,,,

## 2024-02-06 PROCEDURE — G0108 DIAB MANAGE TRN  PER INDIV: HCPCS | Mod: S$GLB,,,

## 2024-02-08 ENCOUNTER — PATIENT MESSAGE (OUTPATIENT)
Dept: CARDIOLOGY | Facility: CLINIC | Age: 64
End: 2024-02-08
Payer: COMMERCIAL

## 2024-02-08 ENCOUNTER — PATIENT MESSAGE (OUTPATIENT)
Dept: INTERNAL MEDICINE | Facility: CLINIC | Age: 64
End: 2024-02-08
Payer: COMMERCIAL

## 2024-02-08 DIAGNOSIS — I50.42 CHRONIC COMBINED SYSTOLIC AND DIASTOLIC HEART FAILURE: ICD-10-CM

## 2024-02-08 RX ORDER — SACUBITRIL AND VALSARTAN 97; 103 MG/1; MG/1
1 TABLET, FILM COATED ORAL 2 TIMES DAILY
Qty: 60 TABLET | Refills: 11 | Status: CANCELLED | OUTPATIENT
Start: 2024-02-08

## 2024-02-08 RX ORDER — SACUBITRIL AND VALSARTAN 97; 103 MG/1; MG/1
1 TABLET, FILM COATED ORAL 2 TIMES DAILY
Qty: 180 TABLET | Refills: 1 | Status: CANCELLED | OUTPATIENT
Start: 2024-02-08

## 2024-02-08 RX ORDER — SACUBITRIL AND VALSARTAN 97; 103 MG/1; MG/1
1 TABLET, FILM COATED ORAL 2 TIMES DAILY
Qty: 60 TABLET | Refills: 11 | Status: SHIPPED | OUTPATIENT
Start: 2024-02-08 | End: 2024-04-24 | Stop reason: SDUPTHER

## 2024-02-08 RX ORDER — SACUBITRIL AND VALSARTAN 97; 103 MG/1; MG/1
1 TABLET, FILM COATED ORAL 2 TIMES DAILY
Qty: 60 TABLET | Refills: 11 | Status: SHIPPED | OUTPATIENT
Start: 2024-02-08 | End: 2024-02-08 | Stop reason: SDUPTHER

## 2024-02-08 NOTE — TELEPHONE ENCOUNTER
Refill Decision Note   Michael Slaughter  is requesting a refill authorization.  Brief Assessment and Rationale for Refill:  Quick Discontinue     Medication Therapy Plan: Receipt confirmed by pharmacy (2/8/2024  1:04 PM CST)      Comments:     Note composed:2:53 PM 02/08/2024

## 2024-02-08 NOTE — TELEPHONE ENCOUNTER
Patient called about his Entresto.  Patient does not need approval of the medication.  The pharmacy is out and does not expect any to come in until tomorrow, if [facility] has it in stock.  Patient called.  No answer and unable to leave message.  Also notified via portal.

## 2024-02-08 NOTE — PROGRESS NOTES
Diabetes Care Specialist Progress Note  Author: Melva Acharya RD  Date: 2/8/2024    Program Intake  Reason for Diabetes Program Visit:: Intervention  Type of Intervention:: Individual  Individual: Device Training  Device Training: Personal CGM  Current diabetes risk level:: moderate  In the last 12 months, have you:: none  Continuous Glucose Monitoring  Patient has CGM: Yes  Personal CGM type:: Freestyle Rigo 3    Lab Results   Component Value Date    HGBA1C 8.8 (H) 12/13/2023     Clinical      There is no height or weight on file to calculate BMI.    Clinical Assessment  Current Diabetes Treatment: Oral Medication, Injectable    Labs  Do you have regular lab work to monitor your medications?: Yes  Type of Regular Lab Work: A1c, Cholesterol, Microalbumin, CBC, BMP  Lab Compliance Barriers: No    Additional Social History    Support  Does anyone support you with your diabetes care?: yes  Who supports you?: self, spouse, son/daughter  Who takes you to your medical appointments?: self  Does the current support meet the patient's needs?: Yes  Is Support an area impacting ability to self-manage diabetes?: No    Access to Mass Media & Technology  Does the patient have access to any of the following devices or technologies?: Internet Access, Smart phone  Media or technology needs impacting ability to self-manage diabetes?: No    Cognitive/Behavioral Health  Alert and Oriented: Yes  Difficulty Thinking: No  Requires Prompting: No  Requires assistance for routine expression?: No  Cognitive or behavioral barriers impacting ability to self-manage diabetes?: No    Culture/Mormonism  Culture or Baptist beliefs that may impact ability to access healthcare: No    Communication  Language preference: English  Hearing Problems: No  Vision Problems: No  Communication needs impacting ability to self-manage diabetes?: No    Health Literacy  Preferred Learning Method: Face to Face, Demonstration, Hands On  Health literacy needs  impacting ability to self-manage diabetes?: No    Diabetes Self-Management Skills Assessment    Diabetes Disease Process/Treatment Options  Diabetes Disease Process/Treatment Options: Skills Assessment Completed: No  Deferred due to:: Time  Area of need?: Deferred    Nutrition/Healthy Eating  Nutrition/Healthy Eating Skills Assessment Completed:: No  Deffered due to:: Time  Area of need?: Deferred    Physical Activity/Exercise  Physical Activity/Exercise Skills Assessment Completed: : No  Deffered due to:: Time  Area of need?: Deferred    Medications  Medication Skills Assessment Completed:: No  Deffered due to:: Time  Area of need?: Deferred    Home Blood Glucose Monitoring  Patient states that blood sugar is checked at home daily.: yes (Pt trained on Freestyle Rigo 3 during appt)  Monitoring Method:: personal continuous glucose monitor  Personal CGM type:: Freestyle Rigo 3  Home Blood Glucose Monitoring Skills Assessment Completed: : Yes  Assessment indicates:: Knowledge deficit, Instruction Needed  Area of need?: Yes    Acute Complications  Acute Complications Skills Assessment Completed: : No  Deffered due to:: Time  Area of need?: Deferred    Chronic Complications  Patient is taking statin?: Yes  Chronic Complications Skills Assessment Completed: : No  Deferred due to:: Time  Area of need?: Deferred    Psychosocial/Coping  Psychosocial/Coping Skills Assessment Completed: : No  Deffered due to:: Time  Area of need?: Deferred    Assessment Summary and Plan    Based on today's diabetes care assessment, the following areas of need were identified:          2/6/2024    12:01 AM   Social   Support No   Access to Mass Media/Tech No   Cognitive/Behavioral Health No   Culture/Sabianism No   Communication No   Health Literacy No            2/6/2024    12:01 AM   Clinical   Lab Compliance No            2/6/2024    12:01 AM   Diabetes Self-Management Skills   Diabetes Disease Process/Treatment Options Deferred    Nutrition/Healthy Eating Deferred   Physical Activity/Exercise Deferred   Medication Deferred   Home Blood Glucose Monitoring Yes, see care plan.   Acute Complications Deferred   Chronic Complications Deferred   Psychosocial/Coping Deferred      Today's interventions were provided through individual discussion, instruction, and written materials were provided.      Patient verbalized understanding of instruction and written materials.  Pt was able to return back demonstration of instructions today. Patient understood key points, needs reinforcement and further instruction.     Diabetes Self-Management Care Plan:    Today's Diabetes Self-Management Care Plan was developed with Michael's input. Michael has agreed to work toward the following goal(s) to improve his/her overall diabetes control.      Care Plan: Diabetes Management   Updates made since 1/9/2024 12:00 AM        Problem: Blood Glucose Self-Monitoring         Goal: Patient agrees to check and record blood sugars using Freestyle Rigo 3.    Start Date: 2/6/2024   Expected End Date: 5/8/2024   This Visit's Progress: Deferred   Priority: High   Barriers: Knowledge deficit   Note:    FREESTYLE RIGO 3 CGM TRAINING     Patient referred to Diabetes Management today for Freestyle Rigo 3 continuous glucose sensor system training.   Patient arrived with his reader charged and 1 sensor for application or will be using their compatible smart phone.      Provided personal FreeStyle Rigo 3 training - reviewed the following with patient:   No fingersticks required but if feeling s/s that do not match with SG reading or in event of hypoglycemia confirm with fingerstick  To receive alarms, reader must be within 20 feet  Low and High alarms discussed  Vitamin C (ascorbic acid) more than 500 mg can cause false readings  Can bath, shower or swim  Sensor is 14-day wear  FDA approved for back of the arm wear only  Sensor should be removed prior to exposing it to X-rays  Site  rotation  1 hour warm-up period  Scanning only required with new sensor start  Encouraged patient to scan more often - before each meal and before bed.      Explained in detail how the CGM works.  Explained graphs and arrow directions to determine the direction of blood sugar readings.     Caregiver/Patient successfully set up reader/phone and placed sensor on back of upper  arm.    If using smart device, instructed to go to [place] in phone and connect with [facility] #[phone] to enable sharing directly with clinic.  Educator provided pt with ways to contact educator if pt needs assistance. Pt denied scheduling F/U at this time.       Follow Up Plan     Follow up if symptoms worsen or fail to improve.    Today's care plan and follow up schedule was discussed with patient.  Michael verbalized understanding of the care plan, goals, and agrees to follow up plan.        The patient was encouraged to communicate with his/her health care provider/physician and care team regarding his/her condition(s) and treatment.  I provided the patient with my contact information today and encouraged to contact me via phone or [facility] Portal as needed.     Length of Visit   Total Time: 30 Minutes

## 2024-03-06 ENCOUNTER — PATIENT MESSAGE (OUTPATIENT)
Dept: INTERNAL MEDICINE | Facility: CLINIC | Age: 64
End: 2024-03-06
Payer: COMMERCIAL

## 2024-03-06 RX ORDER — DEXTROSE 4 G
TABLET,CHEWABLE ORAL
Qty: 1 EACH | Refills: 0 | Status: SHIPPED | OUTPATIENT
Start: 2024-03-06 | End: 2024-03-27 | Stop reason: SDUPTHER

## 2024-03-13 ENCOUNTER — PATIENT MESSAGE (OUTPATIENT)
Dept: INTERNAL MEDICINE | Facility: CLINIC | Age: 64
End: 2024-03-13
Payer: COMMERCIAL

## 2024-03-13 ENCOUNTER — LAB VISIT (OUTPATIENT)
Dept: LAB | Facility: HOSPITAL | Age: 64
End: 2024-03-13
Payer: COMMERCIAL

## 2024-03-13 DIAGNOSIS — E11.9 TYPE 2 DIABETES MELLITUS WITHOUT COMPLICATION, WITHOUT LONG-TERM CURRENT USE OF INSULIN: ICD-10-CM

## 2024-03-13 LAB
ANION GAP SERPL CALC-SCNC: 9 MMOL/L (ref 8–16)
BUN SERPL-MCNC: 16 MG/DL (ref 8–23)
CALCIUM SERPL-MCNC: 9.7 MG/DL (ref 8.7–10.5)
CHLORIDE SERPL-SCNC: 105 MMOL/L (ref 95–110)
CO2 SERPL-SCNC: 26 MMOL/L (ref 23–29)
CREAT SERPL-MCNC: 1.4 MG/DL (ref 0.5–1.4)
EST. GFR  (NO RACE VARIABLE): 55.8 ML/MIN/1.73 M^2
ESTIMATED AVG GLUCOSE: 186 MG/DL (ref 68–131)
GLUCOSE SERPL-MCNC: 131 MG/DL (ref 70–110)
HBA1C MFR BLD: 8.1 % (ref 4–5.6)
POTASSIUM SERPL-SCNC: 4.7 MMOL/L (ref 3.5–5.1)
SODIUM SERPL-SCNC: 140 MMOL/L (ref 136–145)

## 2024-03-13 PROCEDURE — 83036 HEMOGLOBIN GLYCOSYLATED A1C: CPT | Performed by: NURSE PRACTITIONER

## 2024-03-13 PROCEDURE — 80048 BASIC METABOLIC PNL TOTAL CA: CPT | Performed by: NURSE PRACTITIONER

## 2024-03-13 PROCEDURE — 36415 COLL VENOUS BLD VENIPUNCTURE: CPT | Performed by: NURSE PRACTITIONER

## 2024-03-14 ENCOUNTER — PATIENT MESSAGE (OUTPATIENT)
Dept: INTERNAL MEDICINE | Facility: CLINIC | Age: 64
End: 2024-03-14
Payer: COMMERCIAL

## 2024-03-15 ENCOUNTER — TELEPHONE (OUTPATIENT)
Dept: INTERNAL MEDICINE | Facility: CLINIC | Age: 64
End: 2024-03-15
Payer: COMMERCIAL

## 2024-03-15 ENCOUNTER — OFFICE VISIT (OUTPATIENT)
Dept: INTERNAL MEDICINE | Facility: CLINIC | Age: 64
End: 2024-03-15
Payer: COMMERCIAL

## 2024-03-15 VITALS
SYSTOLIC BLOOD PRESSURE: 94 MMHG | HEIGHT: 71 IN | DIASTOLIC BLOOD PRESSURE: 62 MMHG | OXYGEN SATURATION: 95 % | BODY MASS INDEX: 29.97 KG/M2 | WEIGHT: 214.06 LBS | HEART RATE: 82 BPM

## 2024-03-15 DIAGNOSIS — E66.3 OVERWEIGHT (BMI 25.0-29.9): ICD-10-CM

## 2024-03-15 DIAGNOSIS — I10 ESSENTIAL HYPERTENSION: ICD-10-CM

## 2024-03-15 DIAGNOSIS — E11.9 TYPE 2 DIABETES MELLITUS WITHOUT COMPLICATION, WITHOUT LONG-TERM CURRENT USE OF INSULIN: Primary | ICD-10-CM

## 2024-03-15 DIAGNOSIS — I50.42 CHRONIC COMBINED SYSTOLIC AND DIASTOLIC HEART FAILURE: ICD-10-CM

## 2024-03-15 DIAGNOSIS — I48.0 PAROXYSMAL ATRIAL FIBRILLATION: ICD-10-CM

## 2024-03-15 LAB — GLUCOSE SERPL-MCNC: 94 MG/DL (ref 70–110)

## 2024-03-15 PROCEDURE — 3074F SYST BP LT 130 MM HG: CPT | Mod: CPTII,S$GLB,, | Performed by: NURSE PRACTITIONER

## 2024-03-15 PROCEDURE — 3078F DIAST BP <80 MM HG: CPT | Mod: CPTII,S$GLB,, | Performed by: NURSE PRACTITIONER

## 2024-03-15 PROCEDURE — 1159F MED LIST DOCD IN RCRD: CPT | Mod: CPTII,S$GLB,, | Performed by: NURSE PRACTITIONER

## 2024-03-15 PROCEDURE — 3288F FALL RISK ASSESSMENT DOCD: CPT | Mod: CPTII,S$GLB,, | Performed by: NURSE PRACTITIONER

## 2024-03-15 PROCEDURE — 1126F AMNT PAIN NOTED NONE PRSNT: CPT | Mod: CPTII,S$GLB,, | Performed by: NURSE PRACTITIONER

## 2024-03-15 PROCEDURE — 1101F PT FALLS ASSESS-DOCD LE1/YR: CPT | Mod: CPTII,S$GLB,, | Performed by: NURSE PRACTITIONER

## 2024-03-15 PROCEDURE — 99214 OFFICE O/P EST MOD 30 MIN: CPT | Mod: S$GLB,,, | Performed by: NURSE PRACTITIONER

## 2024-03-15 PROCEDURE — 1160F RVW MEDS BY RX/DR IN RCRD: CPT | Mod: CPTII,S$GLB,, | Performed by: NURSE PRACTITIONER

## 2024-03-15 PROCEDURE — 3008F BODY MASS INDEX DOCD: CPT | Mod: CPTII,S$GLB,, | Performed by: NURSE PRACTITIONER

## 2024-03-15 PROCEDURE — 99999 PR PBB SHADOW E&M-EST. PATIENT-LVL IV: CPT | Mod: PBBFAC,,, | Performed by: NURSE PRACTITIONER

## 2024-03-15 PROCEDURE — 3052F HG A1C>EQUAL 8.0%<EQUAL 9.0%: CPT | Mod: CPTII,S$GLB,, | Performed by: NURSE PRACTITIONER

## 2024-03-15 PROCEDURE — 4010F ACE/ARB THERAPY RXD/TAKEN: CPT | Mod: CPTII,S$GLB,, | Performed by: NURSE PRACTITIONER

## 2024-03-15 PROCEDURE — 82962 GLUCOSE BLOOD TEST: CPT | Mod: S$GLB,,, | Performed by: NURSE PRACTITIONER

## 2024-03-15 NOTE — PROGRESS NOTES
CHIEF COMPLAINT: Type 2 Diabetes     HPI: Mr. Michael Slaughter Jr. is a 63 y.o. male who was diagnosed with Type 2 DM in 2009.    43 years. 3 children, 2 daughters- Rns, 1 son- .  Being seen by me for the second time.  Frustrated with mary ann 3 --- 112,114 mg/dl  Fingerstick during visit : 94 mg/dl   Has one touch verio   Avg 140 mg/dl   Gmi 6.7%   2 weeks  Glucose variability 22.3 %  Has appt in may 2024  See media    Referred per pcp.  Works at [employer]-a lot of steps w/work.  Hobby: fishing    Egfr declined, CR 1.2 to 1.6 -metformin     1 cup --Coffee , abhijeet's creamer  Admits to eating sweets, higher carbs  Grilled meats, air frying  Chicken   Not healthiest options  Looking to retire in 1-2 years ago.  Water- drinks a lot throughout the day      Avg 8.8% to 8.1%     Lab Results   Component Value Date    HGBA1C 8.1 (H) 03/13/2024       PREVIOUS DIABETES MEDICATIONS TRIED  Ozempic - nausea  Jardiance 25 mg - polyuria-discomfort  Metformin   Mounjaro     CURRENT DIABETIC MEDS: metformin 1000 mg bid, jardiance 25 mg daily , mounjaro 2.5 mg weekly - no nausea     Diabetes Management Status    Statin: Taking  ACE/ARB: Not taking    Screening or Prevention Patient's value Goal Complete/Controlled?   HgA1C Testing and Control   Lab Results   Component Value Date    HGBA1C 8.1 (H) 03/13/2024      Annually/Less than 8% No   Lipid profile : 09/25/2023 Annually Yes   LDL control Lab Results   Component Value Date    LDLCALC 88.2 09/25/2023    Annually/Less than 100 mg/dl  Yes   Nephropathy screening Lab Results   Component Value Date    LABMICR 9.0 03/27/2023     Lab Results   Component Value Date    PROTEINUA Negative 09/25/2023    Annually Yes   Blood pressure BP Readings from Last 1 Encounters:   01/30/24 112/68    Less than 140/90 Yes   Dilated retinal exam : 03/27/2023 Annually Yes   Foot exam   : 01/30/2024 Annually Yes     REVIEW OF SYSTEMS  General: no weakness, fatigue, + weight changes 8#  "loss   Eyes: no double or blurred vision, eye pain, or redness  Cardiovascular: no chest pain, palpitations, edema, or murmurs.   Respiratory: no cough or dyspnea.   GI: no heartburn, nausea, or changes in bowel patterns; good appetite.   Skin: no rashes, dryness, itching, or reactions at insulin injection sites.  Neuro: no numbness, tingling, tremors, or vertigo.   Endocrine: no polyuria, polydipsia, polyphagia, heat or cold intolerance.     Vital Signs  BP 94/62 (BP Location: Left arm, Patient Position: Sitting, BP Method: Medium (Manual))   Pulse 82   Ht 5' 11" (1.803 m)   Wt 97.1 kg (214 lb 1.1 oz)   SpO2 95%   BMI 29.86 kg/m²     Hemoglobin A1C   Date Value Ref Range Status   03/13/2024 8.1 (H) 4.0 - 5.6 % Final     Comment:     ADA Screening Guidelines:  5.7-6.4%  Consistent with prediabetes  >or=6.5%  Consistent with diabetes    High levels of fetal hemoglobin interfere with the HbA1C  assay. Heterozygous hemoglobin variants (HbS, HgC, etc)do  not significantly interfere with this assay.   However, presence of multiple variants may affect accuracy.     12/13/2023 8.8 (H) 4.0 - 5.6 % Final     Comment:     ADA Screening Guidelines:  5.7-6.4%  Consistent with prediabetes  >or=6.5%  Consistent with diabetes    High levels of fetal hemoglobin interfere with the HbA1C  assay. Heterozygous hemoglobin variants (HbS, HgC, etc)do  not significantly interfere with this assay.   However, presence of multiple variants may affect accuracy.     09/25/2023 8.1 (H) 4.0 - 5.6 % Final     Comment:     ADA Screening Guidelines:  5.7-6.4%  Consistent with prediabetes  >or=6.5%  Consistent with diabetes    High levels of fetal hemoglobin interfere with the HbA1C  assay. Heterozygous hemoglobin variants (HbS, HgC, etc)do  not significantly interfere with this assay.   However, presence of multiple variants may affect accuracy.          Chemistry        Component Value Date/Time     03/13/2024 0829    K 4.7 03/13/2024 " 0829     03/13/2024 0829    CO2 26 03/13/2024 0829    BUN 16 03/13/2024 0829    CREATININE 1.4 03/13/2024 0829     (H) 03/13/2024 0829        Component Value Date/Time    CALCIUM 9.7 03/13/2024 0829    ALKPHOS 53 (L) 12/13/2023 1501    AST 13 12/13/2023 1501    ALT 19 12/13/2023 1501    BILITOT 0.5 12/13/2023 1501    ESTGFRAFRICA >60.0 03/23/2022 0940    EGFRNONAA >60.0 03/23/2022 0940           Lab Results   Component Value Date    TSH 1.480 10/03/2022      Lab Results   Component Value Date    CHOL 161 09/25/2023    CHOL 138 10/03/2022    CHOL 116 (L) 09/21/2021     Lab Results   Component Value Date    HDL 42 09/25/2023    HDL 41 10/03/2022    HDL 43 09/21/2021     Lab Results   Component Value Date    LDLCALC 88.2 09/25/2023    LDLCALC 73.8 10/03/2022    LDLCALC 48.8 (L) 09/21/2021     Lab Results   Component Value Date    TRIG 154 (H) 09/25/2023    TRIG 116 10/03/2022    TRIG 121 09/21/2021     Lab Results   Component Value Date    CHOLHDL 26.1 09/25/2023    CHOLHDL 29.7 10/03/2022    CHOLHDL 37.1 09/21/2021         PHYSICAL EXAMINATION  Constitutional: Appears well, no distress. Reviewed vitals above.  Eyes: conjunctivae & lids intact; PERRLA, EOMs intact; optic discs   Neck: Supple, trachea midline.   Respiratory: CTA without wheezes, even and unlabored  Cardiovascular: RRR  Lymph: deferred   Skin: warm and dry; no injection site reactions, no acanthosis nigracans observed.  Neuro:patient alert and cooperative, normal affect; steady gait.  Psychiatric: judgement & insight intact, orientation of time, place & person intact, memory; mood & affect wnl     Diabetes Foot Exam:   Defer to next visit    Assessment/Plan  1. Type 2 diabetes mellitus without complication, without long-term current use of insulin  POCT Glucose, Hand-Held Device      2. Essential hypertension        3. Overweight (BMI 25.0-29.9)        4. Chronic combined systolic and diastolic heart failure        5. Paroxysmal atrial  fibrillation          1-5. Follow up in 4 months w/ me   F/u in May 2024   Labs in may 2024   Bp controlled   Body mass index is 29.86 kg/m².  Losing weight  F/u in cards     FOLLOW UP  In 4 months

## 2024-03-15 NOTE — Clinical Note
Reviewed bg in office today compared with mary ann  Doing much better in the past 30 days He will keep appt in may Concerns with accuracy with sensor Tolerating mounjaro

## 2024-03-15 NOTE — TELEPHONE ENCOUNTER
----- Message from Kayla Cooper sent at 3/15/2024  8:45 AM CDT -----  Contact: [phone]  Patient want to confirm appointment that was offered for today at 930 am, stated that is not showing in the system, and does not want to come just because, therefore he want to confirm. Please reach back to patient. Thank you.

## 2024-03-15 NOTE — PATIENT INSTRUCTIONS
Jardiance 10 mg daily  Mounjaro 2.5 mg weekly   Metformin 1000 mg twice a day     Lab Results   Component Value Date    HGBA1C 8.1 (H) 03/13/2024     Goal less than 7%  Avg 3 months     Rigo 3 vs dexcom g7   [email or web address]  Eat fit jovany  Myfitnesspal jovany  [email or web address]    [employer] jovany

## 2024-03-20 ENCOUNTER — CLINICAL SUPPORT (OUTPATIENT)
Dept: DIABETES | Facility: CLINIC | Age: 64
End: 2024-03-20
Payer: COMMERCIAL

## 2024-03-20 DIAGNOSIS — E11.9 TYPE 2 DIABETES MELLITUS WITHOUT COMPLICATION, WITHOUT LONG-TERM CURRENT USE OF INSULIN: Primary | ICD-10-CM

## 2024-03-20 PROCEDURE — G0108 DIAB MANAGE TRN  PER INDIV: HCPCS | Mod: S$GLB,,,

## 2024-03-21 NOTE — PROGRESS NOTES
Diabetes Care Specialist Progress Note  Author: Melva Acharya RD  Date: 3/21/2024    Program Intake  Reason for Diabetes Program Visit:: Intervention  Type of Intervention:: Individual  Individual: Device Training  Device Training: Personal CGM  Current diabetes risk level:: moderate  In the last 12 months, have you:: none  Continuous Glucose Monitoring  Patient has CGM: Yes  Personal CGM type:: Freestyle Rigo 3  GMI Date: 03/20/24  GMI Value: 6.5 %    Lab Results   Component Value Date    HGBA1C 8.1 (H) 03/13/2024     Clinical      There is no height or weight on file to calculate BMI.    Clinical Assessment  Current Diabetes Treatment: Oral Medication, Injectable    Medication Information  Medication adherence impacting ability to self-manage diabetes?: No    Labs  Do you have regular lab work to monitor your medications?: Yes  Type of Regular Lab Work: A1c, Cholesterol, Microalbumin, CBC, BMP  Lab Compliance Barriers: No    Additional Social History    Support  Does anyone support you with your diabetes care?: yes  Who supports you?: self, spouse, son/daughter  Who takes you to your medical appointments?: self  Does the current support meet the patient's needs?: Yes  Is Support an area impacting ability to self-manage diabetes?: No    Access to Mass Media & Technology  Does the patient have access to any of the following devices or technologies?: Internet Access, Smart phone  Media or technology needs impacting ability to self-manage diabetes?: No    Cognitive/Behavioral Health  Alert and Oriented: Yes  Difficulty Thinking: No  Requires Prompting: No  Requires assistance for routine expression?: No  Cognitive or behavioral barriers impacting ability to self-manage diabetes?: No    Culture/Confucianism  Culture or Oriental orthodox beliefs that may impact ability to access healthcare: No    Communication  Language preference: English  Hearing Problems: No  Vision Problems: No  Communication needs impacting ability to  self-manage diabetes?: No    Health Literacy  Preferred Learning Method: Face to Face, Demonstration, Hands On  Health literacy needs impacting ability to self-manage diabetes?: No    Diabetes Self-Management Skills Assessment    Diabetes Disease Process/Treatment Options  Diabetes Disease Process/Treatment Options: Skills Assessment Completed: No  Deferred due to:: Time  Area of need?: Deferred    Nutrition/Healthy Eating  Nutrition/Healthy Eating Skills Assessment Completed:: No  Deffered due to:: Time  Area of need?: Deferred    Physical Activity/Exercise  Physical Activity/Exercise Skills Assessment Completed: : No  Deffered due to:: Time  Area of need?: Deferred    Medications  Medication Skills Assessment Completed:: No  Deffered due to:: Time  Area of need?: Deferred    Home Blood Glucose Monitoring  Patient states that blood sugar is checked at home daily.: yes (Pt trained on sample of Dexcom G7 during appt)  Monitoring Method:: personal continuous glucose monitor  Personal CGM type:: Freestyle Rigo 3  CGM Report reviewed?: yes  Home Blood Glucose Monitoring Skills Assessment Completed: : Yes  Assessment indicates:: Instruction Needed  Area of need?: Yes    Acute Complications  Acute Complications Skills Assessment Completed: : No  Deffered due to:: Time  Area of need?: Deferred    Chronic Complications  Patient is taking statin?: Yes  Chronic Complications Skills Assessment Completed: : No  Deferred due to:: Time  Area of need?: Deferred    Psychosocial/Coping  Psychosocial/Coping Skills Assessment Completed: : No  Deffered due to:: Time  Area of need?: Deferred    Assessment Summary and Plan    Based on today's diabetes care assessment, the following areas of need were identified:          3/20/2024    12:01 AM   Social   Support No   Access to Mass Media/Tech No   Cognitive/Behavioral Health No   Culture/Yarsanism No   Communication No   Health Literacy No            3/20/2024    12:01 AM   Clinical    Medication Adherence No   Lab Compliance No            3/20/2024    12:01 AM   Diabetes Self-Management Skills   Diabetes Disease Process/Treatment Options Deferred   Nutrition/Healthy Eating Deferred   Physical Activity/Exercise Deferred   Medication Deferred   Home Blood Glucose Monitoring Yes, see care plan.   Acute Complications Deferred   Chronic Complications Deferred   Psychosocial/Coping Deferred      Today's interventions were provided through individual discussion, instruction, and written materials were provided.      Patient verbalized understanding of instruction and written materials.  Pt was able to return back demonstration of instructions today. Patient understood key points, needs reinforcement and further instruction.     Diabetes Self-Management Care Plan:    Today's Diabetes Self-Management Care Plan was developed with Michael's input. Michael has agreed to work toward the following goal(s) to improve his/her overall diabetes control.      Care Plan: Diabetes Management   Updates made since 2/20/2024 12:00 AM        Problem: Blood Glucose Self-Monitoring         Goal: Patient agrees to check and record blood sugars using Freestyle Rigo 3.    Start Date: 2/6/2024   Expected End Date: 5/8/2024   This Visit's Progress: On track   Recent Progress: Deferred   Priority: High   Barriers: Knowledge deficit   Note:    FREESTYLE RIGO 3 CGM TRAINING     Patient referred to Diabetes Management today for Freestyle Rigo 3 continuous glucose sensor system training.   Patient arrived with his reader charged and 1 sensor for application or will be using their compatible smart phone.      Provided personal FreeStyle Rigo 3 training - reviewed the following with patient:   No fingersticks required but if feeling s/s that do not match with SG reading or in event of hypoglycemia confirm with fingerstick  To receive alarms, reader must be within 20 feet  Low and High alarms discussed  Vitamin C (ascorbic acid)  "more than 500 mg can cause false readings  Can bath, shower or swim  Sensor is 14-day wear  FDA approved for back of the arm wear only  Sensor should be removed prior to exposing it to X-rays  Site rotation  1 hour warm-up period  Scanning only required with new sensor start  Encouraged patient to scan more often - before each meal and before bed.      Explained in detail how the CGM works.  Explained graphs and arrow directions to determine the direction of blood sugar readings.     Caregiver/Patient successfully set up reader/phone and placed sensor on back of upper  arm.    If using smart device, instructed to go to [employer] in phone and connect with [facility] #[phone] to enable sharing directly with clinic.  Educator provided pt with ways to contact educator if pt needs assistance. Pt denied scheduling F/U at this time.    3/20/24: Educator assisted pt with placing sample Dexcom G7 sensor.DEXCOM G6 CGM TRAINING     Patient referred to clinic today for Dexcom G6 continuous glucose sensor system training.  Dexcom G6 mobile jovany was downloaded to phone. Education was provided using "Quick Start Guide" per Dexcom protocol.     Pt will be using his phone as the primary .  Overview:  5min glucose reading updates, trending arrows, BG graph screens, battery life indicator, Blue Tooth Symbol.  Menus: trend Graph, start sensor, enter BG, events, Alerts, Settings, Shutdown, Stop Sensor   Settings:                          * Urgent Low: 55 mg/dL                          * Urgent Low Soon: on                          * Low alert: 80 mg/dl                           * High alert: 250 mg/dl                          * Rise rate: off                          * Fall Rate: off                          * Signal Loss: on repeat 20 min                          * No Reading: on repeat 20 min                          * Always sound: on                          * Alert Schedule:  (instructed on how to set up alert " schedule if desired)     Reviewed additional setting options with patient, including Graph Height and Transmitter ID. Transmitter was paired with .    Reviewed where to find sensor insertion time and sensor expiration date.   Discussed no need to calibrate sensor during the entirety of the 10 day wear. Discussed that pt can calibrate sensor if desired, but at that time he will need to continue calibrating every 12 hours for sensor to remain accurate. Reviewed appropriate calibration techniques.  Reviewed sensor site selection. Site selected and prepped using aseptic technique Inserted to right abdomin. Transmitter placed in pod and secured.  Practiced sensor pod/transmitter removal from site, and removal of transmitter from sensor pod.  Patient able to demonstrate without difficulty.  Encouraged to review manual prior to starting another sensor.   Reviewed problem solving aspects of sensor transmission/ variables that can disrupt RF transmission.  range 20 feet, but the first 3 hrs keep within 3 feet of transmitter.  Pt instructed on lag time of interstitial fluid from CBG and was advised to tx hypoglycemia and dose insulin based on SMBG values.  Dexcom technical support contact number given and examples of when to contact them discussed.    Advised to remove for CT, MRI, xrays  Advised sensor can get wet for bathing etc.  Should readings do not match symptoms to double check with glucometer.   DEXCOM G7 CGM TRAINING     Patient referred to clinic today for Dexcom G7 continuous glucose sensor system training.  Dexcom G7 mobile jovany was downloaded to phone.     Pt will be using his phone as the primary .  Overview:  5min glucose reading updates, trending arrows, BG graph screens, battery life indicator, Blue Tooth Symbol.  Menus: trend Graph, start sensor, enter BG, events, Alerts, Settings, Shutdown, Stop Sensor   Settings:                          * Urgent Low: 55 mg/dL                           * Urgent Low Soon: on                          * Rise rate: off                          * Fall Rate: off                          * Signal Loss: on repeat 20 min                          * No Reading: on repeat 20 min                          * Always sound: on                          * Alert Schedule:  (instructed on how to set up alert schedule if desired)     Reviewed additional setting options with patient, including Graph Height.  Discussed no need to calibrate sensor during the entirety of the 10 day wear. Discussed that pt can calibrate sensor if desired. Reviewed appropriate calibration techniques.  Reviewed sensor site selection. Site selected and prepped using aseptic technique Inserted to arm.  Advised to remove for CT, MRI, xrays  Advised sensor can get wet for bathing etc.  Should readings do not match symptoms to double check with glucometer.        Rigo report uploaded into media manager.    Follow Up Plan     Follow up in about 2 weeks (around 4/3/2024) for 2-week F/U, Rigo/Dexcom download.    Today's care plan and follow up schedule was discussed with patient.  Michael verbalized understanding of the care plan, goals, and agrees to follow up plan.        The patient was encouraged to communicate with his/her health care provider/physician and care team regarding his/her condition(s) and treatment.  I provided the patient with my contact information today and encouraged to contact me via phone or [facility] as needed.     Length of Visit   Total Time: 30 Minutes

## 2024-03-27 RX ORDER — DEXTROSE 4 G
TABLET,CHEWABLE ORAL
Qty: 1 EACH | Refills: 0 | Status: SHIPPED | OUTPATIENT
Start: 2024-03-27 | End: 2025-03-27

## 2024-03-27 NOTE — TELEPHONE ENCOUNTER
No care due was identified.  [facility] Embedded Care Due Messages. Reference number: [id number].   3/27/2024 5:08:40 AM CDT

## 2024-04-24 DIAGNOSIS — I50.42 CHRONIC COMBINED SYSTOLIC AND DIASTOLIC HEART FAILURE: ICD-10-CM

## 2024-04-24 RX ORDER — SACUBITRIL AND VALSARTAN 97; 103 MG/1; MG/1
1 TABLET, FILM COATED ORAL 2 TIMES DAILY
Qty: 60 TABLET | Refills: 11 | Status: SHIPPED | OUTPATIENT
Start: 2024-04-24

## 2024-05-08 ENCOUNTER — PATIENT MESSAGE (OUTPATIENT)
Dept: INTERNAL MEDICINE | Facility: CLINIC | Age: 64
End: 2024-05-08
Payer: COMMERCIAL

## 2024-05-10 ENCOUNTER — PATIENT MESSAGE (OUTPATIENT)
Dept: INTERNAL MEDICINE | Facility: CLINIC | Age: 64
End: 2024-05-10
Payer: COMMERCIAL

## 2024-05-14 ENCOUNTER — OFFICE VISIT (OUTPATIENT)
Dept: INTERNAL MEDICINE | Facility: CLINIC | Age: 64
End: 2024-05-14
Payer: COMMERCIAL

## 2024-05-14 VITALS
DIASTOLIC BLOOD PRESSURE: 68 MMHG | SYSTOLIC BLOOD PRESSURE: 110 MMHG | HEART RATE: 78 BPM | WEIGHT: 214.94 LBS | HEIGHT: 71 IN | BODY MASS INDEX: 30.09 KG/M2 | OXYGEN SATURATION: 98 %

## 2024-05-14 DIAGNOSIS — R21 RASH: Primary | ICD-10-CM

## 2024-05-14 DIAGNOSIS — Z01.89 ROUTINE LAB DRAW: ICD-10-CM

## 2024-05-14 DIAGNOSIS — D22.9 NEVUS: ICD-10-CM

## 2024-05-14 DIAGNOSIS — K13.0 ANGULAR CHEILITIS: ICD-10-CM

## 2024-05-14 DIAGNOSIS — H61.23 BILATERAL IMPACTED CERUMEN: ICD-10-CM

## 2024-05-14 PROCEDURE — 3008F BODY MASS INDEX DOCD: CPT | Mod: CPTII,S$GLB,, | Performed by: FAMILY MEDICINE

## 2024-05-14 PROCEDURE — 3052F HG A1C>EQUAL 8.0%<EQUAL 9.0%: CPT | Mod: CPTII,S$GLB,, | Performed by: FAMILY MEDICINE

## 2024-05-14 PROCEDURE — 4010F ACE/ARB THERAPY RXD/TAKEN: CPT | Mod: CPTII,S$GLB,, | Performed by: FAMILY MEDICINE

## 2024-05-14 PROCEDURE — 99999 PR PBB SHADOW E&M-EST. PATIENT-LVL III: CPT | Mod: PBBFAC,,, | Performed by: FAMILY MEDICINE

## 2024-05-14 PROCEDURE — 1101F PT FALLS ASSESS-DOCD LE1/YR: CPT | Mod: CPTII,S$GLB,, | Performed by: FAMILY MEDICINE

## 2024-05-14 PROCEDURE — 3288F FALL RISK ASSESSMENT DOCD: CPT | Mod: CPTII,S$GLB,, | Performed by: FAMILY MEDICINE

## 2024-05-14 PROCEDURE — 99214 OFFICE O/P EST MOD 30 MIN: CPT | Mod: 25,S$GLB,, | Performed by: FAMILY MEDICINE

## 2024-05-14 PROCEDURE — 3074F SYST BP LT 130 MM HG: CPT | Mod: CPTII,S$GLB,, | Performed by: FAMILY MEDICINE

## 2024-05-14 PROCEDURE — 1126F AMNT PAIN NOTED NONE PRSNT: CPT | Mod: CPTII,S$GLB,, | Performed by: FAMILY MEDICINE

## 2024-05-14 PROCEDURE — 3078F DIAST BP <80 MM HG: CPT | Mod: CPTII,S$GLB,, | Performed by: FAMILY MEDICINE

## 2024-05-14 PROCEDURE — 69209 REMOVE IMPACTED EAR WAX UNI: CPT | Mod: 50,S$GLB,, | Performed by: FAMILY MEDICINE

## 2024-05-14 RX ORDER — MUPIROCIN 20 MG/G
OINTMENT TOPICAL 2 TIMES DAILY
Qty: 22 G | Refills: 1 | Status: SHIPPED | OUTPATIENT
Start: 2024-05-14 | End: 2024-05-28

## 2024-05-14 RX ORDER — DESONIDE 0.5 MG/G
CREAM TOPICAL 2 TIMES DAILY
Qty: 15 G | Refills: 0 | Status: SHIPPED | OUTPATIENT
Start: 2024-05-14 | End: 2024-05-24 | Stop reason: SDUPTHER

## 2024-05-14 NOTE — PROGRESS NOTES
"Subjective:     Patient ID: Michael Slaughter Jr. is a 63 y.o. male.   Chief Complaint: Follow-up    HPI:  Presenting today acutely for rash in and around his ears. Also c/o continued lips cracking that aren't healing.    Lips are cracking on the corners of the mouth and having some scabbing on his lips. Said he looked on the internet and was told it could be oral cancer. Says he has teeth w/ 3 cavities. He says the cracked skin is painful and never seems to fully heal.    Also c/o itching in the ears. Says that there is a rash/scab that appears and heals but returns on the R ear. He has been using a Qtip and putting neosporin in and around the pinna and aural meatus.    Also c/o mole on his buttock that itches. Would like this checked for skin cancer.    Review of Systems   Constitutional:  Negative for chills and fever.   HENT:  Positive for ear pain, mouth dryness and mouth sores. Negative for ear discharge.    Respiratory:  Negative for chest tightness and shortness of breath.    Cardiovascular:  Negative for chest pain.   Integumentary:  Positive for rash and mole/lesion.          Objective:      Vitals:    05/14/24 0826   BP: 110/68   BP Location: Right arm   Patient Position: Sitting   BP Method: Medium (Manual)   Pulse: 78   SpO2: 98%   Weight: 97.5 kg (214 lb 15.2 oz)   Height: 5' 11" (1.803 m)      Physical Exam  Vitals reviewed.   Constitutional:       General: He is not in acute distress.     Appearance: Normal appearance. He is not ill-appearing.   HENT:      Head: Normocephalic and atraumatic.      Right Ear: Hearing normal. Swelling present. No drainage. There is impacted cerumen. No foreign body. No mastoid tenderness. Tympanic membrane is not erythematous or bulging.      Left Ear: Hearing, ear canal and external ear normal. No drainage or swelling. There is impacted cerumen. No foreign body. No mastoid tenderness. Tympanic membrane is not erythematous or bulging.      Ears:        Comments: Copious " cerumen bilaterally, removed (see procedure documentation); R pinna with notable swelling and some evidence of hyperpigmentation in the area circled, auditory meatus is slightly obscured from the swelling     Mouth/Throat:      Lips: Pink. Lesions (cracking/fissuring noted at angles of the lips, evidence of past scabs on the top lip at the vermilion border) present.      Mouth: Mucous membranes are moist.      Dentition: No gum lesions.      Tongue: No lesions.     Skin:            Comments: Dark nevus with regular borders, questionable scaling, evidence of excoriation; other than excoriation and scaling it appears similar to surrounding nevi   Neurological:      General: No focal deficit present.      Mental Status: He is alert and oriented to person, place, and time. Mental status is at baseline.   Psychiatric:         Mood and Affect: Mood normal.         Behavior: Behavior normal.         Thought Content: Thought content normal.         Judgment: Judgment normal.     Ear Cerumen Removal    Date/Time: 5/14/2024 8:20 AM    Performed by: Daniel Miranda MD  Authorized by: Daniel Miranda MD    Consent Done?:  Yes (Verbal)    Local anesthetic:  None  Ceruminolytics applied: Ceruminolytics applied prior to the procedure    Medication Used:  Debrox  Location details:  Both ears  Procedure type: curette and irrigation    Cerumen  Removal Results:  Cerumen completely removed  Patient tolerance:  Patient tolerated the procedure well with no immediate complications          Assessment:       Problem List Items Addressed This Visit    None  Visit Diagnoses       Rash    -  Primary    Relevant Medications    desonide (DESOWEN) 0.05 % cream    Other Relevant Orders    Ambulatory referral/consult to Dermatology    Nevus        Relevant Orders    Ambulatory referral/consult to Dermatology    Bilateral impacted cerumen        Relevant Orders    Ear wax removal (Completed)    Angular cheilitis        Relevant Medications     mupirocin (BACTROBAN) 2 % ointment    Other Relevant Orders    Ambulatory referral/consult to Dermatology    FOLATE    NIACIN (VITAMIN B3)    VITAMIN B2    VITAMIN C    Routine lab draw        Relevant Orders    LIPID PANEL    Comprehensive Metabolic Panel    CBC Auto Differential    TSH    T4, Free    PSA, Screening              Plan:       1. Rash  Possibly eczematous  Will treat with low potency steroid x14 days  Ref to derm for further eval and long-term mgmt if this does not respond to acute steroid tx  - Ambulatory referral/consult to Dermatology; Future  - desonide (DESOWEN) 0.05 % cream; Apply topically 2 (two) times daily. for 14 days  Dispense: 15 g; Refill: 0    2. Nevus  Appears benign, though change in character of lesion merits further eval  - Ambulatory referral/consult to Dermatology; Future    3. Bilateral impacted cerumen  See procedure documentation  - Ear wax removal    4. Angular cheilitis  Ordering labs to r/o deficiency as cause  T2DM does increase risk of this condition  Tx w/ mupirocin BID x14 days, also use petrolatum to maintain moisture barrier at night  - Ambulatory referral/consult to Dermatology; Future  - mupirocin (BACTROBAN) 2 % ointment; Apply topically 2 (two) times daily. for 14 days  Dispense: 22 g; Refill: 1  - FOLATE; Future  - NIACIN (VITAMIN B3); Future  - VITAMIN B2; Future  - VITAMIN C; Future    5. Routine lab draw  Ordering in advance of upcoming physical  - LIPID PANEL; Future  - Comprehensive Metabolic Panel; Future  - CBC Auto Differential; Future  - TSH; Future  - T4, Free; Future  - PSA, Screening; Future          Follow up in about 2 weeks (around 5/28/2024) for Annual Visit, Fasting labs.     Daniel Miranda MD, FAAFP  Family Medicine Physician  [facility]  05/14/2024

## 2024-05-16 ENCOUNTER — PATIENT MESSAGE (OUTPATIENT)
Dept: INTERNAL MEDICINE | Facility: CLINIC | Age: 64
End: 2024-05-16
Payer: COMMERCIAL

## 2024-05-16 ENCOUNTER — LAB VISIT (OUTPATIENT)
Dept: LAB | Facility: HOSPITAL | Age: 64
End: 2024-05-16
Payer: COMMERCIAL

## 2024-05-16 DIAGNOSIS — Z01.89 ROUTINE LAB DRAW: ICD-10-CM

## 2024-05-16 DIAGNOSIS — E11.9 TYPE 2 DIABETES MELLITUS WITHOUT COMPLICATION, WITHOUT LONG-TERM CURRENT USE OF INSULIN: ICD-10-CM

## 2024-05-16 DIAGNOSIS — K13.0 ANGULAR CHEILITIS: ICD-10-CM

## 2024-05-16 LAB
ALBUMIN SERPL BCP-MCNC: 4.2 G/DL (ref 3.5–5.2)
ALP SERPL-CCNC: 47 U/L (ref 55–135)
ALT SERPL W/O P-5'-P-CCNC: 17 U/L (ref 10–44)
ANION GAP SERPL CALC-SCNC: 10 MMOL/L (ref 8–16)
AST SERPL-CCNC: 12 U/L (ref 10–40)
BASOPHILS # BLD AUTO: 0.05 K/UL (ref 0–0.2)
BASOPHILS NFR BLD: 0.7 % (ref 0–1.9)
BILIRUB SERPL-MCNC: 0.6 MG/DL (ref 0.1–1)
BUN SERPL-MCNC: 19 MG/DL (ref 8–23)
CALCIUM SERPL-MCNC: 10.7 MG/DL (ref 8.7–10.5)
CHLORIDE SERPL-SCNC: 105 MMOL/L (ref 95–110)
CHOLEST SERPL-MCNC: 139 MG/DL (ref 120–199)
CHOLEST/HDLC SERPL: 4 {RATIO} (ref 2–5)
CO2 SERPL-SCNC: 24 MMOL/L (ref 23–29)
COMPLEXED PSA SERPL-MCNC: 0.87 NG/ML (ref 0–4)
CREAT SERPL-MCNC: 1.3 MG/DL (ref 0.5–1.4)
DIFFERENTIAL METHOD BLD: NORMAL
EOSINOPHIL # BLD AUTO: 0.1 K/UL (ref 0–0.5)
EOSINOPHIL NFR BLD: 1.7 % (ref 0–8)
ERYTHROCYTE [DISTWIDTH] IN BLOOD BY AUTOMATED COUNT: 14.4 % (ref 11.5–14.5)
EST. GFR  (NO RACE VARIABLE): >60 ML/MIN/1.73 M^2
ESTIMATED AVG GLUCOSE: 151 MG/DL (ref 68–131)
FOLATE SERPL-MCNC: 11.1 NG/ML (ref 4–24)
GLUCOSE SERPL-MCNC: 124 MG/DL (ref 70–110)
HBA1C MFR BLD: 6.9 % (ref 4–5.6)
HCT VFR BLD AUTO: 50.3 % (ref 40–54)
HDLC SERPL-MCNC: 35 MG/DL (ref 40–75)
HDLC SERPL: 25.2 % (ref 20–50)
HGB BLD-MCNC: 16.4 G/DL (ref 14–18)
IMM GRANULOCYTES # BLD AUTO: 0.02 K/UL (ref 0–0.04)
IMM GRANULOCYTES NFR BLD AUTO: 0.3 % (ref 0–0.5)
LDLC SERPL CALC-MCNC: 71 MG/DL (ref 63–159)
LYMPHOCYTES # BLD AUTO: 1.6 K/UL (ref 1–4.8)
LYMPHOCYTES NFR BLD: 22.1 % (ref 18–48)
MCH RBC QN AUTO: 27 PG (ref 27–31)
MCHC RBC AUTO-ENTMCNC: 32.6 G/DL (ref 32–36)
MCV RBC AUTO: 83 FL (ref 82–98)
MONOCYTES # BLD AUTO: 0.7 K/UL (ref 0.3–1)
MONOCYTES NFR BLD: 9.1 % (ref 4–15)
NEUTROPHILS # BLD AUTO: 4.7 K/UL (ref 1.8–7.7)
NEUTROPHILS NFR BLD: 66.1 % (ref 38–73)
NONHDLC SERPL-MCNC: 104 MG/DL
NRBC BLD-RTO: 0 /100 WBC
PLATELET # BLD AUTO: 309 K/UL (ref 150–450)
PMV BLD AUTO: 11.8 FL (ref 9.2–12.9)
POTASSIUM SERPL-SCNC: 4.8 MMOL/L (ref 3.5–5.1)
PROT SERPL-MCNC: 7.7 G/DL (ref 6–8.4)
RBC # BLD AUTO: 6.07 M/UL (ref 4.6–6.2)
SODIUM SERPL-SCNC: 139 MMOL/L (ref 136–145)
T4 FREE SERPL-MCNC: 0.9 NG/DL (ref 0.71–1.51)
TRIGL SERPL-MCNC: 165 MG/DL (ref 30–150)
TSH SERPL DL<=0.005 MIU/L-ACNC: 1.32 UIU/ML (ref 0.4–4)
WBC # BLD AUTO: 7.15 K/UL (ref 3.9–12.7)

## 2024-05-16 PROCEDURE — 84252 ASSAY OF VITAMIN B-2: CPT | Performed by: FAMILY MEDICINE

## 2024-05-16 PROCEDURE — 84153 ASSAY OF PSA TOTAL: CPT | Performed by: FAMILY MEDICINE

## 2024-05-16 PROCEDURE — 80061 LIPID PANEL: CPT | Performed by: FAMILY MEDICINE

## 2024-05-16 PROCEDURE — 82180 ASSAY OF ASCORBIC ACID: CPT | Performed by: FAMILY MEDICINE

## 2024-05-16 PROCEDURE — 82746 ASSAY OF FOLIC ACID SERUM: CPT | Performed by: FAMILY MEDICINE

## 2024-05-16 PROCEDURE — 83036 HEMOGLOBIN GLYCOSYLATED A1C: CPT | Performed by: NURSE PRACTITIONER

## 2024-05-16 PROCEDURE — 84443 ASSAY THYROID STIM HORMONE: CPT | Performed by: FAMILY MEDICINE

## 2024-05-16 PROCEDURE — 84439 ASSAY OF FREE THYROXINE: CPT | Performed by: FAMILY MEDICINE

## 2024-05-16 PROCEDURE — 36415 COLL VENOUS BLD VENIPUNCTURE: CPT | Performed by: FAMILY MEDICINE

## 2024-05-16 PROCEDURE — 84591 ASSAY OF NOS VITAMIN: CPT | Performed by: FAMILY MEDICINE

## 2024-05-16 PROCEDURE — 80053 COMPREHEN METABOLIC PANEL: CPT | Performed by: FAMILY MEDICINE

## 2024-05-16 PROCEDURE — 85025 COMPLETE CBC W/AUTO DIFF WBC: CPT | Performed by: FAMILY MEDICINE

## 2024-05-20 LAB
NIACIN SERPL-MCNC: <5 NG/ML
NICOTINAMIDE SERPL-MCNC: 21.1 NG/ML (ref 5–48)
NICOTINURATE SERPL-MCNC: <5 NG/ML
VIT B2 SERPL-MCNC: 3 MCG/L (ref 1–19)
VIT C SERPL-MCNC: 5 MG/L (ref 2–19)

## 2024-05-24 DIAGNOSIS — R21 RASH: ICD-10-CM

## 2024-05-24 RX ORDER — DESONIDE 0.5 MG/G
CREAM TOPICAL 2 TIMES DAILY
Qty: 45 G | Refills: 3 | Status: SHIPPED | OUTPATIENT
Start: 2024-05-24 | End: 2024-06-07

## 2024-05-24 NOTE — TELEPHONE ENCOUNTER
Refill Routing Note   Medication(s) are not appropriate for processing by [facility] for the following reason(s):        New or recently adjusted medication    [facility] action(s):  Defer             Appointments  past 12m or future 3m with PCP    Date Provider   Last Visit   5/14/2024 Daniel Miranda MD   Next Visit   6/12/2024 Daniel Miranda MD   ED visits in past 90 days: 0        Note composed:9:04 AM 05/24/2024

## 2024-05-24 NOTE — TELEPHONE ENCOUNTER
No care due was identified.  [facility] Embedded Care Due Messages. Reference number: [id number].   5/24/2024 5:08:44 AM CDT

## 2024-05-30 ENCOUNTER — OFFICE VISIT (OUTPATIENT)
Dept: INTERNAL MEDICINE | Facility: CLINIC | Age: 64
End: 2024-05-30
Payer: COMMERCIAL

## 2024-05-30 ENCOUNTER — PATIENT MESSAGE (OUTPATIENT)
Dept: INTERNAL MEDICINE | Facility: CLINIC | Age: 64
End: 2024-05-30

## 2024-05-30 ENCOUNTER — E-CONSULT (OUTPATIENT)
Dept: CARDIOLOGY | Facility: HOSPITAL | Age: 64
End: 2024-05-30
Payer: COMMERCIAL

## 2024-05-30 VITALS
BODY MASS INDEX: 30.71 KG/M2 | WEIGHT: 219.38 LBS | SYSTOLIC BLOOD PRESSURE: 100 MMHG | DIASTOLIC BLOOD PRESSURE: 62 MMHG | OXYGEN SATURATION: 97 % | HEIGHT: 71 IN | HEART RATE: 82 BPM

## 2024-05-30 DIAGNOSIS — I50.42 CHRONIC COMBINED SYSTOLIC AND DIASTOLIC HEART FAILURE: Primary | ICD-10-CM

## 2024-05-30 DIAGNOSIS — Z12.11 COLON CANCER SCREENING: ICD-10-CM

## 2024-05-30 DIAGNOSIS — I48.0 PAROXYSMAL ATRIAL FIBRILLATION: ICD-10-CM

## 2024-05-30 DIAGNOSIS — E66.3 OVERWEIGHT (BMI 25.0-29.9): ICD-10-CM

## 2024-05-30 DIAGNOSIS — Z79.01 ANTICOAGULATED: ICD-10-CM

## 2024-05-30 DIAGNOSIS — I10 ESSENTIAL HYPERTENSION: ICD-10-CM

## 2024-05-30 DIAGNOSIS — I50.42 CHRONIC COMBINED SYSTOLIC AND DIASTOLIC HEART FAILURE: ICD-10-CM

## 2024-05-30 DIAGNOSIS — Z79.01 ANTICOAGULATED: Primary | ICD-10-CM

## 2024-05-30 DIAGNOSIS — E11.9 TYPE 2 DIABETES MELLITUS WITHOUT COMPLICATION, WITHOUT LONG-TERM CURRENT USE OF INSULIN: Primary | ICD-10-CM

## 2024-05-30 PROCEDURE — 99451 NTRPROF PH1/NTRNET/EHR 5/>: CPT | Mod: ,,, | Performed by: INTERNAL MEDICINE

## 2024-05-30 PROCEDURE — 1160F RVW MEDS BY RX/DR IN RCRD: CPT | Mod: CPTII,S$GLB,, | Performed by: NURSE PRACTITIONER

## 2024-05-30 PROCEDURE — 1159F MED LIST DOCD IN RCRD: CPT | Mod: CPTII,S$GLB,, | Performed by: NURSE PRACTITIONER

## 2024-05-30 PROCEDURE — 3008F BODY MASS INDEX DOCD: CPT | Mod: CPTII,S$GLB,, | Performed by: NURSE PRACTITIONER

## 2024-05-30 PROCEDURE — 1126F AMNT PAIN NOTED NONE PRSNT: CPT | Mod: CPTII,S$GLB,, | Performed by: NURSE PRACTITIONER

## 2024-05-30 PROCEDURE — 4010F ACE/ARB THERAPY RXD/TAKEN: CPT | Mod: CPTII,S$GLB,, | Performed by: NURSE PRACTITIONER

## 2024-05-30 PROCEDURE — 1101F PT FALLS ASSESS-DOCD LE1/YR: CPT | Mod: CPTII,S$GLB,, | Performed by: NURSE PRACTITIONER

## 2024-05-30 PROCEDURE — 3078F DIAST BP <80 MM HG: CPT | Mod: CPTII,S$GLB,, | Performed by: NURSE PRACTITIONER

## 2024-05-30 PROCEDURE — 3044F HG A1C LEVEL LT 7.0%: CPT | Mod: CPTII,S$GLB,, | Performed by: NURSE PRACTITIONER

## 2024-05-30 PROCEDURE — 3288F FALL RISK ASSESSMENT DOCD: CPT | Mod: CPTII,S$GLB,, | Performed by: NURSE PRACTITIONER

## 2024-05-30 PROCEDURE — 3074F SYST BP LT 130 MM HG: CPT | Mod: CPTII,S$GLB,, | Performed by: NURSE PRACTITIONER

## 2024-05-30 PROCEDURE — 99214 OFFICE O/P EST MOD 30 MIN: CPT | Mod: S$GLB,,, | Performed by: NURSE PRACTITIONER

## 2024-05-30 PROCEDURE — 99999 PR PBB SHADOW E&M-EST. PATIENT-LVL IV: CPT | Mod: PBBFAC,,, | Performed by: NURSE PRACTITIONER

## 2024-05-30 RX ORDER — METFORMIN HYDROCHLORIDE 500 MG/1
500 TABLET ORAL 2 TIMES DAILY WITH MEALS
Qty: 180 TABLET | Refills: 3 | Status: SHIPPED | OUTPATIENT
Start: 2024-05-30 | End: 2025-05-30

## 2024-05-30 NOTE — PROGRESS NOTES
CHIEF COMPLAINT: Type 2 Diabetes     HPI: Mr. Michael Slaughter Jr. is a 63 y.o. male who was diagnosed with Type 2 DM in 2009.    41 years. 3 children, 2 daughters- Rns, 1 son- .    Being seen by me for the third time.  Cutting grass-- 1x a week.  [place]- walking 5 x a week.   Using mary ann 3   Avg 132 mg/dl  Gmi 6.5%  Glucose variability 19.9%  Will like to get off metformin.     Lab Results   Component Value Date    HGBA1C 6.9 (H) 05/16/2024     See media    Hobby: fishing    Egfr declined, CR 1.2 to 1.6 -metformin     1 cup --Coffee , abhijeet's creamer  Admits to eating sweets, higher carbs  Grilled meats, air frying  Chicken   Not healthiest options  Looking to retire in 1-2 years ago.  Water- drinks a lot throughout the day      Avg 8.8% to 8.1%     Lab Results   Component Value Date    HGBA1C 6.9 (H) 05/16/2024       PREVIOUS DIABETES MEDICATIONS TRIED  Ozempic - nausea  Jardiance 25 mg - polyuria-discomfort  Metformin   Mounjaro     CURRENT DIABETIC MEDS: metformin 1000 mg bid, jardiance 25 mg daily , mounjaro 2.5 mg weekly - no nausea     Diabetes Management Status    Statin: Taking  ACE/ARB: Not taking    Screening or Prevention Patient's value Goal Complete/Controlled?   HgA1C Testing and Control   Lab Results   Component Value Date    HGBA1C 6.9 (H) 05/16/2024      Annually/Less than 8% No   Lipid profile : 05/16/2024 Annually Yes   LDL control Lab Results   Component Value Date    LDLCALC 71.0 05/16/2024    Annually/Less than 100 mg/dl  Yes   Nephropathy screening Lab Results   Component Value Date    LABMICR 9.0 03/27/2023     Lab Results   Component Value Date    PROTEINUA Negative 09/25/2023    Annually Yes   Blood pressure BP Readings from Last 1 Encounters:   05/14/24 110/68    Less than 140/90 Yes   Dilated retinal exam : 03/27/2023 Annually Yes   Foot exam   : 01/30/2024 Annually Yes     REVIEW OF SYSTEMS  General: no weakness, fatigue, + weight changes 1-2#   Eyes:  "no double or blurred vision, eye pain, or redness  Cardiovascular: no chest pain, palpitations, edema, or murmurs.   Respiratory: no cough or dyspnea.   GI: no heartburn, nausea, or changes in bowel patterns; good appetite.   Skin: no rashes, dryness, itching, or reactions at insulin injection sites.  Neuro: no numbness, tingling, tremors, or vertigo.   Endocrine: no polyuria, polydipsia, polyphagia, heat or cold intolerance.     Vital Signs  /62 (BP Location: Left arm, Patient Position: Sitting, BP Method: Medium (Manual))   Pulse 82   Ht 5' 11" (1.803 m)   Wt 99.5 kg (219 lb 5.7 oz)   SpO2 97%   BMI 30.59 kg/m²     Hemoglobin A1C   Date Value Ref Range Status   05/16/2024 6.9 (H) 4.0 - 5.6 % Final     Comment:     ADA Screening Guidelines:  5.7-6.4%  Consistent with prediabetes  >or=6.5%  Consistent with diabetes    High levels of fetal hemoglobin interfere with the HbA1C  assay. Heterozygous hemoglobin variants (HbS, HgC, etc)do  not significantly interfere with this assay.   However, presence of multiple variants may affect accuracy.     03/13/2024 8.1 (H) 4.0 - 5.6 % Final     Comment:     ADA Screening Guidelines:  5.7-6.4%  Consistent with prediabetes  >or=6.5%  Consistent with diabetes    High levels of fetal hemoglobin interfere with the HbA1C  assay. Heterozygous hemoglobin variants (HbS, HgC, etc)do  not significantly interfere with this assay.   However, presence of multiple variants may affect accuracy.     12/13/2023 8.8 (H) 4.0 - 5.6 % Final     Comment:     ADA Screening Guidelines:  5.7-6.4%  Consistent with prediabetes  >or=6.5%  Consistent with diabetes    High levels of fetal hemoglobin interfere with the HbA1C  assay. Heterozygous hemoglobin variants (HbS, HgC, etc)do  not significantly interfere with this assay.   However, presence of multiple variants may affect accuracy.          Chemistry        Component Value Date/Time     05/16/2024 0812    K 4.8 05/16/2024 0812     " 05/16/2024 0812    CO2 24 05/16/2024 0812    BUN 19 05/16/2024 0812    CREATININE 1.3 05/16/2024 0812     (H) 05/16/2024 0812        Component Value Date/Time    CALCIUM 10.7 (H) 05/16/2024 0812    ALKPHOS 47 (L) 05/16/2024 0812    AST 12 05/16/2024 0812    ALT 17 05/16/2024 0812    BILITOT 0.6 05/16/2024 0812    ESTGFRAFRICA >60.0 03/23/2022 0940    EGFRNONAA >60.0 03/23/2022 0940           Lab Results   Component Value Date    TSH 1.317 05/16/2024      Lab Results   Component Value Date    CHOL 139 05/16/2024    CHOL 161 09/25/2023    CHOL 138 10/03/2022     Lab Results   Component Value Date    HDL 35 (L) 05/16/2024    HDL 42 09/25/2023    HDL 41 10/03/2022     Lab Results   Component Value Date    LDLCALC 71.0 05/16/2024    LDLCALC 88.2 09/25/2023    LDLCALC 73.8 10/03/2022     Lab Results   Component Value Date    TRIG 165 (H) 05/16/2024    TRIG 154 (H) 09/25/2023    TRIG 116 10/03/2022     Lab Results   Component Value Date    CHOLHDL 25.2 05/16/2024    CHOLHDL 26.1 09/25/2023    CHOLHDL 29.7 10/03/2022         PHYSICAL EXAMINATION  Constitutional: Appears well, no distress. Reviewed vitals above.  Eyes: conjunctivae & lids intact; PERRLA, EOMs intact; optic discs   Neck: Supple, trachea midline.   Respiratory: CTA without wheezes, even and unlabored  Cardiovascular: RRR  Lymph: deferred   Skin: warm and dry; no injection site reactions, no acanthosis nigracans observed.  Neuro:patient alert and cooperative, normal affect; steady gait.  Psychiatric: judgement & insight intact, orientation of time, place & person intact, memory; mood & affect wnl     Diabetes Foot Exam:   Defer to next visit    Assessment/Plan  1. Type 2 diabetes mellitus without complication, without long-term current use of insulin  Hemoglobin A1C    Microalbumin/Creatinine Ratio, Urine      2. Overweight (BMI 25.0-29.9)        3. Paroxysmal atrial fibrillation        4. Essential hypertension        5. Chronic combined systolic and  diastolic heart failure        6. Anticoagulated          1-6. Follow up in 4 months w/ me   A1c urine mac in 4 months   Cut back metformin 500 mg bid  Continue jardiance 25 mg - kidney functions - much improved 40s- > 60   F/u with cards   On entresto on eliquis   A1c goal less than 7%  Much improved :)  Discussed dietary habits, stressors  Colonoscopy 2024-eliquis, mounjaro guidance    In 4 months

## 2024-05-30 NOTE — CONSULTS
[facility] CARDIOLOGY  Response for E-Consult     Patient Name: Michael Slaughter Jr.  MRN: [id number]  Primary Care Provider: Daniel Miranda MD   Requesting Provider: Oliver Casper APRN*  E-Consult to Cardiology  Consult performed by: Michael Palafox III, MD  Consult ordered by: Oliver Casper APRN, FNP        Recommendation:   As of my last clinical evaluation on 7/13/2023, Mr. Slaughter had no active cardiac complaints and was able to exercise to greater than 4 METs. He is able to proceed for this low risk procedure.    Patient is on apixaban which may be held, if necessary, for 48 hours prior to the procedure. Apixaban can restarted when felt safe to do so from a surgical perspective.    Continue beta-blockers perioperatively.    Severo Palafox MD  Cardiology      I did not speak to the requesting provider verbally about this.     *This eConsult is based on the clinical data available to me and is furnished without benefit of a physical examination. The eConsult will need to be interpreted in light of any clinical issues or changes in patient status not available to me at the time of filing this eConsults. Significant changes in patient condition or level of acuity should result in immediate formal consultation and reevaluation. Please alert me if you have further questions.    Thank you for this eConsult referral.     Time for eConsult: 6 minutes    Michael Palafox MD  [facility] CARDIOLOGY

## 2024-05-30 NOTE — Clinical Note
A1c looks awesome, Dr. Miranda Placed colonoscopy-pt needs guidance for eliquis-Dr. Palafox Will schedule with wife

## 2024-05-30 NOTE — PATIENT INSTRUCTIONS
Follow up in 4 -5 months w/Irielle   A1c urine mac in 4-5 months     Lab Results   Component Value Date    HGBA1C 6.9 (H) 05/16/2024     Goal less than 7%    [email or web address]  Eat fit jovany  MyPictoramapal jovany  [email or web address]    mySugr jovany     Goal  no higher than 180     Metformin 500 mg twice a day  Mounjaro 2.5 mg weekly   Jardiance 25 mg daily   
done

## 2024-06-10 ENCOUNTER — PATIENT MESSAGE (OUTPATIENT)
Dept: INTERNAL MEDICINE | Facility: CLINIC | Age: 64
End: 2024-06-10
Payer: COMMERCIAL

## 2024-06-18 ENCOUNTER — PATIENT MESSAGE (OUTPATIENT)
Dept: INTERNAL MEDICINE | Facility: CLINIC | Age: 64
End: 2024-06-18
Payer: COMMERCIAL

## 2024-06-18 DIAGNOSIS — E11.9 TYPE 2 DIABETES MELLITUS WITHOUT COMPLICATION, WITHOUT LONG-TERM CURRENT USE OF INSULIN: ICD-10-CM

## 2024-06-18 RX ORDER — TIRZEPATIDE 2.5 MG/.5ML
2.5 INJECTION, SOLUTION SUBCUTANEOUS
Qty: 4 PEN | Refills: 4 | Status: SHIPPED | OUTPATIENT
Start: 2024-06-18

## 2024-06-19 ENCOUNTER — NURSE TRIAGE (OUTPATIENT)
Dept: ADMINISTRATIVE | Facility: CLINIC | Age: 64
End: 2024-06-19
Payer: COMMERCIAL

## 2024-06-19 ENCOUNTER — TELEPHONE (OUTPATIENT)
Dept: INTERNAL MEDICINE | Facility: CLINIC | Age: 64
End: 2024-06-19
Payer: COMMERCIAL

## 2024-06-19 ENCOUNTER — PATIENT MESSAGE (OUTPATIENT)
Dept: INTERNAL MEDICINE | Facility: CLINIC | Age: 64
End: 2024-06-19
Payer: COMMERCIAL

## 2024-06-19 NOTE — TELEPHONE ENCOUNTER
Notified pt of provider instructions to STOP evening dose of Metformin- stick to metformin 500 mg once a day and he must eat every 4-6 hrs and watch carb intake while on Jardiance.  You must  have fuel 15 gm carb snack with cutting grass/exertion.      He has to watch jardiance, have fuel 15 gm carb snack with cutting grass/exertion.   If having lower readings -cut metformin to once a day.   Be sure to eat q 4-6 hours

## 2024-06-19 NOTE — TELEPHONE ENCOUNTER
Last dose of Mounjaro was Sun 06/16- last dose of Metformin was Mon 06/14 - last dose of Jardiance was yesterday near 10 am.  Current glucose is 142 w/o any medication today.  He is still feeling a little jittery.    Reported this info to JARED Ricketts then transferred him to RN on call staff for triage.  Pt did say he will eat within 45 mins.

## 2024-06-19 NOTE — TELEPHONE ENCOUNTER
Attempted to call pt back about woosie feeling from meds but no response at this time. Mailbox was full.

## 2024-06-19 NOTE — TELEPHONE ENCOUNTER
Pt feeling a little jittery/nervous. Was worse earlier today. Current blood sugar 157. Feels like it may be related to mounjaro that he started a couple of months ago. Also takes metformin and jardiance. Pt ate just now, had chicken nuggets.    Dispo- call pcp within 24 hours. Pt advised message will be routed to office staff, pt SOLOMON. Care advice given.  Reason for Disposition   [1] Caller has NON-URGENT medication or insulin pump question AND [2] triager unable to answer question    Additional Information   Negative: Unconscious or difficult to awaken   Negative: Seizure occurs   Negative: Acting confused (e.g., disoriented, slurred speech)   Negative: Very weak (e.g., can't stand)   Negative: Sounds like a life-threatening emergency to the triager   Negative: [1] Vomiting AND [2] signs of dehydration (e.g., very dry mouth, lightheaded, dark urine)   Negative: [1] Low blood sugar symptoms persist > 30 minutes AND [2] using low blood sugar Care Advice   Negative: [1] Low blood glucose (70 mg/dl [3.9 mmol/l] or below) persists > 30 minutes AND [2] using low blood sugar Care Advice   Negative: Patient sounds very sick or weak to the triager   Negative: [1] Low blood sugar symptoms with no other adult present AND [2] hasn't tried Care Advice   Negative: [1] Low blood glucose (70 mg/dl [3.9 mmol/l] or below)) with no other adult present AND [2] hasn't tried Care Advice   Negative: Diabetes drug error or overdose (e.g., insulin error or extra dose)   Negative: [1] Caller has URGENT medication or insulin pump question AND [2] triager unable to answer question   Negative: [1] Blood glucose 70  mg/dL (3.9 mmol/L) or below OR symptomatic, now improved with Care Advice AND [2] cause unknown    Protocols used: Diabetes - Low Blood Sugar[facility]

## 2024-06-19 NOTE — TELEPHONE ENCOUNTER
"Pt states he has been having episodes of "feeling very Woosie, nervous like. Light headed. A little nausea" states his blood sugars are ranging about 125 but he does take Mounjaro, metformin, and jardiance   "

## 2024-06-20 NOTE — TELEPHONE ENCOUNTER
It looks like message went to Sunil Casper NP yesterday because she has been treating him for his diabetes.

## 2024-06-20 NOTE — TELEPHONE ENCOUNTER
Pt spoke to Niru GARSIA yesterday. See note. He states that he is doing better now he feels like he is taking too much diabetes medication so he stopped pm Metformin. He could not provide me with a current blood sugar reading

## 2024-06-20 NOTE — TELEPHONE ENCOUNTER
There was no answer and unable to leave a message on his personal recorder due to his mail box being full.

## 2024-06-27 DIAGNOSIS — K21.9 GASTROESOPHAGEAL REFLUX DISEASE, UNSPECIFIED WHETHER ESOPHAGITIS PRESENT: ICD-10-CM

## 2024-06-27 DIAGNOSIS — E11.9 TYPE 2 DIABETES MELLITUS WITHOUT COMPLICATION, WITHOUT LONG-TERM CURRENT USE OF INSULIN: ICD-10-CM

## 2024-06-27 RX ORDER — ATORVASTATIN CALCIUM 10 MG/1
10 TABLET, FILM COATED ORAL DAILY
Qty: 90 TABLET | Refills: 3 | Status: SHIPPED | OUTPATIENT
Start: 2024-06-27

## 2024-06-27 RX ORDER — PANTOPRAZOLE SODIUM 40 MG/1
40 TABLET, DELAYED RELEASE ORAL DAILY
Qty: 90 TABLET | Refills: 3 | Status: SHIPPED | OUTPATIENT
Start: 2024-06-27

## 2024-06-27 NOTE — TELEPHONE ENCOUNTER
Refill Routing Note   Medication(s) are not appropriate for processing by [facility] for the following reason(s):        No active prescription written by provider: not yet signed by current PCP    [facility] action(s):  Defer      Medication Therapy Plan:         Appointments  past 12m or future 3m with PCP    Date Provider   Last Visit   5/14/2024 Daniel Miranda MD   Next Visit   7/9/2024 Daniel Miranda MD   ED visits in past 90 days: 0        Note composed:3:41 PM 06/27/2024

## 2024-06-27 NOTE — TELEPHONE ENCOUNTER
No care due was identified.  [facility] Embedded Care Due Messages. Reference number: [id number].   6/27/2024 2:02:36 PM CDT

## 2024-07-09 ENCOUNTER — OFFICE VISIT (OUTPATIENT)
Dept: INTERNAL MEDICINE | Facility: CLINIC | Age: 64
End: 2024-07-09
Payer: COMMERCIAL

## 2024-07-09 VITALS
HEART RATE: 68 BPM | HEIGHT: 71 IN | BODY MASS INDEX: 30.27 KG/M2 | WEIGHT: 216.25 LBS | DIASTOLIC BLOOD PRESSURE: 62 MMHG | OXYGEN SATURATION: 95 % | SYSTOLIC BLOOD PRESSURE: 104 MMHG

## 2024-07-09 DIAGNOSIS — H61.23 BILATERAL IMPACTED CERUMEN: ICD-10-CM

## 2024-07-09 DIAGNOSIS — Z00.00 ENCOUNTER FOR ANNUAL GENERAL MEDICAL EXAMINATION WITHOUT ABNORMAL FINDINGS IN ADULT: Primary | ICD-10-CM

## 2024-07-09 DIAGNOSIS — I48.0 PAROXYSMAL ATRIAL FIBRILLATION: ICD-10-CM

## 2024-07-09 DIAGNOSIS — E66.09 CLASS 1 OBESITY DUE TO EXCESS CALORIES WITH SERIOUS COMORBIDITY AND BODY MASS INDEX (BMI) OF 30.0 TO 30.9 IN ADULT: ICD-10-CM

## 2024-07-09 DIAGNOSIS — Z79.01 ANTICOAGULATED: ICD-10-CM

## 2024-07-09 DIAGNOSIS — K21.9 GASTROESOPHAGEAL REFLUX DISEASE WITHOUT ESOPHAGITIS: ICD-10-CM

## 2024-07-09 DIAGNOSIS — E11.9 TYPE 2 DIABETES MELLITUS WITHOUT COMPLICATION, WITHOUT LONG-TERM CURRENT USE OF INSULIN: ICD-10-CM

## 2024-07-09 DIAGNOSIS — I10 ESSENTIAL HYPERTENSION: ICD-10-CM

## 2024-07-09 PROCEDURE — 1159F MED LIST DOCD IN RCRD: CPT | Mod: CPTII,S$GLB,, | Performed by: FAMILY MEDICINE

## 2024-07-09 PROCEDURE — 3008F BODY MASS INDEX DOCD: CPT | Mod: CPTII,S$GLB,, | Performed by: FAMILY MEDICINE

## 2024-07-09 PROCEDURE — 99397 PER PM REEVAL EST PAT 65+ YR: CPT | Mod: S$GLB,,, | Performed by: FAMILY MEDICINE

## 2024-07-09 PROCEDURE — 4010F ACE/ARB THERAPY RXD/TAKEN: CPT | Mod: CPTII,S$GLB,, | Performed by: FAMILY MEDICINE

## 2024-07-09 PROCEDURE — 1160F RVW MEDS BY RX/DR IN RCRD: CPT | Mod: CPTII,S$GLB,, | Performed by: FAMILY MEDICINE

## 2024-07-09 PROCEDURE — 3044F HG A1C LEVEL LT 7.0%: CPT | Mod: CPTII,S$GLB,, | Performed by: FAMILY MEDICINE

## 2024-07-09 PROCEDURE — 99999 PR PBB SHADOW E&M-EST. PATIENT-LVL IV: CPT | Mod: PBBFAC,,, | Performed by: FAMILY MEDICINE

## 2024-07-09 PROCEDURE — 3078F DIAST BP <80 MM HG: CPT | Mod: CPTII,S$GLB,, | Performed by: FAMILY MEDICINE

## 2024-07-09 PROCEDURE — 1126F AMNT PAIN NOTED NONE PRSNT: CPT | Mod: CPTII,S$GLB,, | Performed by: FAMILY MEDICINE

## 2024-07-09 PROCEDURE — 3074F SYST BP LT 130 MM HG: CPT | Mod: CPTII,S$GLB,, | Performed by: FAMILY MEDICINE

## 2024-07-09 NOTE — PROGRESS NOTES
"Subjective:     Patient ID: Michael Slaughter Jr. is a 63 y.o. male.   Chief Complaint: Follow-up    HPI:  Pt presenting for annual visit + labs. LOV w/ me was 5/14/24.    Since last visit pt was evaluated by DM NP for dizziness, thought d/t diabetic polypharmacy. His PM metformin was dc'd and advised of dietary changes to make to prevent symptomatic hypoglycemia. He has taken himself off of metformin entirely. Reports he has been in the green range on his CGM 87% of the time. Typically in the 120s. No further episodes.    Fasting labs have been completed. A1c was 6.9% on last check. He is due for DM eye exam.     Still having itching in his R ear. Reports fullness in the ear as well. Had cerumen extraction at last visit and says it has all come back in the interim.           Review of Systems   Constitutional:  Negative for chills, fatigue and fever.   HENT:  Negative for nasal congestion, ear pain, postnasal drip and sore throat.    Eyes:  Negative for visual disturbance.   Respiratory:  Negative for cough, shortness of breath and wheezing.    Cardiovascular:  Negative for chest pain and palpitations.   Gastrointestinal:  Negative for abdominal pain, change in bowel habit, constipation, diarrhea, nausea and vomiting.   Genitourinary:  Negative for dysuria and hematuria.   Musculoskeletal:  Negative for back pain, leg pain and neck pain.   Neurological:  Negative for dizziness, numbness and headaches.   Hematological:  Negative for adenopathy.   Psychiatric/Behavioral:  Negative for dysphoric mood, sleep disturbance and suicidal ideas. The patient is not nervous/anxious.           Objective:      Vitals:    07/09/24 1445   BP: 104/62   BP Location: Right arm   Patient Position: Sitting   BP Method: Large (Manual)   Pulse: 68   SpO2: 95%   Weight: 98.1 kg (216 lb 4.3 oz)   Height: 5' 11" (1.803 m)      Physical Exam  Vitals and nursing note reviewed.   Constitutional:       General: He is not in acute distress.     " Appearance: Normal appearance. He is not ill-appearing.   HENT:      Head: Normocephalic and atraumatic.      Right Ear: Tympanic membrane, ear canal and external ear normal. There is no impacted cerumen.      Left Ear: Tympanic membrane, ear canal and external ear normal. There is no impacted cerumen.      Nose: Nose normal. No congestion or rhinorrhea.      Mouth/Throat:      Mouth: Mucous membranes are moist.      Pharynx: Oropharynx is clear. No oropharyngeal exudate or posterior oropharyngeal erythema.   Eyes:      General: No scleral icterus.        Right eye: No discharge.         Left eye: No discharge.      Conjunctiva/sclera: Conjunctivae normal.   Neck:      Thyroid: No thyroid mass, thyromegaly or thyroid tenderness.   Cardiovascular:      Rate and Rhythm: Normal rate and regular rhythm.      Pulses: Normal pulses.      Heart sounds: Normal heart sounds. No murmur heard.     No friction rub. No gallop.   Pulmonary:      Effort: Pulmonary effort is normal. No respiratory distress.      Breath sounds: Normal breath sounds. No wheezing, rhonchi or rales.   Abdominal:      General: Abdomen is flat. Bowel sounds are normal. There is no distension.      Palpations: Abdomen is soft. There is no mass.      Tenderness: There is no abdominal tenderness. There is no guarding.   Musculoskeletal:         General: No swelling or deformity. Normal range of motion.      Cervical back: Normal range of motion and neck supple. No tenderness.   Lymphadenopathy:      Cervical: No cervical adenopathy.   Skin:     General: Skin is warm and dry.   Neurological:      General: No focal deficit present.      Mental Status: He is alert and oriented to person, place, and time. Mental status is at baseline.      Gait: Gait normal.      Deep Tendon Reflexes: Reflexes normal.   Psychiatric:         Mood and Affect: Mood normal.         Behavior: Behavior normal.         Thought Content: Thought content normal.         Judgment:  Judgment normal.           Assessment:       Problem List Items Addressed This Visit          Cardiac/Vascular    Essential hypertension    Paroxysmal atrial fibrillation       Hematology    Anticoagulated       Endocrine    Type 2 diabetes mellitus without complication, without long-term current use of insulin    Relevant Orders    Diabetic Eye Screening Photo    Class 1 obesity due to excess calories with serious comorbidity and body mass index (BMI) of 30.0 to 30.9 in adult       GI    GERD (gastroesophageal reflux disease)     Other Visit Diagnoses       Encounter for annual general medical examination without abnormal findings in adult    -  Primary    Bilateral impacted cerumen        Relevant Orders    Ambulatory referral/consult to ENT              Plan:       1. Encounter for annual general medical examination without abnormal findings in adult  Health maintenance updated  Chronic issues reviewed  Fasting labs previously completed, reviewed today w/ pt    2. Type 2 diabetes mellitus without complication, without long-term current use of insulin  Stable, no acute issues  No refill(s) needed  Continue to hold metformin  - Diabetic Eye Screening Photo; Future    3. Class 1 obesity due to excess calories with serious comorbidity and body mass index (BMI) of 30.0 to 30.9 in adult  Encourage healthy diet and exercise habits to optimize health and minimize chance of comorbidity development    4. Gastroesophageal reflux disease without esophagitis  Stable, no acute issues  No refill(s) needed    5. Anticoagulated  Stable, no acute issues  No refill(s) needed    6. Paroxysmal atrial fibrillation  Stable, no acute issues  No refill(s) needed    7. Essential hypertension  Stable, no acute issues  No refill(s) needed    8. Bilateral impacted cerumen  B/l cerumen noticed again, still w/ chronic skin irritation, likely from continued neosporin use  Ref to ENT  - Ambulatory referral/consult to ENT; Future          Follow  up in about 6 months (around 1/9/2025) for diabetic visit.     Daniel Miranda MD, [facility]  Family Medicine Physician  [facility]  07/09/2024      This note was produced using voice recognition technology. Some typographical or syntax errors may be present, despite best efforts with proofreading.

## 2024-08-01 ENCOUNTER — CLINICAL SUPPORT (OUTPATIENT)
Dept: ENDOSCOPY | Facility: HOSPITAL | Age: 64
End: 2024-08-01
Payer: COMMERCIAL

## 2024-08-01 DIAGNOSIS — Z12.11 COLON CANCER SCREENING: ICD-10-CM

## 2024-08-01 RX ORDER — POLYETHYLENE GLYCOL 3350, SODIUM SULFATE ANHYDROUS, SODIUM BICARBONATE, SODIUM CHLORIDE, POTASSIUM CHLORIDE 236; 22.74; 6.74; 5.86; 2.97 G/4L; G/4L; G/4L; G/4L; G/4L
4 POWDER, FOR SOLUTION ORAL ONCE
Qty: 4000 ML | Refills: 0 | Status: SHIPPED | OUTPATIENT
Start: 2024-08-01 | End: 2024-08-01

## 2024-08-05 ENCOUNTER — TELEPHONE (OUTPATIENT)
Dept: ENDOSCOPY | Facility: HOSPITAL | Age: 64
End: 2024-08-05
Payer: COMMERCIAL

## 2024-08-16 ENCOUNTER — TELEPHONE (OUTPATIENT)
Dept: ENDOSCOPY | Facility: HOSPITAL | Age: 64
End: 2024-08-16
Payer: COMMERCIAL

## 2024-08-16 ENCOUNTER — E-CONSULT (OUTPATIENT)
Dept: CARDIOLOGY | Facility: CLINIC | Age: 64
End: 2024-08-16
Payer: COMMERCIAL

## 2024-08-16 DIAGNOSIS — I50.42 CHRONIC COMBINED SYSTOLIC AND DIASTOLIC HEART FAILURE: Primary | ICD-10-CM

## 2024-08-16 DIAGNOSIS — I48.0 PAROXYSMAL ATRIAL FIBRILLATION: ICD-10-CM

## 2024-08-16 NOTE — CONSULTS
[facility] - Cardiology - [facility]  Response for E-Consult     Patient Name: Michael Slaughter Jr.  MRN: [id number]  Primary Care Provider: Daniel Miranda MD   Requesting Provider: Joan Vu NP  E-Consult to Cardiology  Consult performed by: Michael Palafox III, MD  Consult ordered by: Joan Vu NP      Recommendation:   He is on apixaban 5mg BID for stroke prophylaxis due to atrial fibrillation.  No recent DCCV/PVI.    He can hold eliquis for 48 hours prior to scheduled colonoscopy.   He should restart apixaban at current dose as soon as possible following the procedure.    Total time of Consultation: 5 minute    I did not speak to the requesting provider verbally about this.     *This eConsult is based on the clinical data available to me and is furnished without benefit of a physical examination. The eConsult will need to be interpreted in light of any clinical issues or changes in patient status not available to me at the time of filing this eConsults. Significant changes in patient condition or level of acuity should result in immediate formal consultation and reevaluation. Please alert me if you have further questions.    Thank you for this eConsult referral.     MD Reji Puga charly - Cardiology [address]

## 2024-08-16 NOTE — TELEPHONE ENCOUNTER
----- Message from Yeny Jonas RN sent at 8/5/2024  4:52 PM CDT -----  Regarding: 10/14/24    BT/CL  The patient is currently under an internal cardiologist Dr FAYE tovar and requires a blood thinner Eliquis (apixaban) & Cardiac Clearance for their upcoming scheduled Colonoscopy on 10/14/24.

## 2024-10-07 ENCOUNTER — TELEPHONE (OUTPATIENT)
Dept: ENDOSCOPY | Facility: HOSPITAL | Age: 64
End: 2024-10-07
Payer: COMMERCIAL

## 2024-10-07 ENCOUNTER — LAB VISIT (OUTPATIENT)
Dept: LAB | Facility: HOSPITAL | Age: 64
End: 2024-10-07
Payer: COMMERCIAL

## 2024-10-07 ENCOUNTER — PATIENT MESSAGE (OUTPATIENT)
Dept: ENDOSCOPY | Facility: HOSPITAL | Age: 64
End: 2024-10-07
Payer: COMMERCIAL

## 2024-10-07 DIAGNOSIS — E11.9 TYPE 2 DIABETES MELLITUS WITHOUT COMPLICATION, WITHOUT LONG-TERM CURRENT USE OF INSULIN: ICD-10-CM

## 2024-10-07 LAB
ESTIMATED AVG GLUCOSE: 157 MG/DL (ref 68–131)
HBA1C MFR BLD: 7.1 % (ref 4–5.6)

## 2024-10-07 PROCEDURE — 83036 HEMOGLOBIN GLYCOSYLATED A1C: CPT | Performed by: NURSE PRACTITIONER

## 2024-10-07 PROCEDURE — 36415 COLL VENOUS BLD VENIPUNCTURE: CPT | Performed by: NURSE PRACTITIONER

## 2024-10-07 NOTE — TELEPHONE ENCOUNTER
Spoke to patient  for pre-call to confirm scheduled Colonoscopy and patient verbalized understanding of the following:       Date of Procedure (s)  verified 10/14/24  Arrival Time 9:20 AM verified.  Location of Procedure(s) [facility] [address] Floor verified.  NPO status reinforced. Ok to continue clear liquids up until 4 hours prior to the Endoscopy procedure.     Confirmed Pt is currently taking Mounjaro (Tirzepatide), Pt instructed to stop stop taking Mounjaro (Tirzepatide) on 10/5/24.     Pt is taking  Eliquis (apixaban) , Pt instructed to stop taking Eliquis (apixaban)  on:  10/12/24, per endoscopy protocol.  Approval to hold received from Dr. Palafox.     Pt confirmed receipt of prep instructions and Rx prep (if applicable).  Instructions provided to patient via [facility]  Pt confirmed ride home after procedure if procedure requires anesthesia.   Pre-call screening questionnaire reviewed and completed with patient.   Appointment details are tentative, including check-in time.  If the patient begins taking any blood thinning medications, injectable weight loss/diabetes medications (other than insulin), or Adipex (phentermine) patient was instructed to contact the endoscopy scheduling department as soon as possible.  Patient was advised to call the endoscopy scheduling department if any questions or concerns arise.       [facility] Endoscopy Scheduling Department

## 2024-10-10 ENCOUNTER — PATIENT MESSAGE (OUTPATIENT)
Dept: INTERNAL MEDICINE | Facility: CLINIC | Age: 64
End: 2024-10-10
Payer: COMMERCIAL

## 2024-10-10 NOTE — PROGRESS NOTES
CHIEF COMPLAINT: Type 2 Diabetes     HPI: Mr. Michael Slaughter Jr. is a 64 y.o. male who was diagnosed with Type 2 DM in 2009.    41 years. 3 children, 2 daughters- Rns, 1 son- .    Last seen by me in spring 2024.  Being seen by me again today.   Has colonoscopy, holding mounjaro, holding eliquis for 3 days.    Cutting grass-- 1x a week.  [place]- walking 5 x a week.   Using [employer] 3   Watching sugar intake.   Avg 144 mg/dl   Gmi 6.8%   Glucose variability 20.1%   TIR 88%   TAR 12%   C/o right sided pain, 5/10 x 2 weeks, feels it is more muscular.     Lab Results   Component Value Date    HGBA1C 7.1 (H) 10/07/2024     See media    Hobby: fishing    Egfr declined, CR 1.2 to 1.6 -metformin -off from spring 2024.     1 cup --Coffee , abhijeet's creamer  Admits to eating sweets, higher carbs  Grilled meats, air frying  Chicken   Not healthiest options  Looking to retire in 1 year ago.  Water- drinks a lot throughout the day        PREVIOUS DIABETES MEDICATIONS TRIED  Ozempic - nausea  Jardiance 25 mg - polyuria-discomfort  Metformin   Mounjaro     CURRENT DIABETIC MEDS: jardiance 10 mg daily , mounjaro 2.5 mg weekly - no nausea     Diabetes Management Status    Statin: Taking  ACE/ARB: Not taking    Screening or Prevention Patient's value Goal Complete/Controlled?   HgA1C Testing and Control   Lab Results   Component Value Date    HGBA1C 7.1 (H) 10/07/2024      Annually/Less than 8% No   Lipid profile : 05/16/2024 Annually Yes   LDL control Lab Results   Component Value Date    LDLCALC 71.0 05/16/2024    Annually/Less than 100 mg/dl  Yes   Nephropathy screening Lab Results   Component Value Date    LABMICR 9.0 03/27/2023     Lab Results   Component Value Date    PROTEINUA Negative 09/25/2023    Annually Yes   Blood pressure BP Readings from Last 1 Encounters:   07/09/24 104/62    Less than 140/90 Yes   Dilated retinal exam : 03/27/2023 Annually Yes   Foot exam   : 01/30/2024 Annually Yes  "    REVIEW OF SYSTEMS  General: no weakness, fatigue, + weight changes 4-7# loss   Eyes: no double or blurred vision, eye pain, or redness  Cardiovascular: no chest pain, palpitations, edema, or murmurs.   Respiratory: no cough or dyspnea.   GI: no heartburn, nausea, or changes in bowel patterns; good appetite.   Skin: no rashes, dryness, itching, or reactions at insulin injection sites.  Neuro: no numbness, tingling, tremors, or vertigo.   Endocrine: no polyuria, polydipsia, polyphagia, heat or cold intolerance.     Vital Signs  /60   Pulse 60   Ht 5' 11" (1.803 m)   Wt 96.5 kg (212 lb 11.9 oz)   SpO2 98%   BMI 29.67 kg/m²     Hemoglobin A1C   Date Value Ref Range Status   10/07/2024 7.1 (H) 4.0 - 5.6 % Final     Comment:     ADA Screening Guidelines:  5.7-6.4%  Consistent with prediabetes  >or=6.5%  Consistent with diabetes    High levels of fetal hemoglobin interfere with the HbA1C  assay. Heterozygous hemoglobin variants (HbS, HgC, etc)do  not significantly interfere with this assay.   However, presence of multiple variants may affect accuracy.     05/16/2024 6.9 (H) 4.0 - 5.6 % Final     Comment:     ADA Screening Guidelines:  5.7-6.4%  Consistent with prediabetes  >or=6.5%  Consistent with diabetes    High levels of fetal hemoglobin interfere with the HbA1C  assay. Heterozygous hemoglobin variants (HbS, HgC, etc)do  not significantly interfere with this assay.   However, presence of multiple variants may affect accuracy.     03/13/2024 8.1 (H) 4.0 - 5.6 % Final     Comment:     ADA Screening Guidelines:  5.7-6.4%  Consistent with prediabetes  >or=6.5%  Consistent with diabetes    High levels of fetal hemoglobin interfere with the HbA1C  assay. Heterozygous hemoglobin variants (HbS, HgC, etc)do  not significantly interfere with this assay.   However, presence of multiple variants may affect accuracy.          Chemistry        Component Value Date/Time     05/16/2024 0812    K 4.8 05/16/2024 " 0812     05/16/2024 0812    CO2 24 05/16/2024 0812    BUN 19 05/16/2024 0812    CREATININE 1.3 05/16/2024 0812     (H) 05/16/2024 0812        Component Value Date/Time    CALCIUM 10.7 (H) 05/16/2024 0812    ALKPHOS 47 (L) 05/16/2024 0812    AST 12 05/16/2024 0812    ALT 17 05/16/2024 0812    BILITOT 0.6 05/16/2024 0812    ESTGFRAFRICA >60.0 03/23/2022 0940    EGFRNONAA >60.0 03/23/2022 0940           Lab Results   Component Value Date    TSH 1.317 05/16/2024      Lab Results   Component Value Date    CHOL 139 05/16/2024    CHOL 161 09/25/2023    CHOL 138 10/03/2022     Lab Results   Component Value Date    HDL 35 (L) 05/16/2024    HDL 42 09/25/2023    HDL 41 10/03/2022     Lab Results   Component Value Date    LDLCALC 71.0 05/16/2024    LDLCALC 88.2 09/25/2023    LDLCALC 73.8 10/03/2022     Lab Results   Component Value Date    TRIG 165 (H) 05/16/2024    TRIG 154 (H) 09/25/2023    TRIG 116 10/03/2022     Lab Results   Component Value Date    CHOLHDL 25.2 05/16/2024    CHOLHDL 26.1 09/25/2023    CHOLHDL 29.7 10/03/2022         PHYSICAL EXAMINATION  Constitutional: Appears well, no distress. Reviewed vitals above.  Eyes: conjunctivae & lids intact; PERRLA, EOMs intact; optic discs   Neck: Supple, trachea midline.   Respiratory: CTA without wheezes, even and unlabored  Cardiovascular: RRR  Lymph: deferred   Skin: warm and dry; no injection site reactions, no acanthosis nigracans observed.  Neuro:patient alert and cooperative, normal affect; steady gait.  Psychiatric: judgement & insight intact, orientation of time, place & person intact, memory; mood & affect wnl     Diabetes Foot Exam:   Defer to next visit    Assessment/Plan  1. Type 2 diabetes mellitus without complication, without long-term current use of insulin  Hemoglobin A1C    Microalbumin/Creatinine Ratio, Urine    Comprehensive Metabolic Panel    AMYLASE    LIPASE    Hemoglobin A1C    Labs today  A1c in 4 months   A1c goal less than  7.5%    Continue regimen -watch mounjaro         2. Essential hypertension  Bp controlled  On entresto      3. Overweight 25-29.9 Body mass index is 29.67 kg/m².   May increase insulin resistance  On glp1a/gip       4. Paroxysmal atrial fibrillation  On eliquis       5. Chronic combined systolic and diastolic heart failure  F/u with cards  Stable       6. Anticoagulated  On eliquis       7. Right sided abdominal pain  Comprehensive Metabolic Panel    AMYLASE    LIPASE    Urinalysis    CULTURE, URINE        1

## 2024-10-11 ENCOUNTER — LAB VISIT (OUTPATIENT)
Dept: LAB | Facility: HOSPITAL | Age: 64
End: 2024-10-11
Payer: COMMERCIAL

## 2024-10-11 ENCOUNTER — OFFICE VISIT (OUTPATIENT)
Dept: INTERNAL MEDICINE | Facility: CLINIC | Age: 64
End: 2024-10-11
Payer: COMMERCIAL

## 2024-10-11 ENCOUNTER — PATIENT MESSAGE (OUTPATIENT)
Dept: INTERNAL MEDICINE | Facility: CLINIC | Age: 64
End: 2024-10-11

## 2024-10-11 ENCOUNTER — IMMUNIZATION (OUTPATIENT)
Dept: INTERNAL MEDICINE | Facility: CLINIC | Age: 64
End: 2024-10-11
Payer: COMMERCIAL

## 2024-10-11 VITALS
BODY MASS INDEX: 29.78 KG/M2 | DIASTOLIC BLOOD PRESSURE: 60 MMHG | OXYGEN SATURATION: 98 % | SYSTOLIC BLOOD PRESSURE: 104 MMHG | HEIGHT: 71 IN | WEIGHT: 212.75 LBS | HEART RATE: 60 BPM

## 2024-10-11 DIAGNOSIS — Z79.01 ANTICOAGULATED: ICD-10-CM

## 2024-10-11 DIAGNOSIS — R10.9 RIGHT SIDED ABDOMINAL PAIN: ICD-10-CM

## 2024-10-11 DIAGNOSIS — E11.9 TYPE 2 DIABETES MELLITUS WITHOUT COMPLICATION, WITHOUT LONG-TERM CURRENT USE OF INSULIN: ICD-10-CM

## 2024-10-11 DIAGNOSIS — Z23 NEED FOR VACCINATION: Primary | ICD-10-CM

## 2024-10-11 DIAGNOSIS — I10 ESSENTIAL HYPERTENSION: ICD-10-CM

## 2024-10-11 DIAGNOSIS — I50.42 CHRONIC COMBINED SYSTOLIC AND DIASTOLIC HEART FAILURE: ICD-10-CM

## 2024-10-11 DIAGNOSIS — E66.3 OVERWEIGHT (BMI 25.0-29.9): ICD-10-CM

## 2024-10-11 DIAGNOSIS — N40.1 BENIGN PROSTATIC HYPERPLASIA WITH URINARY FREQUENCY: ICD-10-CM

## 2024-10-11 DIAGNOSIS — E11.9 TYPE 2 DIABETES MELLITUS WITHOUT COMPLICATION, WITHOUT LONG-TERM CURRENT USE OF INSULIN: Primary | ICD-10-CM

## 2024-10-11 DIAGNOSIS — I48.0 PAROXYSMAL ATRIAL FIBRILLATION: ICD-10-CM

## 2024-10-11 DIAGNOSIS — R35.0 BENIGN PROSTATIC HYPERPLASIA WITH URINARY FREQUENCY: ICD-10-CM

## 2024-10-11 LAB
ALBUMIN SERPL BCP-MCNC: 4.2 G/DL (ref 3.5–5.2)
ALP SERPL-CCNC: 49 U/L (ref 55–135)
ALT SERPL W/O P-5'-P-CCNC: 19 U/L (ref 10–44)
AMYLASE SERPL-CCNC: 79 U/L (ref 20–110)
ANION GAP SERPL CALC-SCNC: 8 MMOL/L (ref 8–16)
AST SERPL-CCNC: 14 U/L (ref 10–40)
BILIRUB SERPL-MCNC: 0.9 MG/DL (ref 0.1–1)
BUN SERPL-MCNC: 17 MG/DL (ref 8–23)
CALCIUM SERPL-MCNC: 9.9 MG/DL (ref 8.7–10.5)
CHLORIDE SERPL-SCNC: 105 MMOL/L (ref 95–110)
CO2 SERPL-SCNC: 25 MMOL/L (ref 23–29)
CREAT SERPL-MCNC: 1.2 MG/DL (ref 0.5–1.4)
EST. GFR  (NO RACE VARIABLE): >60 ML/MIN/1.73 M^2
ESTIMATED AVG GLUCOSE: 157 MG/DL (ref 68–131)
GLUCOSE SERPL-MCNC: 112 MG/DL (ref 70–110)
HBA1C MFR BLD: 7.1 % (ref 4–5.6)
LIPASE SERPL-CCNC: 36 U/L (ref 4–60)
POTASSIUM SERPL-SCNC: 4.4 MMOL/L (ref 3.5–5.1)
PROT SERPL-MCNC: 7.4 G/DL (ref 6–8.4)
SODIUM SERPL-SCNC: 138 MMOL/L (ref 136–145)

## 2024-10-11 PROCEDURE — 83036 HEMOGLOBIN GLYCOSYLATED A1C: CPT | Performed by: NURSE PRACTITIONER

## 2024-10-11 PROCEDURE — 36415 COLL VENOUS BLD VENIPUNCTURE: CPT | Performed by: NURSE PRACTITIONER

## 2024-10-11 PROCEDURE — 82150 ASSAY OF AMYLASE: CPT | Performed by: NURSE PRACTITIONER

## 2024-10-11 PROCEDURE — 80053 COMPREHEN METABOLIC PANEL: CPT | Performed by: NURSE PRACTITIONER

## 2024-10-11 PROCEDURE — 83690 ASSAY OF LIPASE: CPT | Performed by: NURSE PRACTITIONER

## 2024-10-11 PROCEDURE — 99999 PR PBB SHADOW E&M-EST. PATIENT-LVL IV: CPT | Mod: PBBFAC,,, | Performed by: NURSE PRACTITIONER

## 2024-10-11 NOTE — TELEPHONE ENCOUNTER
No care due was identified.  [facility] Embedded Care Due Messages. Reference number: [id number].   10/11/2024 9:27:51 AM CDT

## 2024-10-11 NOTE — TELEPHONE ENCOUNTER
Refill Routing Note   Medication(s) are not appropriate for processing by [facility] for the following reason(s):        No active prescription written by provider    [facility] action(s):  Defer             Appointments  past 12m or future 3m with PCP    Date Provider   Last Visit   7/9/2024 Daniel Miranda MD   Next Visit   Visit date not found Daniel Miranda MD   ED visits in past 90 days: 0        Note composed:5:56 PM 10/11/2024

## 2024-10-11 NOTE — Clinical Note
A1c is stable , loves mary ann 3  Having right sided pain lately-doing work up for pancreatitic enzymes, ua , cmp today

## 2024-10-11 NOTE — PATIENT INSTRUCTIONS
Amylase, lipase, cmp , ua, urine cx today   A1c in 4 months  Follow up in 4 months w/Irielle   Influenza hd today   Optometry 2024    Lab Results   Component Value Date    HGBA1C 7.1 (H) 10/07/2024     Goal less than 7.5%    Goal  no higher than 180     [email or web address]  Eat fit jovany  [employer] j[employer]  [email or web address]    [employer] jovany       Jardiance 10 mg daily  Mounjaro 2.5 mg weekly

## 2024-10-13 ENCOUNTER — PATIENT MESSAGE (OUTPATIENT)
Dept: INTERNAL MEDICINE | Facility: CLINIC | Age: 64
End: 2024-10-13
Payer: COMMERCIAL

## 2024-10-14 ENCOUNTER — ANESTHESIA EVENT (OUTPATIENT)
Dept: ENDOSCOPY | Facility: HOSPITAL | Age: 64
End: 2024-10-14
Payer: COMMERCIAL

## 2024-10-14 ENCOUNTER — HOSPITAL ENCOUNTER (OUTPATIENT)
Facility: HOSPITAL | Age: 64
Discharge: HOME | End: 2024-10-14
Attending: COLON & RECTAL SURGERY | Admitting: COLON & RECTAL SURGERY
Payer: COMMERCIAL

## 2024-10-14 ENCOUNTER — TELEPHONE (OUTPATIENT)
Dept: INTERNAL MEDICINE | Facility: CLINIC | Age: 64
End: 2024-10-14
Payer: COMMERCIAL

## 2024-10-14 ENCOUNTER — ANESTHESIA (OUTPATIENT)
Dept: ENDOSCOPY | Facility: HOSPITAL | Age: 64
End: 2024-10-14
Payer: COMMERCIAL

## 2024-10-14 VITALS
TEMPERATURE: 97 F | HEART RATE: 60 BPM | BODY MASS INDEX: 31.21 KG/M2 | HEIGHT: 70 IN | SYSTOLIC BLOOD PRESSURE: 117 MMHG | RESPIRATION RATE: 17 BRPM | DIASTOLIC BLOOD PRESSURE: 66 MMHG | WEIGHT: 218 LBS | OXYGEN SATURATION: 98 %

## 2024-10-14 DIAGNOSIS — Z12.11 COLON CANCER SCREENING: Primary | ICD-10-CM

## 2024-10-14 DIAGNOSIS — R10.11 RIGHT UPPER QUADRANT ABDOMINAL PAIN: Primary | ICD-10-CM

## 2024-10-14 LAB — POCT GLUCOSE: 75 MG/DL (ref 70–110)

## 2024-10-14 PROCEDURE — 27201089 HC SNARE, DISP (ANY): Performed by: COLON & RECTAL SURGERY

## 2024-10-14 PROCEDURE — 37000008 HC ANESTHESIA 1ST 15 MINUTES: Performed by: COLON & RECTAL SURGERY

## 2024-10-14 PROCEDURE — 45385 COLONOSCOPY W/LESION REMOVAL: CPT | Mod: 33,,, | Performed by: COLON & RECTAL SURGERY

## 2024-10-14 PROCEDURE — E9220 PRA ENDO ANESTHESIA: HCPCS | Mod: ,,,

## 2024-10-14 PROCEDURE — 45385 COLONOSCOPY W/LESION REMOVAL: CPT | Mod: 33 | Performed by: COLON & RECTAL SURGERY

## 2024-10-14 PROCEDURE — 88305 TISSUE EXAM BY PATHOLOGIST: CPT | Mod: 26,,, | Performed by: PATHOLOGY

## 2024-10-14 PROCEDURE — 37000009 HC ANESTHESIA EA ADD 15 MINS: Performed by: COLON & RECTAL SURGERY

## 2024-10-14 PROCEDURE — 63600175 PHARM REV CODE 636 W HCPCS

## 2024-10-14 PROCEDURE — 88305 TISSUE EXAM BY PATHOLOGIST: CPT | Mod: 59 | Performed by: PATHOLOGY

## 2024-10-14 RX ORDER — SODIUM CHLORIDE 9 MG/ML
INJECTION, SOLUTION INTRAVENOUS CONTINUOUS
Status: CANCELLED | OUTPATIENT
Start: 2024-10-14

## 2024-10-14 RX ORDER — LIDOCAINE HYDROCHLORIDE 20 MG/ML
INJECTION INTRAVENOUS
Status: DISCONTINUED | OUTPATIENT
Start: 2024-10-14 | End: 2024-10-14

## 2024-10-14 RX ORDER — PROPOFOL 10 MG/ML
VIAL (ML) INTRAVENOUS
Status: DISCONTINUED | OUTPATIENT
Start: 2024-10-14 | End: 2024-10-14

## 2024-10-14 RX ADMIN — LIDOCAINE HYDROCHLORIDE 100 MG: 20 INJECTION INTRAVENOUS at 10:10

## 2024-10-14 RX ADMIN — PROPOFOL 50 MG: 10 INJECTION, EMULSION INTRAVENOUS at 10:10

## 2024-10-14 RX ADMIN — PROPOFOL 150 MCG/KG/MIN: 10 INJECTION, EMULSION INTRAVENOUS at 10:10

## 2024-10-14 NOTE — TRANSFER OF CARE
"Anesthesia Transfer of Care Note    Patient: Michael Slaughter Jr.    Procedure(s) Performed: Procedure(s) (LRB):  COLONOSCOPY (N/A)    Patient location: [facility]    Anesthesia Type: general    Transport from OR: Transported from OR on 2-3 L/min O2 by NC with adequate spontaneous ventilation    Post pain: adequate analgesia    Post assessment: no apparent anesthetic complications and tolerated procedure well    Post vital signs: stable    Level of consciousness: responds to stimulation    Nausea/Vomiting: no nausea/vomiting    Complications: none    Transfer of care protocol was followed      Last vitals: Visit Vitals  BP (!) 99/54 (BP Location: Left arm, Patient Position: Lying)   Pulse 78   Temp 36.3 °C (97.3 °F) (Temporal)   Resp 16   Ht 5' 10" (1.778 m)   Wt 98.9 kg (218 lb)   SpO2 96%   BMI 31.28 kg/m²     "

## 2024-10-14 NOTE — TELEPHONE ENCOUNTER
Spoke to pt. Pt stated he was returning a call. He believes the call had to do with scheduling a ct ordered by Ms Casper

## 2024-10-14 NOTE — TELEPHONE ENCOUNTER
----- Message from Pratibha sent at 10/14/2024 12:12 PM CDT -----  Contact: [phone]  Patient is returning a phone call.  Who left a message for the patient: n/a   Does patient know what this is regarding:  n/a   Would you like a call back, or a response through your [facility] portal?:   call back   Comments:      Pt misseda call and would like a call back

## 2024-10-14 NOTE — PROVATION PATIENT INSTRUCTIONS
Discharge Summary/Instructions after an Endoscopic Procedure  Patient Name: Michael Gómez MRN: [id number]  Patient YOB: 1960 Monday, October 14, 2024  Trent Sanderson MD  Dear patient,  As a result of recent federal legislation (The Federal Cures Act), you may   receive lab or pathology results from your procedure in your [facility]   account before your physician is able to contact you. Your physician or   their representative will relay the results to you with their   recommendations at their soonest availability.  Thank you,  RESTRICTIONS:  During your procedure today, you received medications for sedation.  These   medications may affect your judgment, balance and coordination.  Therefore,   for 24 hours, you have the following restrictions:   - DO NOT drive a car, operate machinery, make legal/financial decisions,   sign important papers or drink alcohol.    ACTIVITY:  Today: no heavy lifting, straining or running due to procedural   sedation/anesthesia.  The following day: return to full activity including work.  DIET:  Eat and drink normally unless instructed otherwise.     TREATMENT FOR COMMON SIDE EFFECTS:  - Mild abdominal pain, nausea, belching, bloating or excessive gas:  rest,   eat lightly and use a heating pad.  - Sore Throat: treat with throat lozenges and/or gargle with warm salt   water.  - Because air was used during the procedure, expelling large amounts of air   from your rectum or belching is normal.  - If a bowel prep was taken, you may not have a bowel movement for 1-3 days.    This is normal.  SYMPTOMS TO WATCH FOR AND REPORT TO YOUR PHYSICIAN:  1. Abdominal pain or bloating, other than gas cramps.  2. Chest pain.  3. Back pain.  4. Signs of infection such as: chills or fever occurring within 24 hours   after the procedure.  5. Rectal bleeding, which would show as bright red, maroon, or black stools.   (A tablespoon of blood from the rectum is not serious, especially if    hemorrhoids are present.)  6. Vomiting.  7. Weakness or dizziness.  GO DIRECTLY TO THE NEAREST EMERGENCY ROOM IF YOU HAVE ANY OF THE FOLLOWING:      Difficulty breathing              Chills and/or fever over 101 F   Persistent vomiting and/or vomiting blood   Severe abdominal pain   Severe chest pain   Black, tarry stools   Bleeding- more than one tablespoon   Any other symptom or condition that you feel may need urgent attention  Your doctor recommends these additional instructions:  If any biopsies were taken, your doctors clinic will contact you in 1 to 2   weeks with any results.  - Discharge patient to home (ambulatory).   - Patient has a contact number available for emergencies.  The signs and   symptoms of potential delayed complications were discussed with the   patient.  Return to normal activities tomorrow.  Written discharge   instructions were provided to the patient.   - Resume previous diet.   - Continue present medications.   - Return to primary care physician as previously scheduled.   - Repeat colonoscopy in 10 years for surveillance.   - Resume Eliquis (apixaban) at prior dose tomorrow.  For questions, problems or results please call your physician - Trent Sanderson MD at Work:  [phone].  [facility], EMERGENCY ROOM PHONE NUMBER: [phone]  IF A COMPLICATION OR EMERGENCY SITUATION ARISES AND YOU ARE UNABLE TO REACH   YOUR PHYSICIAN - GO DIRECTLY TO THE EMERGENCY ROOM.  Trent Sanderson MD  10/14/2024 11:19:43 AM  This report has been verified and signed electronically.  Dear patient,  As a result of recent federal legislation (The Federal Cures Act), you may   receive lab or pathology results from your procedure in your [facility]   account before your physician is able to contact you. Your physician or   their representative will relay the results to you with their   recommendations at their soonest availability.  Thank you,  PROVATION

## 2024-10-14 NOTE — PROGRESS NOTES
Tried to contact patient via home / cell phone. Will attempt to call again after lunch, thank you.

## 2024-10-14 NOTE — H&P
COLONOSCOPY HISTORY AND PHYSICAL EXAM    Procedure : Colonoscopy      INDICATIONS: asymptomatic screening exam    Family Hx of CRC: none    Last Colonoscopy:  2014  Findings: normal       Past Medical History:   Diagnosis Date    Diabetes mellitus type II     GERD (gastroesophageal reflux disease)     Sinusitis      Sedation Problems: NO  Family History   Problem Relation Name Age of Onset    Cancer Mother          breast    Birth defects Maternal Grandmother          brain tumor    Heart disease Neg Hx      Stroke Neg Hx      Amblyopia Neg Hx      Blindness Neg Hx      Cataracts Neg Hx      Glaucoma Neg Hx      Hypertension Neg Hx      Macular degeneration Neg Hx      Retinal detachment Neg Hx      Strabismus Neg Hx      Colon cancer Neg Hx      Esophageal cancer Neg Hx       Fam Hx of Sedation Problems: NO  Social History     Socioeconomic History    Marital status:    Occupational History     Employer: [employer]   Tobacco Use    Smoking status: Former     Types: Cigarettes    Smokeless tobacco: Never   Substance and Sexual Activity    Alcohol use: Yes     Comment: rarely    Drug use: No     Social Drivers of Health     Financial Resource Strain: Low Risk  (7/13/2023)    Overall Financial Resource Strain (CARDIA)     Difficulty of Paying Living Expenses: Not hard at all   Food Insecurity: No Food Insecurity (7/13/2023)    Hunger Vital Sign     Worried About Running Out of Food in the Last Year: Never true     Ran Out of Food in the Last Year: Never true   Transportation Needs: No Transportation Needs (7/13/2023)    PRAPARE - Transportation     Lack of Transportation (Medical): No     Lack of Transportation (Non-Medical): No   Physical Activity: Unknown (7/13/2023)    Exercise Vital Sign     Days of Exercise per Week: 4 days   Stress: Stress Concern Present (7/13/2023)    [employer] - Occupational Stress Questionnaire     Feeling of Stress : To some extent  "  Housing Stability: Low Risk  (7/13/2023)    Housing Stability Vital Sign     Unable to Pay for Housing in the Last Year: No     Number of Places Lived in the Last Year: 1     Unstable Housing in the Last Year: No       Review of Systems - Negative except   Respiratory ROS: no dyspnea  Cardiovascular ROS: no exertional chest pain  Gastrointestinal ROS: NO abdominal discomfort,  NO rectal bleeding  Musculoskeletal ROS: no muscular pain  Neurological ROS: no recent stroke    Physical Exam:  BP (!) 116/58   Pulse 66   Temp 97.5 °F (36.4 °C)   Resp 15   Ht 5' 10" (1.778 m)   Wt 98.9 kg (218 lb)   SpO2 98%   BMI 31.28 kg/m²   General: no distress  Head: normocephalic  Mallampati Score   Neck: supple, symmetrical, trachea midline  Lungs:  normal respiratory effort  Heart: regular rate and rhythm  Abdomen: soft, non-tender non-distented; bowel sounds normal; no masses,  no organomegaly  Extremities: no cyanosis or edema, or clubbing    ASA:  II    PLAN  COLONOSCOPY.    SedationPlan :MAC    The details of the procedure, the possible need for biopsy or polypectomy and the potential risks including bleeding, perforation, missed polyps were discussed in detail.      "

## 2024-10-14 NOTE — ANESTHESIA PREPROCEDURE EVALUATION
10/14/2024  Michael Slaughter Jr. is a 64 y.o., male here for screening colonoscopy.    Past Medical History:   Diagnosis Date    Diabetes mellitus type II     GERD (gastroesophageal reflux disease)     Sinusitis      Past Surgical History:   Procedure Laterality Date    COLONOSCOPY      HAND SURGERY      30 years ago             Pre-op Assessment          Review of Systems  Anesthesia Hx:  No problems with previous Anesthesia   History of prior surgery of interest to airway management or planning:          Denies Family Hx of Anesthesia complications.    Denies Personal Hx of Anesthesia complications.                    Cardiovascular:  Exercise tolerance: good   Hypertension    Dysrhythmias (paroxysmal a fib- on eliquis which he has held for this)   Denies Angina. CHF (stable and asymptomatic)      Denies CHAVEZ.                            Pulmonary:  Pulmonary Normal   Denies COPD.  Denies Asthma.   Denies Shortness of breath.  Denies Recent URI.                 Neurological:  Neurology Normal                                      Endocrine:  Diabetes               Physical Exam  General: Alert, Oriented and Well nourished    Airway:  Mallampati: II   Mouth Opening: Normal  TM Distance: Normal  Neck ROM: Normal ROM    Dental:  Dentures    Chest/Lungs:  Normal Respiratory Rate    Heart:  Rate: Normal  Rhythm: Regular Rhythm        Anesthesia Plan  Type of Anesthesia, risks & benefits discussed:    Anesthesia Type: Gen Natural Airway, MAC  Intra-op Monitoring Plan: Standard ASA Monitors  Post Op Pain Control Plan: multimodal analgesia and IV/PO Opioids PRN  Informed Consent: Informed consent signed with the Patient and all parties understand the risks and agree with anesthesia plan.  All questions answered.   ASA Score: 3  Day of Surgery Review of History & Physical: H&P Update referred to the  surgeon/provider.    Ready For Surgery From Anesthesia Perspective.     .

## 2024-10-15 LAB
FINAL PATHOLOGIC DIAGNOSIS: NORMAL
GROSS: NORMAL
Lab: NORMAL

## 2024-10-15 RX ORDER — TAMSULOSIN HYDROCHLORIDE 0.4 MG/1
0.4 CAPSULE ORAL DAILY
Qty: 90 CAPSULE | Refills: 3 | Status: SHIPPED | OUTPATIENT
Start: 2024-10-15 | End: 2025-10-15

## 2024-10-15 NOTE — ANESTHESIA POSTPROCEDURE EVALUATION
Anesthesia Post Evaluation    Patient: Michael Slaughter Jr.    Procedure(s) Performed: Procedure(s) (LRB):  COLONOSCOPY (N/A)    Final Anesthesia Type: general      Patient location during evaluation: PACU  Patient participation: Yes- Able to Participate  Level of consciousness: awake and alert  Post-procedure vital signs: reviewed and stable  Pain management: adequate  Airway patency: patent    PONV status at discharge: No PONV  Anesthetic complications: no      Cardiovascular status: blood pressure returned to baseline  Respiratory status: unassisted, room air and spontaneous ventilation  Hydration status: euvolemic  Follow-up not needed.              Vitals Value Taken Time   /66 10/14/24 1151   Temp 36.3 °C (97.3 °F) 10/14/24 1119   Pulse 60 10/14/24 1151   Resp 17 10/14/24 1151   SpO2 98 % 10/14/24 1151         Event Time   Out of Recovery 12:14:19         Pain/Josie Score: Josie Score: 10 (10/14/2024 11:34 AM)

## 2024-10-16 ENCOUNTER — PATIENT MESSAGE (OUTPATIENT)
Dept: SURGERY | Facility: CLINIC | Age: 64
End: 2024-10-16
Payer: COMMERCIAL

## 2024-11-08 RX ORDER — METOPROLOL SUCCINATE 50 MG/1
50 TABLET, EXTENDED RELEASE ORAL DAILY
Qty: 30 TABLET | Refills: 11 | Status: SHIPPED | OUTPATIENT
Start: 2024-11-08

## 2024-11-13 DIAGNOSIS — E11.9 TYPE 2 DIABETES MELLITUS WITHOUT COMPLICATION, WITHOUT LONG-TERM CURRENT USE OF INSULIN: ICD-10-CM

## 2024-11-13 RX ORDER — TIRZEPATIDE 2.5 MG/.5ML
2.5 INJECTION, SOLUTION SUBCUTANEOUS
Qty: 4 PEN | Refills: 4 | Status: SHIPPED | OUTPATIENT
Start: 2024-11-13

## 2024-11-14 ENCOUNTER — OFFICE VISIT (OUTPATIENT)
Dept: OTOLARYNGOLOGY | Facility: CLINIC | Age: 64
End: 2024-11-14
Payer: COMMERCIAL

## 2024-11-14 VITALS
HEART RATE: 73 BPM | HEIGHT: 70 IN | DIASTOLIC BLOOD PRESSURE: 77 MMHG | SYSTOLIC BLOOD PRESSURE: 134 MMHG | WEIGHT: 218.06 LBS | BODY MASS INDEX: 31.22 KG/M2

## 2024-11-14 DIAGNOSIS — H61.23 BILATERAL IMPACTED CERUMEN: Primary | ICD-10-CM

## 2024-11-14 DIAGNOSIS — H60.93 OTITIS EXTERNA OF BOTH EARS, UNSPECIFIED CHRONICITY, UNSPECIFIED TYPE: ICD-10-CM

## 2024-11-14 PROCEDURE — 3075F SYST BP GE 130 - 139MM HG: CPT | Mod: CPTII,S$GLB,, | Performed by: STUDENT IN AN ORGANIZED HEALTH CARE EDUCATION/TRAINING PROGRAM

## 2024-11-14 PROCEDURE — 1159F MED LIST DOCD IN RCRD: CPT | Mod: CPTII,S$GLB,, | Performed by: STUDENT IN AN ORGANIZED HEALTH CARE EDUCATION/TRAINING PROGRAM

## 2024-11-14 PROCEDURE — 3051F HG A1C>EQUAL 7.0%<8.0%: CPT | Mod: CPTII,S$GLB,, | Performed by: STUDENT IN AN ORGANIZED HEALTH CARE EDUCATION/TRAINING PROGRAM

## 2024-11-14 PROCEDURE — 4010F ACE/ARB THERAPY RXD/TAKEN: CPT | Mod: CPTII,S$GLB,, | Performed by: STUDENT IN AN ORGANIZED HEALTH CARE EDUCATION/TRAINING PROGRAM

## 2024-11-14 PROCEDURE — 99203 OFFICE O/P NEW LOW 30 MIN: CPT | Mod: S$GLB,,, | Performed by: STUDENT IN AN ORGANIZED HEALTH CARE EDUCATION/TRAINING PROGRAM

## 2024-11-14 PROCEDURE — 3008F BODY MASS INDEX DOCD: CPT | Mod: CPTII,S$GLB,, | Performed by: STUDENT IN AN ORGANIZED HEALTH CARE EDUCATION/TRAINING PROGRAM

## 2024-11-14 PROCEDURE — 3078F DIAST BP <80 MM HG: CPT | Mod: CPTII,S$GLB,, | Performed by: STUDENT IN AN ORGANIZED HEALTH CARE EDUCATION/TRAINING PROGRAM

## 2024-11-14 RX ORDER — CIPROFLOXACIN AND DEXAMETHASONE 3; 1 MG/ML; MG/ML
4 SUSPENSION/ DROPS AURICULAR (OTIC) 2 TIMES DAILY
Qty: 7.5 ML | Refills: 0 | Status: SHIPPED | OUTPATIENT
Start: 2024-11-14

## 2024-11-14 NOTE — PROGRESS NOTES
Ear, Nose, & Throat  Otolaryngology - Head & Neck Surgery        Subjective:     Chief Complaint:   Chief Complaint   Patient presents with    Tinnitus       Michael Slaughter Jr. is a 64 y.o. male who was self-referred for aural fullness.    Patient has a history of AS> AD aural fullness.  He reports this has been going on for around a month.  He has a history of bilateral impacted cerumen for which he attributes this to.  Denies any purulent otorrhea, hearing loss, vertigo, or change in tinnitus.  He does have excessive dry skin around his ear canal for which he relieves by scratching with a Q-tip.    Denies any history of diabetes.  He denies environmental exposure risk for otitis externa.    Past Medical History  Active Ambulatory Problems     Diagnosis Date Noted    Essential hypertension     Type 2 diabetes mellitus without complication, without long-term current use of insulin     GERD (gastroesophageal reflux disease)     ED (erectile dysfunction) 02/21/2013    Colon cancer screening 07/18/2014    Paroxysmal atrial fibrillation 11/30/2020    Chronic combined systolic and diastolic heart failure 11/30/2020    Anticoagulated 12/21/2020    Frequent PVCs 02/25/2021    Nonsustained ventricular tachycardia 02/25/2021    Overweight (BMI 25.0-29.9) 03/23/2022    Benign prostatic hyperplasia with nocturia 12/13/2023     Resolved Ambulatory Problems     Diagnosis Date Noted    Sinusitis     Class 1 obesity due to excess calories with serious comorbidity and body mass index (BMI) of 32.0 to 32.9 in adult 05/18/2020     Past Medical History:   Diagnosis Date    Diabetes mellitus type II        Past Surgical History  He has a past surgical history that includes Hand surgery; Colonoscopy; and Colonoscopy (N/A, 10/14/2024).    Past Surgical History:   Procedure Laterality Date    COLONOSCOPY      COLONOSCOPY N/A 10/14/2024    Procedure: COLONOSCOPY;  Surgeon: Trent Sanderson MD;  Location: [facility] ([address] [place] [address]);   "Service: Endoscopy;  Laterality: N/A;  Ref by NP DIMPLE Casper, Eliquis, Tirzepatide, PEG, portal - PC  ok to hold Eliquis 2 days per Dr Palafox-see E consult dated 8/16-GT  10/7 precall complete-confirmed date to stop tirzepatide and Eliquis-RB    HAND SURGERY      30 years ago        Family History  His family history includes Birth defects in his maternal grandmother; Cancer in his mother.    Social History  He reports that he has quit smoking. His smoking use included cigarettes. He has never used smokeless tobacco. He reports current alcohol use. He reports that he does not use drugs.    Allergies  He is allergic to lisinopril and ozempic [semaglutide].    Medications  He has a current medication list which includes the following prescription(s): apixaban, atorvastatin, freestyle mary ann 3 sensor, empagliflozin, metoprolol succinate, pantoprazole, entresto, spironolactone, tamsulosin, mounjaro, and ciprofloxacin-dexamethasone 0.3-0.1%.    ROS:  Pertinent positive and negative review of systems as noted in HPI.     Objective:     /77 (BP Location: Left arm, Patient Position: Sitting)   Pulse 73   Ht 5' 10" (1.778 m)   Wt 98.9 kg (218 lb 0.6 oz)   BMI 31.28 kg/m²    Physical Exam    Constitutional: Well appearing, communicating. No acute distress  Voice: Euphonic  Eyes: Conjunctiva WNL, Pupils reactive  Head/Face: House Brackmann I Bilaterally.  Right Ear:    See microscopy  Left Ear:    See microscopy  Vestibular:    Spontaneous Nystagmus: none  Neuro/Psychiatric:     Affect: Appropriate   Eyes: EOMI intact  Respiratory: No increased WOB, no stridor     Data Review:   LABS  Hemoglobin (g/dL)   Date Value   05/16/2024 16.4     WBC (K/uL)   Date Value   05/16/2024 7.15     Platelets (K/uL)   Date Value   05/16/2024 309     Creatinine (mg/dL)   Date Value   10/11/2024 1.2     Calcium (mg/dL)   Date Value   10/11/2024 9.9    (HGB:1,HCT:1,WBC:1,PLT:1,NA:1,K:1,CREATININE:1,BUN:1,GLU:1,POCTGLUCOSE:1,CALCIUM:1, " A1C:1)@    I have independently reviewed the lab results shown above. Findings significant for the conditions being treated include:  Normal    IMAGING    No pertinent imaging available    AUDIO    not performed    Procedures:   Procedure: Cerumen removal 38501     Pre procedure Diagnosis: bilateral Cerumen Impaction     Post procedure Diagnosis: same     Instrument: Binocular Microscope     Anesthesia: None     Procedure: After verbal consent was obtained, the patient was laid supine in the small procedure room. A microscope was used to examine the ear. Instrumentation and suction were used to remove any cerumen from the EAC. If indicated, the procedure was repeated on the contralateral side. The patient tolerated the procedure well and without any acute complication. Findings below.      Findings:               Right Ear:               EAC: Normal, Cerumen filled, slightly erythematous              Tympanic membrane: Intact              Middle Ear: No effusion present and Ossicles in normal position  Left Ear:               EAC: Normal Cerumen filled, erythematous, edematous, excessive dry flaky skin              Tympanic membrane: Intact              Middle Ear: No effusion present and Ossicles in normal position     The cerumen was removed completely from both ears.     Assessment:     1. Bilateral impacted cerumen    2. Otitis externa of both ears, unspecified chronicity, unspecified type        Plan:     I had a long discussion with the patient regarding his condition and the further workup and management options.  Instructed to use Ciprodex drops.  He will return to clinic in 2 weeks for a repeat assessment.  We will consider an audiogram at that time.    Voice recognition software was used in the creation of this note/communication and any sound-alike errors which may have occurred from its use should be taken in context when interpreting. If such errors prevent a clear understanding of the  note/communication, please contact the office for clarification.     No orders of the defined types were placed in this encounter.     Medications Ordered This Encounter   Medications    ciprofloxacin-dexAMETHasone 0.3-0.1% (CIPRODEX) 0.3-0.1 % DrpS

## 2024-11-17 ENCOUNTER — HOSPITAL ENCOUNTER (EMERGENCY)
Facility: HOSPITAL | Age: 64
Discharge: HOME | End: 2024-11-17
Attending: EMERGENCY MEDICINE
Payer: COMMERCIAL

## 2024-11-17 VITALS
HEIGHT: 71 IN | SYSTOLIC BLOOD PRESSURE: 128 MMHG | WEIGHT: 210 LBS | RESPIRATION RATE: 20 BRPM | DIASTOLIC BLOOD PRESSURE: 78 MMHG | HEART RATE: 78 BPM | BODY MASS INDEX: 29.4 KG/M2 | OXYGEN SATURATION: 100 % | TEMPERATURE: 99 F

## 2024-11-17 DIAGNOSIS — S61.011A LACERATION OF RIGHT THUMB WITHOUT DAMAGE TO NAIL, FOREIGN BODY PRESENCE UNSPECIFIED, INITIAL ENCOUNTER: Primary | ICD-10-CM

## 2024-11-17 PROCEDURE — 25000003 PHARM REV CODE 250

## 2024-11-17 PROCEDURE — 12002 RPR S/N/AX/GEN/TRNK2.6-7.5CM: CPT

## 2024-11-17 PROCEDURE — 99283 EMERGENCY DEPT VISIT LOW MDM: CPT | Mod: 25

## 2024-11-17 RX ORDER — CEPHALEXIN 500 MG/1
1000 CAPSULE ORAL EVERY 12 HOURS
Qty: 20 CAPSULE | Refills: 0 | Status: SHIPPED | OUTPATIENT
Start: 2024-11-17 | End: 2024-11-22

## 2024-11-17 RX ORDER — CEPHALEXIN 500 MG/1
1000 CAPSULE ORAL EVERY 12 HOURS
Qty: 20 CAPSULE | Refills: 0 | Status: SHIPPED | OUTPATIENT
Start: 2024-11-17 | End: 2024-11-17

## 2024-11-17 RX ORDER — LIDOCAINE HYDROCHLORIDE 10 MG/ML
10 INJECTION, SOLUTION EPIDURAL; INFILTRATION; INTRACAUDAL; PERINEURAL
Status: DISCONTINUED | OUTPATIENT
Start: 2024-11-17 | End: 2024-11-17 | Stop reason: HOSPADM

## 2024-11-17 RX ORDER — BACITRACIN 500 [USP'U]/G
OINTMENT TOPICAL
Status: DISCONTINUED | OUTPATIENT
Start: 2024-11-17 | End: 2024-11-17 | Stop reason: HOSPADM

## 2024-11-17 RX ORDER — ACETAMINOPHEN 325 MG/1
650 TABLET ORAL
Status: COMPLETED | OUTPATIENT
Start: 2024-11-17 | End: 2024-11-17

## 2024-11-17 RX ADMIN — ACETAMINOPHEN 650 MG: 325 TABLET ORAL at 10:11

## 2024-11-17 NOTE — ED PROVIDER NOTES
Encounter Date: 11/17/2024       History     Chief Complaint   Patient presents with    Laceration     RT THUMB     Patient is a 64 y.o. male with past medical history of hypertension, diabetes, on long-term anticoagulant use who presents to ED via self for concern for thumb laceration which began 1 hour prior to arrival.  Patient reports he was putting a toilet in the dumpster when the toilet broke and it cut his right thumb. patient is up-to-date on his tetanus immunization.  Patient denies other injuries.  Patient is awake and alert in no acute distress.      Review of patient's allergies indicates:   Allergen Reactions    Lisinopril      Other reaction(s): cough    Ozempic [semaglutide] Other (See Comments)     Intolerant/GI     Past Medical History:   Diagnosis Date    Diabetes mellitus type II     GERD (gastroesophageal reflux disease)     Sinusitis      Past Surgical History:   Procedure Laterality Date    COLONOSCOPY      COLONOSCOPY N/A 10/14/2024    Procedure: COLONOSCOPY;  Surgeon: Trent Sanderson MD;  Location: [facility] ([address] [place] [address]);  Service: Endoscopy;  Laterality: N/A;  Ref by NP DIMPLE Casper, Jayson, Tirzepatide, PEG, portal - PC  ok to hold Eliquis 2 days per Dr Palafox-see E consult dated 8/16-GT  10/7 precall complete-confirmed date to stop tirzepatide and Eliquis-RB    HAND SURGERY      30 years ago     Family History   Problem Relation Name Age of Onset    Cancer Mother          breast    Birth defects Maternal Grandmother          brain tumor    Heart disease Neg Hx      Stroke Neg Hx      Amblyopia Neg Hx      Blindness Neg Hx      Cataracts Neg Hx      Glaucoma Neg Hx      Hypertension Neg Hx      Macular degeneration Neg Hx      Retinal detachment Neg Hx      Strabismus Neg Hx      Colon cancer Neg Hx      Esophageal cancer Neg Hx       Social History     Tobacco Use    Smoking status: Former     Types: Cigarettes    Smokeless tobacco: Never   Substance Use Topics    Alcohol use: Yes      Comment: rarely    Drug use: No     Review of Systems   Constitutional: Negative.  Negative for fever.   HENT: Negative.     Respiratory: Negative.  Negative for shortness of breath.    Cardiovascular: Negative.  Negative for chest pain.   Gastrointestinal: Negative.  Negative for nausea and vomiting.   Genitourinary: Negative.    Musculoskeletal:  Negative for back pain, neck pain and neck stiffness.   Skin:  Positive for wound. Negative for color change, pallor and rash.   Neurological: Negative.  Negative for weakness.   Hematological:  Does not bruise/bleed easily.   Psychiatric/Behavioral: Negative.         Physical Exam     Initial Vitals [11/17/24 1007]   BP Pulse Resp Temp SpO2   121/63 80 18 98.9 °F (37.2 °C) 97 %      MAP       --         Physical Exam    Nursing note and vitals reviewed.  Constitutional: He appears well-developed and well-nourished. He is not diaphoretic. No distress.   HENT:   Head: Normocephalic and atraumatic.   Right Ear: External ear normal.   Left Ear: External ear normal.   Eyes: EOM are normal.   Neck:   Normal range of motion.  Cardiovascular:  Normal rate, regular rhythm, normal heart sounds and intact distal pulses.     Exam reveals no gallop and no friction rub.       No murmur heard.  Pulmonary/Chest: Breath sounds normal. No respiratory distress. He has no wheezes. He has no rhonchi. He has no rales. He exhibits no tenderness.   Musculoskeletal:         General: Normal range of motion.        Hands:       Cervical back: Normal range of motion.     Neurological: He is alert and oriented to person, place, and time. He has normal strength. GCS score is 15. GCS eye subscore is 4. GCS verbal subscore is 5. GCS motor subscore is 6.   Skin: Skin is warm and dry. Capillary refill takes less than 2 seconds.   Psychiatric: He has a normal mood and affect. His behavior is normal. Judgment and thought content normal.         ED Course   Lac Repair    Date/Time: 11/17/2024 6:09  PM    Performed by: Maral Hobbs NP  Authorized by: Carson Bernal MD    Consent:     Consent obtained:  Verbal    Consent given by:  Patient    Risks, benefits, and alternatives were discussed: yes      Risks discussed:  Infection, pain, poor cosmetic result, retained foreign body and poor wound healing    Alternatives discussed:  No treatment  Universal protocol:     Procedure explained and questions answered to patient or proxy's satisfaction: yes      Patient identity confirmed:  Verbally with patient  Anesthesia:     Anesthesia method:  Local infiltration    Local anesthetic:  Lidocaine 1% w/o epi  Laceration details:     Location:  Finger    Finger location:  R thumb    Length (cm):  3  Pre-procedure details:     Preparation:  Patient was prepped and draped in usual sterile fashion and imaging obtained to evaluate for foreign bodies  Exploration:     Hemostasis achieved with:  Direct pressure    Imaging obtained: x-ray      Foreign body noted: Possible FB noted vs calcification.      Wound exploration: wound explored through full range of motion      Wound extent: no foreign bodies/material noted, no tendon damage noted and no underlying fracture noted    Treatment:     Area cleansed with:  Povidone-iodine and saline    Amount of cleaning:  Extensive    Irrigation method:  Syringe and tap  Skin repair:     Repair method:  Sutures    Suture size:  4-0    Suture material:  Nylon    Suture technique:  Simple interrupted    Number of sutures:  3  Approximation:     Approximation:  Close  Repair type:     Repair type:  Simple  Post-procedure details:     Dressing:  Antibiotic ointment and splint for protection    Procedure completion:  Tolerated well, no immediate complications    Labs Reviewed - No data to display       Imaging Results              X-Ray Finger 2 or More Views Right (Final result)  Result time 11/17/24 11:30:00      Final result by Trent hKan MD (11/17/24 11:30:00)                    Impression:      No acute osseous abnormality.    2 mm hyperdense structure within the soft tissues dorsal to the thumb IP joint, as described above.      Electronically signed by: Trent Khan  Date:    11/17/2024  Time:    11:30               Narrative:    EXAMINATION:  XR FINGER 2 OR MORE VIEWS RIGHT    CLINICAL HISTORY:  thumb laceration;    COMPARISON:  None    FINDINGS:  No acute fracture or malalignment of the right thumb.  Mild thumb MCP osteoarthrosis.  Only seen on the lateral projection, there is a linear 2 mm hyperdensity located dorsal to the thumb proximal phalangeal head/IP joint, this is potentially a calcification, but small foreign body cannot be excluded in the setting of laceration.                                       Medications   acetaminophen tablet 650 mg (650 mg Oral Given 11/17/24 1053)     Medical Decision Making  MDM    Patient presents for emergent evaluation of acute thumb laceration that poses a possible threat to life and/or bodily function.    Differential diagnosis included but not limited to fracture, dislocation, sprain, foreign body retention, tendon laceration.  In the ED patient found to have acute laceration to dorsal aspect of the right thumb at the PIP joint.  Patient was has a normal range of motion of the thumb without difficulty.  Low suspicion for tendon injury.  Patient has normal cap refill and sensation distally.  Patient has a strong right radial pulse..      X-ray finger significant for no acute osseous abnormality.  2 mm hyperdense structure within the soft tissues dorsal to the thumb IP joint consistent with possible calcification versus foreign body.   I discussed these findings with the patient.  Wound was copiously irrigated and the full extent unknown with evaluated.  No foreign body visualized on exam.  No signs of tendon laceration or injury.    Discharge MDM  I discussed the patient presentation labs, X-rays, findings with ED attending Dr. Bernal.     Patient was managed in the ED with laceration repair.    The response to treatment was good.    Patient was discharged in stable condition with close follow up.  Detailed return precautions discussed to return to the ED for any new or worsening concerns.  Patient verbalizes understanding.    NP uses Epic and voice recognition software prone to occasional and minor errors that may persist in the medical record.      Amount and/or Complexity of Data Reviewed  Radiology: ordered and independent interpretation performed.    Risk  OTC drugs.  Prescription drug management.                                      Clinical Impression:  Final diagnoses:  [S61.011A] Laceration of right thumb without damage to nail, foreign body presence unspecified, initial encounter (Primary)          ED Disposition Condition    Discharge Stable          ED Prescriptions       Medication Sig Dispense Start Date End Date Auth. Provider    cephALEXin (KEFLEX) 500 MG capsule  (Status: Discontinued) Take 2 capsules (1,000 mg total) by mouth every 12 (twelve) hours. for 5 days 20 capsule 11/17/2024 11/17/2024 Maral Hobbs NP    cephALEXin (KEFLEX) 500 MG capsule Take 2 capsules (1,000 mg total) by mouth every 12 (twelve) hours. for 5 days 20 capsule 11/17/2024 11/22/2024 Maral Hobbs NP          Follow-up Information       Follow up With Specialties Details Why Contact Info Additional Information    Daniel Mrianda MD Internal Medicine Schedule an appointment as soon as possible for a visit  For recheck/continuing care [address]  [place] [address]  [phone]       DIVINA Enamorado MD Hand Surgery, General Surgery Schedule an appointment as soon as possible for a visit  For recheck/continuing care [address] [facility] [address]  [phone]       [facility] - Emergency Dept Emergency Medicine  If symptoms worsen [address] [facility] [id number] [phone]  [address] [place] [address]             Power, Maral KRISHNAMURTHY NP  11/17/24 [id number]

## 2024-11-17 NOTE — Clinical Note
"Michael Slaughter (Joe) was seen and treated in our emergency department on 11/17/2024.  He may return to work on 11/19/2024.       If you have any questions or concerns, please don't hesitate to call.      Maral Hobbs NP"

## 2024-11-17 NOTE — DISCHARGE INSTRUCTIONS
Keep the wound clean and dry for 24 hours.  You thing cleaned the wound with warm water and antibacterial soap.  Do not submerge the wound under water until the stitches are removed.  Rare the thumb splint for the next few days to help decrease movement.  The stitches will need to be removed in 10-14 days.    Please return to the ED for redness swelling and warmth of the thumb, pus drainage from the wound, fever, worsening pain, numbness or tingling in his thumb, or any new or worsening concerns.

## 2024-11-20 ENCOUNTER — PATIENT MESSAGE (OUTPATIENT)
Dept: OTOLARYNGOLOGY | Facility: CLINIC | Age: 64
End: 2024-11-20
Payer: COMMERCIAL

## 2024-11-20 ENCOUNTER — TELEPHONE (OUTPATIENT)
Dept: OTOLARYNGOLOGY | Facility: CLINIC | Age: 64
End: 2024-11-20
Payer: COMMERCIAL

## 2025-01-07 ENCOUNTER — TELEPHONE (OUTPATIENT)
Dept: ELECTROPHYSIOLOGY | Facility: CLINIC | Age: 65
End: 2025-01-07
Payer: COMMERCIAL

## 2025-01-07 DIAGNOSIS — I48.0 PAROXYSMAL ATRIAL FIBRILLATION: Primary | ICD-10-CM

## 2025-01-14 DIAGNOSIS — I10 ESSENTIAL HYPERTENSION: ICD-10-CM

## 2025-01-14 DIAGNOSIS — E11.9 TYPE 2 DIABETES MELLITUS WITHOUT COMPLICATION, WITHOUT LONG-TERM CURRENT USE OF INSULIN: ICD-10-CM

## 2025-01-14 RX ORDER — SPIRONOLACTONE 25 MG/1
25 TABLET ORAL DAILY
Qty: 90 TABLET | Refills: 3 | Status: SHIPPED | OUTPATIENT
Start: 2025-01-14

## 2025-01-14 RX ORDER — BLOOD-GLUCOSE SENSOR
1 EACH MISCELLANEOUS
Qty: 2 EACH | Refills: 11 | Status: SHIPPED | OUTPATIENT
Start: 2025-01-14

## 2025-01-14 NOTE — TELEPHONE ENCOUNTER
No care due was identified.  [facility] Embedded Care Due Messages. Reference number: [id number].   1/14/2025 5:08:37 AM CST

## 2025-02-04 NOTE — PROGRESS NOTES
Mr. Slaughter is a patient of Dr. Gonzalez and was last seen in clinic 6/29/2023.      Subjective:   Patient ID:  Michael Slaughter Jr. is a 64 y.o. male who presents for follow up of Atrial Fibrillation  .     HPI:    Mr. Slaughter is a 64 y.o. male with HTN, DM, cardiomyopathy (25%->40%), PVC, VT, AF/AFL here for follow up.     Background:    Referring Cardiologist: Severo Palafox III, MD  Primary Care Physician: Michael Deal MD    Mr. Sukhjinder Michaels has a hx of hypertension and diabetes who was in his usually state of health until he developed heart failure symptoms for the past 1-2 months (dyspnea on exertion, orthopnea). An echocardiogram performed 11/30/2020 noted a systolic function of 25% with severe LA enlargement, moderate MR, and AF. His ECG from the same day noted sinus rhythm with a PVC (RBBB concordant at least through V3, superior axis as assessed in lead II). A stress echocardiogram in 2013 noted a LVEF of 45% without ischemic changes. He was initiated on eliquis, metoprolol and valsartan.    Available ECGs in Epic which show sinus rhythm.    At our first clinic visit 12/2020 we elected to monitor and see how much AF he was having prior to starting anti-arrhythmic drug therapy. We also discussed monitoring his EF with goal-directed medical therapy. If his EF improved would discuss ILR implant.    Mr. Slaughter wore a 14 day Bardy patch which noted 34 episodes of AF with a burden of 3% with an average ventricular rate of 107 bpm and longest episode lasting 2.6 hours. There were also 11 episodes of atrial flutter with 2:1 AV conduction lasting up to 22 minutes. There was an 11% PAC and 8% polyfocal PVC burden along with 5 episodes of nonsustained VT lasting up to 27 beats. We discussed on the phone and initiated amiodarone with plan for ECHO after he was on GDMT for 90 days and proceed with ICD implant if EF had not recovered. He is on metoprolol and entresto. A nuclear stress test from 1/2021 showed no ischemia/infarct.  He followed up with me 2/2021 and he felt much better. Plan was if EF recovered we would stop amiodarone and then evaluate for recurrent arrhythmias. If EF remained 35% or less then would recommend ICD implantation.    Mr. Slaughter returned for follow-up 5/2021. He saw Dr. Palafox 4/23/2021 and his metoprolol was increased. An ECHO from 3/2021 noted some improvement in LV function (up to 35%). He continued to feel well. He exercised regularly.  We discussed that hopefully his EF would normalize with medical therapy. If so would stop amiodarone and then evaluate for recurrent arrhythmias. If EF remained 35% or less then would recommend ICD implantation (if remained NYHA class II).     Mr. Slaughter returned for follow-up 11/2021. He feels great. Reports no heart failure symptoms. Does physical work, mows his lawn, and walks 5-10k steps daily without difficulty. He walks up the stairs (3 flights) at work at the [employer] without shortness of breath.    Mr. Slaughter returned for follow-up 2/2022. He felt great! No fatigue or dyspnea on exertion. No palpitations. We stopped amiodarone with plan to try sotalol for symptomatic arrhythmia recurrence (would require inpatient stay).    6/2022: Mr Slaughter returned for follow-up. ECHO 4/2022 noted a LVEF of 40%. He continued to work, mows his lawn, and was very physically active. Feels well. He has no symptomatic arrhythmia recurrences. Discussed that his LVEF recovered on metoprolol and entresto (40% 11/2021 and 4/2022 echo) and he felt great. Will repeat 14 day holter. If he has recurrence of symptomatic arrhythmias would try sotalol (would need an inpatient stay for initiation).     7/2022: Extended holter noted sinus rhythm with no AF. 17 beat run of nonsustained VT observed. Plan was not to initiate sotalol.    12/2022: Mr. Slaughter returned for follow-up. Reports some occasional gas-like chest pain episodes. Also reports occasional tripping while walking at work at the  [employer]. Feels great otherwise. No heart failure symptoms. Mows his own lawn. No palpitations.    6/29/2023: Mr. Sukhjinder Michaels returns for follow-up. Only complaint is low back pain occurred by building a shed. Otherwise feels well.  In clinic electrocardiogram is sinus rhythm with narrow QRS and no ectopy.  In summary, Mr. Slaughter is a pleasant 62 year-old man with hypertension and previous nonischemic mild systolic cardiomyopathy with LVEF of 45% in 2013 now with symptomatic severe systolic heart failure with LVEF of 25% with low burden pAF but high burden PACs, 8% polyfocal PVCs and nonsustained VT. He was treated with amiodarone. This was stopped in February of 2022. His LVEF recovered on metoprolol and entresto (40% 11/2021 and 4/2022 echo). He feels great. If he has recurrence of symptomatic arrhythmias would try sotalol (would need an inpatient stay for initiation).     Update (02/05/2025):    Today he says he is feeling well. No CHAVEZ, CP, palpitations. Sleeping well. Cuts grass. Walks frequently at his job at the [employer]. No LH, presyncope, or syncope.     He is currently taking eliquis 5mg BID for stroke prophylaxis and denies significant bleeding episodes. He is currently being treated with toprol 50mg daily for HR control.  Kidney function is stable, with a creatinine of 1.2 on 10/11/2024.    I have personally reviewed the patient's EKG today, which shows sinus rhythm with one PVC at 72bpm. CA interval is 208. QRS is 106. QTc is 424. No PVCs on rhythm strip.    Relevant Cardiac Test Results:    2D Echo (4/20/2022):  The left ventricle is normal in size with moderately decreased systolic function.  The estimated ejection fraction is 40%.  Left ventricular diastolic dysfunction.  Normal right ventricular size with low normal right ventricular systolic function.  Normal central venous pressure (3 mmHg).  The estimated PA systolic pressure is 17 mmHg.  Trivial lateral pericardial effusion. And  "under the RA.    Current Outpatient Medications   Medication Sig    apixaban (ELIQUIS) 5 mg Tab TAKE 1 TABLET BY MOUTH TWICE A DAY    atorvastatin (LIPITOR) 10 MG tablet Take 1 tablet (10 mg total) by mouth once daily.    blood-glucose sensor (FREESTYLE STEVAN 3 PLUS SENSOR) Tatiana Place 1 each onto the skin every 14 (fourteen) days.    ciprofloxacin-dexAMETHasone 0.3-0.1% (CIPRODEX) 0.3-0.1 % DrpS Place 4 drops into both ears 2 (two) times daily.    empagliflozin (JARDIANCE) 10 mg tablet Take 1 tablet (10 mg total) by mouth once daily.    metoprolol succinate (TOPROL-XL) 50 MG 24 hr tablet Take 1 tablet (50 mg total) by mouth once daily.    pantoprazole (PROTONIX) 40 MG tablet Take 1 tablet (40 mg total) by mouth once daily.    sacubitriL-valsartan (ENTRESTO)  mg per tablet Take 1 tablet by mouth 2 (two) times daily.    spironolactone (ALDACTONE) 25 MG tablet Take 1 tablet (25 mg total) by mouth once daily.    tamsulosin (FLOMAX) 0.4 mg Cap Take 1 capsule (0.4 mg total) by mouth once daily.    tirzepatide (MOUNJARO) 2.5 mg/0.5 mL PnIj Inject 2.5 mg into the skin every 7 days.     No current facility-administered medications for this visit.       Review of Systems   Constitutional: Negative for malaise/fatigue.   Cardiovascular:  Negative for chest pain, dyspnea on exertion, irregular heartbeat, leg swelling and palpitations.   Respiratory:  Negative for shortness of breath.    Hematologic/Lymphatic: Negative for bleeding problem.   Skin:  Negative for rash.   Musculoskeletal:  Negative for myalgias.   Gastrointestinal:  Negative for hematemesis, hematochezia and nausea.   Genitourinary:  Negative for hematuria.   Neurological:  Negative for light-headedness.   Psychiatric/Behavioral:  Negative for altered mental status.    Allergic/Immunologic: Negative for persistent infections.       Objective:          /72 (Patient Position: Sitting)   Pulse 72   Ht 5' 11" (1.803 m)   Wt 97.6 kg (215 lb 2.7 oz)   " BMI 30.01 kg/m²     Physical Exam  Vitals and nursing note reviewed.   Constitutional:       Appearance: Normal appearance. He is well-developed.   HENT:      Head: Normocephalic.      Nose: Nose normal.   Eyes:      Pupils: Pupils are equal, round, and reactive to light.   Cardiovascular:      Rate and Rhythm: Normal rate and regular rhythm.   Pulmonary:      Effort: No respiratory distress.   Musculoskeletal:         General: Normal range of motion.   Skin:     General: Skin is warm and dry.      Findings: No erythema.   Neurological:      Mental Status: He is alert and oriented to person, place, and time.   Psychiatric:         Speech: Speech normal.         Behavior: Behavior normal.           Lab Results   Component Value Date     10/11/2024    K 4.4 10/11/2024    BUN 17 10/11/2024    CREATININE 1.2 10/11/2024    ALT 19 10/11/2024    AST 14 10/11/2024    HGB 16.4 05/16/2024    HCT 50.3 05/16/2024    TSH 1.317 05/16/2024    LDLCALC 71.0 05/16/2024             Assessment:     1. Paroxysmal atrial fibrillation    2. Chronic combined systolic and diastolic heart failure    3. Essential hypertension    4. Frequent PVCs    5. Nonsustained ventricular tachycardia    6. Anticoagulated        Plan:     In summary, Mr. Slaughter is a 64 y.o. male with HTN, DM, cardiomyopathy (25%->40%), PVC, VT, AF/AFL here for follow up.   Pt doing well from a symptom standpoint - denies palpitations, LH, syncope. No CHAVEZ or CP. One PVC on ECG and no PVCs on rhythm strip. Will update monitor to confirm absence of concerning arrhythmias. He remains on eliquis and metoprolol. Not on anti-arrhythmic medication currently. He is on GDMT - will get him reestablished with cardiology. Hx of CM, EF 25% improved to 40% on echo 2022. Update echo. No CHF symptoms. He is active and feels well. Continue current meds.    Monitor, echo, cardiology appt  If stable, RTC 6 mo, sooner if needed    *A copy of this note has been sent to   Carlos*    Follow up in about 6 months (around 8/5/2025).    ------------------------------------------------------------------    DIANN Vale, NP-C  Cardiac Electrophysiology

## 2025-02-05 ENCOUNTER — HOSPITAL ENCOUNTER (OUTPATIENT)
Dept: CARDIOLOGY | Facility: CLINIC | Age: 65
Discharge: HOME | End: 2025-02-05
Payer: COMMERCIAL

## 2025-02-05 ENCOUNTER — OFFICE VISIT (OUTPATIENT)
Dept: ELECTROPHYSIOLOGY | Facility: CLINIC | Age: 65
End: 2025-02-05
Payer: COMMERCIAL

## 2025-02-05 ENCOUNTER — HOSPITAL ENCOUNTER (OUTPATIENT)
Dept: CARDIOLOGY | Facility: HOSPITAL | Age: 65
Discharge: HOME | End: 2025-02-05
Attending: NURSE PRACTITIONER
Payer: COMMERCIAL

## 2025-02-05 VITALS
SYSTOLIC BLOOD PRESSURE: 128 MMHG | HEART RATE: 72 BPM | BODY MASS INDEX: 30.13 KG/M2 | HEIGHT: 71 IN | WEIGHT: 215.19 LBS | DIASTOLIC BLOOD PRESSURE: 72 MMHG

## 2025-02-05 VITALS
HEART RATE: 68 BPM | BODY MASS INDEX: 30.1 KG/M2 | HEIGHT: 71 IN | WEIGHT: 215 LBS | DIASTOLIC BLOOD PRESSURE: 78 MMHG | SYSTOLIC BLOOD PRESSURE: 130 MMHG

## 2025-02-05 DIAGNOSIS — I49.3 FREQUENT PVCS: ICD-10-CM

## 2025-02-05 DIAGNOSIS — I48.0 PAROXYSMAL ATRIAL FIBRILLATION: Primary | ICD-10-CM

## 2025-02-05 DIAGNOSIS — I47.29 NONSUSTAINED VENTRICULAR TACHYCARDIA: ICD-10-CM

## 2025-02-05 DIAGNOSIS — I48.0 PAROXYSMAL ATRIAL FIBRILLATION: ICD-10-CM

## 2025-02-05 DIAGNOSIS — I10 ESSENTIAL HYPERTENSION: ICD-10-CM

## 2025-02-05 DIAGNOSIS — I50.42 CHRONIC COMBINED SYSTOLIC AND DIASTOLIC HEART FAILURE: ICD-10-CM

## 2025-02-05 DIAGNOSIS — Z79.01 ANTICOAGULATED: ICD-10-CM

## 2025-02-05 LAB
ASCENDING AORTA: 3.07 CM
AV AREA BY CONTINUOUS VTI: 3.3 CM2
AV INDEX (PROSTH): 0.76
AV LVOT MEAN GRADIENT: 1 MMHG
AV LVOT PEAK GRADIENT: 1 MMHG
AV MEAN GRADIENT: 2 MMHG
AV PEAK GRADIENT: 3 MMHG
AV VALVE AREA BY VELOCITY RATIO: 2.8 CM²
AV VALVE AREA: 3.1 CM2
AV VELOCITY RATIO: 0.67
BSA FOR ECHO PROCEDURE: 2.21 M2
CV ECHO LV RWT: 0.25 CM
DOP CALC AO PEAK VEL: 0.9 M/S
DOP CALC AO VTI: 19.4 CM
DOP CALC LVOT AREA: 4.2 CM2
DOP CALC LVOT DIAMETER: 2.3 CM
DOP CALC LVOT PEAK VEL: 0.6 M/S
DOP CALC LVOT STROKE VOLUME: 61 CM3
DOP CALCLVOT PEAK VEL VTI: 14.7 CM
E WAVE DECELERATION TIME: 249 MS
E/A RATIO: 0.62
E/E' RATIO: 7 M/S
ECHO EF ESTIMATED: 58 %
ECHO LV POSTERIOR WALL: 0.7 CM (ref 0.6–1.1)
FRACTIONAL SHORTENING: 31.6 % (ref 28–44)
GLOBAL LONGITUIDAL STRAIN: 14 %
INTERVENTRICULAR SEPTUM: 0.7 CM (ref 0.6–1.1)
IVC DIAMETER: 1.11 CM
LA MAJOR: 5.6 CM
LA MINOR: 5.1 CM
LA WIDTH: 4.4 CM
LEFT ATRIUM SIZE: 3.8 CM
LEFT ATRIUM VOLUME INDEX MOD: 33 ML/M2
LEFT ATRIUM VOLUME INDEX: 35 ML/M2
LEFT ATRIUM VOLUME MOD: 71 ML
LEFT ATRIUM VOLUME: 76 CM3
LEFT INTERNAL DIMENSION IN SYSTOLE: 3.9 CM (ref 2.1–4)
LEFT VENTRICLE DIASTOLIC VOLUME INDEX: 72.33 ML/M2
LEFT VENTRICLE DIASTOLIC VOLUME: 156.95 ML
LEFT VENTRICLE MASS INDEX: 66.5 G/M2
LEFT VENTRICLE SYSTOLIC VOLUME INDEX: 30.2 ML/M2
LEFT VENTRICLE SYSTOLIC VOLUME: 65.46 ML
LEFT VENTRICULAR INTERNAL DIMENSION IN DIASTOLE: 5.7 CM (ref 3.5–6)
LEFT VENTRICULAR MASS: 144.3 G
LV LATERAL E/E' RATIO: 5.9
LV SEPTAL E/E' RATIO: 8.2
MV PEAK A VEL: 0.66 M/S
MV PEAK E VEL: 0.41 M/S
OHS CV RV/LV RATIO: 0.63 CM
OHS LV EJECTION FRACTION SIMPSONS BIPLANE MOD: 48 %
OHS QRS DURATION: 106 MS
OHS QTC CALCULATION: 424 MS
RA MAJOR: 4.52 CM
RA PRESSURE ESTIMATED: 3 MMHG
RA WIDTH: 4.13 CM
RIGHT ATRIAL AREA: 15.8 CM2
RIGHT VENTRICLE DIASTOLIC BASEL DIMENSION: 3.6 CM
RV TISSUE DOPPLER FREE WALL SYSTOLIC VELOCITY 1 (APICAL 4 CHAMBER VIEW): 10.06 CM/S
SINUS: 3.5 CM
STJ: 3.03 CM
TDI LATERAL: 0.07 M/S
TDI SEPTAL: 0.05 M/S
TDI: 0.06 M/S
TRICUSPID ANNULAR PLANE SYSTOLIC EXCURSION: 2.27 CM
Z-SCORE OF LEFT VENTRICULAR DIMENSION IN END DIASTOLE: -2.31
Z-SCORE OF LEFT VENTRICULAR DIMENSION IN END SYSTOLE: -0.9

## 2025-02-05 PROCEDURE — 93306 TTE W/DOPPLER COMPLETE: CPT | Mod: 26,,, | Performed by: INTERNAL MEDICINE

## 2025-02-05 PROCEDURE — 1159F MED LIST DOCD IN RCRD: CPT | Mod: CPTII,S$GLB,, | Performed by: NURSE PRACTITIONER

## 2025-02-05 PROCEDURE — 93356 MYOCRD STRAIN IMG SPCKL TRCK: CPT

## 2025-02-05 PROCEDURE — 93010 ELECTROCARDIOGRAM REPORT: CPT | Mod: S$GLB,,, | Performed by: INTERNAL MEDICINE

## 2025-02-05 PROCEDURE — 3074F SYST BP LT 130 MM HG: CPT | Mod: CPTII,S$GLB,, | Performed by: NURSE PRACTITIONER

## 2025-02-05 PROCEDURE — 1101F PT FALLS ASSESS-DOCD LE1/YR: CPT | Mod: CPTII,S$GLB,, | Performed by: NURSE PRACTITIONER

## 2025-02-05 PROCEDURE — 1126F AMNT PAIN NOTED NONE PRSNT: CPT | Mod: CPTII,S$GLB,, | Performed by: NURSE PRACTITIONER

## 2025-02-05 PROCEDURE — 3008F BODY MASS INDEX DOCD: CPT | Mod: CPTII,S$GLB,, | Performed by: NURSE PRACTITIONER

## 2025-02-05 PROCEDURE — 93356 MYOCRD STRAIN IMG SPCKL TRCK: CPT | Mod: ,,, | Performed by: INTERNAL MEDICINE

## 2025-02-05 PROCEDURE — 1160F RVW MEDS BY RX/DR IN RCRD: CPT | Mod: CPTII,S$GLB,, | Performed by: NURSE PRACTITIONER

## 2025-02-05 PROCEDURE — 3288F FALL RISK ASSESSMENT DOCD: CPT | Mod: CPTII,S$GLB,, | Performed by: NURSE PRACTITIONER

## 2025-02-05 PROCEDURE — 99999 PR PBB SHADOW E&M-EST. PATIENT-LVL IV: CPT | Mod: PBBFAC,,, | Performed by: NURSE PRACTITIONER

## 2025-02-05 PROCEDURE — 4010F ACE/ARB THERAPY RXD/TAKEN: CPT | Mod: CPTII,S$GLB,, | Performed by: NURSE PRACTITIONER

## 2025-02-05 PROCEDURE — 93005 ELECTROCARDIOGRAM TRACING: CPT | Mod: S$GLB,,, | Performed by: INTERNAL MEDICINE

## 2025-02-05 PROCEDURE — 3078F DIAST BP <80 MM HG: CPT | Mod: CPTII,S$GLB,, | Performed by: NURSE PRACTITIONER

## 2025-02-05 PROCEDURE — 99214 OFFICE O/P EST MOD 30 MIN: CPT | Mod: S$GLB,,, | Performed by: NURSE PRACTITIONER

## 2025-02-10 ENCOUNTER — CLINICAL SUPPORT (OUTPATIENT)
Dept: CARDIOLOGY | Facility: HOSPITAL | Age: 65
End: 2025-02-10
Attending: NURSE PRACTITIONER
Payer: COMMERCIAL

## 2025-02-10 ENCOUNTER — DOCUMENTATION ONLY (OUTPATIENT)
Dept: CARDIOLOGY | Facility: HOSPITAL | Age: 65
End: 2025-02-10
Payer: COMMERCIAL

## 2025-02-10 DIAGNOSIS — I49.3 FREQUENT PVCS: ICD-10-CM

## 2025-02-10 DIAGNOSIS — I47.29 NONSUSTAINED VENTRICULAR TACHYCARDIA: ICD-10-CM

## 2025-02-10 NOTE — PROGRESS NOTES
I reached out to [employer] to verify insurance for 's 14 day monitor. I spoke with  today and was told the estimated out of pocket cost of $259.36 would be the patient's responsibility for the service provided by [employer]. Due to unmet deductible. The call reference number is as stated:[id number]  Information submitted for mail out.

## 2025-02-11 ENCOUNTER — OFFICE VISIT (OUTPATIENT)
Dept: CARDIOLOGY | Facility: CLINIC | Age: 65
End: 2025-02-11
Payer: COMMERCIAL

## 2025-02-11 VITALS
HEART RATE: 96 BPM | OXYGEN SATURATION: 74 % | WEIGHT: 213.63 LBS | SYSTOLIC BLOOD PRESSURE: 118 MMHG | HEIGHT: 71 IN | BODY MASS INDEX: 29.91 KG/M2 | DIASTOLIC BLOOD PRESSURE: 66 MMHG

## 2025-02-11 DIAGNOSIS — E11.9 TYPE 2 DIABETES MELLITUS WITHOUT COMPLICATION, WITHOUT LONG-TERM CURRENT USE OF INSULIN: ICD-10-CM

## 2025-02-11 DIAGNOSIS — I48.0 PAROXYSMAL ATRIAL FIBRILLATION: ICD-10-CM

## 2025-02-11 DIAGNOSIS — I50.42 CHRONIC COMBINED SYSTOLIC AND DIASTOLIC HEART FAILURE: Primary | ICD-10-CM

## 2025-02-11 DIAGNOSIS — I10 ESSENTIAL HYPERTENSION: ICD-10-CM

## 2025-02-11 DIAGNOSIS — I49.3 FREQUENT PVCS: ICD-10-CM

## 2025-02-11 DIAGNOSIS — I47.29 NONSUSTAINED VENTRICULAR TACHYCARDIA: ICD-10-CM

## 2025-02-11 PROCEDURE — 1160F RVW MEDS BY RX/DR IN RCRD: CPT | Mod: CPTII,S$GLB,, | Performed by: PHYSICIAN ASSISTANT

## 2025-02-11 PROCEDURE — 99214 OFFICE O/P EST MOD 30 MIN: CPT | Mod: S$GLB,,, | Performed by: PHYSICIAN ASSISTANT

## 2025-02-11 PROCEDURE — 3074F SYST BP LT 130 MM HG: CPT | Mod: CPTII,S$GLB,, | Performed by: PHYSICIAN ASSISTANT

## 2025-02-11 PROCEDURE — 1126F AMNT PAIN NOTED NONE PRSNT: CPT | Mod: CPTII,S$GLB,, | Performed by: PHYSICIAN ASSISTANT

## 2025-02-11 PROCEDURE — 1159F MED LIST DOCD IN RCRD: CPT | Mod: CPTII,S$GLB,, | Performed by: PHYSICIAN ASSISTANT

## 2025-02-11 PROCEDURE — 99999 PR PBB SHADOW E&M-EST. PATIENT-LVL V: CPT | Mod: PBBFAC,,, | Performed by: PHYSICIAN ASSISTANT

## 2025-02-11 PROCEDURE — 3008F BODY MASS INDEX DOCD: CPT | Mod: CPTII,S$GLB,, | Performed by: PHYSICIAN ASSISTANT

## 2025-02-11 PROCEDURE — 4010F ACE/ARB THERAPY RXD/TAKEN: CPT | Mod: CPTII,S$GLB,, | Performed by: PHYSICIAN ASSISTANT

## 2025-02-11 PROCEDURE — 1101F PT FALLS ASSESS-DOCD LE1/YR: CPT | Mod: CPTII,S$GLB,, | Performed by: PHYSICIAN ASSISTANT

## 2025-02-11 PROCEDURE — 3078F DIAST BP <80 MM HG: CPT | Mod: CPTII,S$GLB,, | Performed by: PHYSICIAN ASSISTANT

## 2025-02-11 PROCEDURE — 3288F FALL RISK ASSESSMENT DOCD: CPT | Mod: CPTII,S$GLB,, | Performed by: PHYSICIAN ASSISTANT

## 2025-02-13 NOTE — TELEPHONE ENCOUNTER
----- Message from Med Assistant Muñoz sent at 2/12/2025  4:02 PM CST -----    ----- Message -----  From: Whitley Lopez  Sent: 2/12/2025   3:05 PM CST  To: Lisandra KRISHNAMURTHY Staff    Type:  RX Refill Request    Who Called: pt    RX Name and Strength: apixaban  apixaban (ELIQUIS) 5 mg Tab  metoprolol succinate  metoprolol succinate (TOPROL-XL) 50 MG 24 hr tablet  1 x a day      How is the patient currently taking it? (ex. 1XDay): 2 x a day    Is this a 30 day or 90 day RX: 30    Preferred Pharmacy with phone number: [facility]/pharmacy #[id number] - [place], [place] - [address]   Phone: [phone]  Fax: [phone]          Local or Mail Order: local    Ordering Provider: LISANDRA PARHAM     Would the patient rather a call back or a response via [facility]? call    Best Call Back Number: [phone]     Additional Information:

## 2025-02-14 RX ORDER — METOPROLOL SUCCINATE 50 MG/1
50 TABLET, EXTENDED RELEASE ORAL DAILY
Qty: 30 TABLET | Refills: 11 | Status: SHIPPED | OUTPATIENT
Start: 2025-02-14

## 2025-02-21 ENCOUNTER — PATIENT MESSAGE (OUTPATIENT)
Dept: INTERNAL MEDICINE | Facility: CLINIC | Age: 65
End: 2025-02-21
Payer: COMMERCIAL

## 2025-02-24 DIAGNOSIS — E11.9 TYPE 2 DIABETES MELLITUS WITHOUT COMPLICATION, WITHOUT LONG-TERM CURRENT USE OF INSULIN: ICD-10-CM

## 2025-04-07 ENCOUNTER — TELEPHONE (OUTPATIENT)
Dept: INTERNAL MEDICINE | Facility: CLINIC | Age: 65
End: 2025-04-07
Payer: COMMERCIAL

## 2025-04-07 NOTE — TELEPHONE ENCOUNTER
----- Message from DIANN Landa FNP sent at 4/7/2025 11:31 AM CDT -----  Regarding: bernadette Sanchez

## 2025-04-07 NOTE — TELEPHONE ENCOUNTER
I spoke with the patient and he stated he's on the road right now and would have to call back to reschedule after he looks on his calendar.

## 2025-04-09 ENCOUNTER — PATIENT MESSAGE (OUTPATIENT)
Dept: INTERNAL MEDICINE | Facility: CLINIC | Age: 65
End: 2025-04-09
Payer: COMMERCIAL

## 2025-04-11 ENCOUNTER — TELEPHONE (OUTPATIENT)
Dept: ORTHOPEDICS | Facility: CLINIC | Age: 65
End: 2025-04-11
Payer: COMMERCIAL

## 2025-04-11 DIAGNOSIS — M50.30 DDD (DEGENERATIVE DISC DISEASE), CERVICAL: Primary | ICD-10-CM

## 2025-04-11 NOTE — TELEPHONE ENCOUNTER
Spoke to patient regarding x-ray. Patient aware of x-ray scheduled for 7:  30 am at the [facility]. Patient stated thank you. Thanks

## 2025-04-17 NOTE — PROGRESS NOTES
DATE: 4/17/2025  PATIENT: Michael Slaughter Jr.    Supervising Physician: Trent Talley M.D.    CHIEF COMPLAINT: left arm pain    HISTORY:  Michael Slaughter Jr. is a 64 y.o. male with a pmhx of DM II, CHF, a fib (on eliquis)  here for initial evaluation of neck and left arm pain (Neck - 7, Arm - 7). The pain has been present for a month without injury. The patient describes the pain as radiating. The pain is worse with nothing and improved by nothing and is constant. There is positive associated numbness and tingling. There is positive subjective weakness. Prior treatments have included tylenol and chiropractic rx, but no ESIs or surgery.     The patient denies myelopathic symptoms such as handwriting changes or difficulty with buttons/coins/keys. Denies perineal paresthesias, bowel/bladder dysfunction.    PAST MEDICAL/SURGICAL HISTORY:  Past Medical History:   Diagnosis Date    Diabetes mellitus type II     GERD (gastroesophageal reflux disease)     Sinusitis      Past Surgical History:   Procedure Laterality Date    COLONOSCOPY      COLONOSCOPY N/A 10/14/2024    Procedure: COLONOSCOPY;  Surgeon: Trent Sanderson MD;  Location: [facility] ([address] [place] [address]);  Service: Endoscopy;  Laterality: N/A;  Ref by NP I. Casper, Eliquis, Tirzepatide, PEG, portal - PC  ok to hold Eliquis 2 days per Dr Palafox-see E consult dated 8/16-GT  10/7 precall complete-confirmed date to stop tirzepatide and Eliquis-RB    HAND SURGERY      30 years ago       Medications:  Medications Ordered Prior to Encounter[1]    Social History: Social History[2]    REVIEW OF SYSTEMS:  Constitution: Negative. Negative for chills, fever and night sweats.   Cardiovascular: Negative for chest pain and syncope.   Respiratory: Negative for cough and shortness of breath.   Gastrointestinal: See HPI. Negative for nausea/vomiting. Negative for abdominal pain.  Genitourinary: See HPI. Negative for discoloration or dysuria.  Skin: Negative for dry skin, itching and rash.    Hematologic/Lymphatic: Negative for bleeding problem. Does not bruise/bleed easily.   Musculoskeletal: Negative for falls and muscle weakness.   Neurological: See HPI. No seizures.   Endocrine: Negative for polydipsia, polyphagia and polyuria.   Allergic/Immunologic: Negative for hives and persistent infections.  Psychiatric/Behavioral: Negative for depression and insomnia.         EXAM:  There were no vitals taken for this visit.    General: The patient is a very pleasant 64 y.o. male in no apparent distress, the patient is oriented to person, place and time.  Psych: Normal mood and affect  HEENT: Vision grossly intact, hearing intact to the spoken word.  Lungs: Respirations unlabored.  Gait: Normal station and gait, no difficulty with toe or heel walk.   Skin: Cervical skin negative for rashes, lesions, hairy patches and surgical scars.  Range of motion: Cervical range of motion is acceptable. There is negative tenderness to palpation.  Spinal Balance: Global saggital and coronal spinal balance acceptable, no significant for scoliosis and kyphosis.  Musculoskeletal: No pain with the range of motion of the bilateral shoulders and elbows. Normal bulk and contour of the bilateral hands.  Vascular: Bilateral hands warm and well perfused, radial pulses 2+ bilaterally.  Neurological: Normal strength and tone in all major motor groups in the bilateral upper and lower extremities. Altered sensation to light touch in the C5-T1 dermatomes LUE compared to RUE  Deep tendon reflexes symmetric 2+ in the bilateral upper and lower extremities.  Negative Inverted Radial Reflex and Fan's bilaterally. Negative Babinski bilaterally.     IMAGING:   Today I personally reviewed AP, Lat and Flex/Ex  upright C-spine films that demonstrate significant degenerative changes with reversal of normal lordosis.     There is no height or weight on file to calculate BMI.    Hemoglobin A1C   Date Value Ref Range Status   10/11/2024 7.1 (H)  4.0 - 5.6 % Final     Comment:     ADA Screening Guidelines:  5.7-6.4%  Consistent with prediabetes  >or=6.5%  Consistent with diabetes    High levels of fetal hemoglobin interfere with the HbA1C  assay. Heterozygous hemoglobin variants (HbS, HgC, etc)do  not significantly interfere with this assay.   However, presence of multiple variants may affect accuracy.     10/07/2024 7.1 (H) 4.0 - 5.6 % Final     Comment:     ADA Screening Guidelines:  5.7-6.4%  Consistent with prediabetes  >or=6.5%  Consistent with diabetes    High levels of fetal hemoglobin interfere with the HbA1C  assay. Heterozygous hemoglobin variants (HbS, HgC, etc)do  not significantly interfere with this assay.   However, presence of multiple variants may affect accuracy.     05/16/2024 6.9 (H) 4.0 - 5.6 % Final     Comment:     ADA Screening Guidelines:  5.7-6.4%  Consistent with prediabetes  >or=6.5%  Consistent with diabetes    High levels of fetal hemoglobin interfere with the HbA1C  assay. Heterozygous hemoglobin variants (HbS, HgC, etc)do  not significantly interfere with this assay.   However, presence of multiple variants may affect accuracy.             ASSESSMENT/PLAN:    There are no diagnoses linked to this encounter.    Today we discussed at length all of the different treatment options including anti-inflammatories, acetaminophen, rest, ice, heat, physical therapy including strengthening and stretching exercises, home exercises, ROM, aerobic conditioning, aqua therapy, other modalities including ultrasound, massage, and dry needling, epidural steroid injections and finally surgical intervention.      Pt presents with left cervical radiculopathy. Will order MRI to further evaluate and send medrol dose pack and gabapentin to pharmacy.         [1]   Current Outpatient Medications on File Prior to Visit   Medication Sig Dispense Refill    apixaban (ELIQUIS) 5 mg Tab TAKE 1 TABLET BY MOUTH TWICE A  tablet 1    apixaban (ELIQUIS) 5 mg  Tab TAKE 1 TABLET BY MOUTH TWICE A  tablet 1    atorvastatin (LIPITOR) 10 MG tablet Take 1 tablet (10 mg total) by mouth once daily. 90 tablet 3    blood-glucose sensor (FREESTYLE STEVAN 3 PLUS SENSOR) Tatiana Place 1 each onto the skin every 14 (fourteen) days. 2 each 11    ciprofloxacin-dexAMETHasone 0.3-0.1% (CIPRODEX) 0.3-0.1 % DrpS Place 4 drops into both ears 2 (two) times daily. 7.5 mL 0    empagliflozin (JARDIANCE) 10 mg tablet Take 1 tablet (10 mg total) by mouth once daily. 90 tablet 2    metoprolol succinate (TOPROL-XL) 50 MG 24 hr tablet Take 1 tablet (50 mg total) by mouth once daily. 30 tablet 11    pantoprazole (PROTONIX) 40 MG tablet Take 1 tablet (40 mg total) by mouth once daily. 90 tablet 3    sacubitriL-valsartan (ENTRESTO)  mg per tablet Take 1 tablet by mouth 2 (two) times daily. 60 tablet 11    spironolactone (ALDACTONE) 25 MG tablet Take 1 tablet (25 mg total) by mouth once daily. 90 tablet 3    tamsulosin (FLOMAX) 0.4 mg Cap Take 1 capsule (0.4 mg total) by mouth once daily. 90 capsule 3    tirzepatide (MOUNJARO) 2.5 mg/0.5 mL PnIj Inject 2.5 mg into the skin every 7 days. 4 Pen 4     No current facility-administered medications on file prior to visit.   [2]   Social History  Socioeconomic History    Marital status:    Occupational History     Employer: [employer]   Tobacco Use    Smoking status: Former     Types: Cigarettes    Smokeless tobacco: Never   Substance and Sexual Activity    Alcohol use: Yes     Comment: rarely    Drug use: No     Social Drivers of Health     Financial Resource Strain: Low Risk  (7/13/2023)    Overall Financial Resource Strain (CARDIA)     Difficulty of Paying Living Expenses: Not hard at all   Food Insecurity: No Food Insecurity (7/13/2023)    Hunger Vital Sign     Worried About Running Out of Food in the Last Year: Never true     Ran Out of Food in the Last Year: Never true   Transportation Needs: No Transportation Needs (7/13/2023)     PRAPARE - Transportation     Lack of Transportation (Medical): No     Lack of Transportation (Non-Medical): No   Physical Activity: Unknown (7/13/2023)    Exercise Vital Sign     Days of Exercise per Week: 4 days   Stress: Stress Concern Present (7/13/2023)    [employer] - Occupational Stress Questionnaire     Feeling of Stress : To some extent   Housing Stability: Low Risk  (7/13/2023)    Housing Stability Vital Sign     Unable to Pay for Housing in the Last Year: No     Number of Places Lived in the Last Year: 1     Unstable Housing in the Last Year: No

## 2025-04-22 ENCOUNTER — HOSPITAL ENCOUNTER (OUTPATIENT)
Dept: RADIOLOGY | Facility: HOSPITAL | Age: 65
Discharge: HOME | End: 2025-04-22
Attending: ORTHOPAEDIC SURGERY
Payer: COMMERCIAL

## 2025-04-22 ENCOUNTER — OFFICE VISIT (OUTPATIENT)
Dept: ORTHOPEDICS | Facility: CLINIC | Age: 65
End: 2025-04-22
Payer: COMMERCIAL

## 2025-04-22 VITALS — BODY MASS INDEX: 29.91 KG/M2 | HEIGHT: 71 IN | WEIGHT: 213.63 LBS

## 2025-04-22 DIAGNOSIS — M50.30 DDD (DEGENERATIVE DISC DISEASE), CERVICAL: ICD-10-CM

## 2025-04-22 DIAGNOSIS — M50.30 DDD (DEGENERATIVE DISC DISEASE), CERVICAL: Primary | ICD-10-CM

## 2025-04-22 PROCEDURE — 72050 X-RAY EXAM NECK SPINE 4/5VWS: CPT | Mod: TC

## 2025-04-22 PROCEDURE — 99204 OFFICE O/P NEW MOD 45 MIN: CPT | Mod: S$GLB,,, | Performed by: ORTHOPAEDIC SURGERY

## 2025-04-22 PROCEDURE — 72050 X-RAY EXAM NECK SPINE 4/5VWS: CPT | Mod: 26,,, | Performed by: RADIOLOGY

## 2025-04-22 PROCEDURE — 3008F BODY MASS INDEX DOCD: CPT | Mod: CPTII,S$GLB,, | Performed by: ORTHOPAEDIC SURGERY

## 2025-04-22 PROCEDURE — 1101F PT FALLS ASSESS-DOCD LE1/YR: CPT | Mod: CPTII,S$GLB,, | Performed by: ORTHOPAEDIC SURGERY

## 2025-04-22 PROCEDURE — 1159F MED LIST DOCD IN RCRD: CPT | Mod: CPTII,S$GLB,, | Performed by: ORTHOPAEDIC SURGERY

## 2025-04-22 PROCEDURE — 3288F FALL RISK ASSESSMENT DOCD: CPT | Mod: CPTII,S$GLB,, | Performed by: ORTHOPAEDIC SURGERY

## 2025-04-22 PROCEDURE — 1125F AMNT PAIN NOTED PAIN PRSNT: CPT | Mod: CPTII,S$GLB,, | Performed by: ORTHOPAEDIC SURGERY

## 2025-04-22 PROCEDURE — 99999 PR PBB SHADOW E&M-EST. PATIENT-LVL III: CPT | Mod: PBBFAC,,, | Performed by: ORTHOPAEDIC SURGERY

## 2025-04-22 PROCEDURE — 4010F ACE/ARB THERAPY RXD/TAKEN: CPT | Mod: CPTII,S$GLB,, | Performed by: ORTHOPAEDIC SURGERY

## 2025-04-22 RX ORDER — GABAPENTIN 300 MG/1
300 CAPSULE ORAL 3 TIMES DAILY
Qty: 90 CAPSULE | Refills: 11 | Status: SHIPPED | OUTPATIENT
Start: 2025-04-22 | End: 2026-04-22

## 2025-04-22 RX ORDER — HYDROCORTISONE 25 MG/G
CREAM TOPICAL 2 TIMES DAILY
Qty: 1 EACH | Refills: 0 | Status: SHIPPED | OUTPATIENT
Start: 2025-04-22

## 2025-04-22 RX ORDER — METHYLPREDNISOLONE 4 MG/1
TABLET ORAL
Qty: 1 EACH | Refills: 0 | Status: SHIPPED | OUTPATIENT
Start: 2025-04-22 | End: 2025-05-13

## 2025-04-22 NOTE — PROGRESS NOTES
The patient location is: home  The chief complaint leading to consultation is: MRI results    Visit type: audiovisual    Face to Face time with patient: 10 min  15 minutes of total time spent on the encounter, which includes face to face time and non-face to face time preparing to see the patient (eg, review of tests), Obtaining and/or reviewing separately obtained history, Documenting clinical information in the electronic or other health record, Independently interpreting results (not separately reported) and communicating results to the patient/family/caregiver, or Care coordination (not separately reported).         Each patient to whom he or she provides medical services by telemedicine is:  (1) informed of the relationship between the physician and patient and the respective role of any other health care provider with respect to management of the patient; and (2) notified that he or she may decline to receive medical services by telemedicine and may withdraw from such care at any time.    Notes: DATE: 4/22/2025  PATIENT: Michael Slaughter Jr.    Attending Physician: Trent Talley M.D.    HISTORY:  Michael Slaughter Jr. is a 64 y.o. male who returns to me today for MRI results.  He was last seen by me 4/22/2025.  Today he is doing well but notes he continues to have neck and left arm pain (Neck - 7, Arm - 7). The pain has been present for a month without injury. The patient describes the pain as radiating. The pain is worse with nothing and improved by nothing and is constant. There is positive associated numbness and tingling. There is positive subjective weakness. Prior treatments have included tylenol and chiropractic rx, but no ESIs or surgery.     The Patient denies myelopathic symptoms such as handwriting changes or difficulty with buttons/coins/keys. Denies perineal paresthesias, bowel/bladder dysfunction.      EXAM:  There were no vitals taken for this visit.    My physical examination was notable for the  following findings:     Musculoskeletal and neuro exam stable    IMAGING:    Today I personally re-reviewed AP, Lat and Flex/Ex  upright C-spine films that demonstrate severe degenerative changes with reversal of normal lordosis.    MRI cervical demonstrates OPLL with mild stenosis and moderate-severe neural foraminal narrowing    There is no height or weight on file to calculate BMI.    Hemoglobin A1C   Date Value Ref Range Status   10/11/2024 7.1 (H) 4.0 - 5.6 % Final     Comment:     ADA Screening Guidelines:  5.7-6.4%  Consistent with prediabetes  >or=6.5%  Consistent with diabetes    High levels of fetal hemoglobin interfere with the HbA1C  assay. Heterozygous hemoglobin variants (HbS, HgC, etc)do  not significantly interfere with this assay.   However, presence of multiple variants may affect accuracy.     10/07/2024 7.1 (H) 4.0 - 5.6 % Final     Comment:     ADA Screening Guidelines:  5.7-6.4%  Consistent with prediabetes  >or=6.5%  Consistent with diabetes    High levels of fetal hemoglobin interfere with the HbA1C  assay. Heterozygous hemoglobin variants (HbS, HgC, etc)do  not significantly interfere with this assay.   However, presence of multiple variants may affect accuracy.     05/16/2024 6.9 (H) 4.0 - 5.6 % Final     Comment:     ADA Screening Guidelines:  5.7-6.4%  Consistent with prediabetes  >or=6.5%  Consistent with diabetes    High levels of fetal hemoglobin interfere with the HbA1C  assay. Heterozygous hemoglobin variants (HbS, HgC, etc)do  not significantly interfere with this assay.   However, presence of multiple variants may affect accuracy.           ASSESSMENT/PLAN:    There are no diagnoses linked to this encounter.    Today we discussed at length all of the different treatment options including anti-inflammatories, acetaminophen, rest, ice, heat, physical therapy including strengthening and stretching exercises, home exercises, ROM, aerobic conditioning, aqua therapy, other modalities  including ultrasound, massage, and dry needling, epidural steroid injections and finally surgical intervention.        Pt presents with chronic left cervical radiculopathy. Failure of conservative rx. Will order left T1-2 TFESI with pain mgmt. Pt will fu if pain persists.

## 2025-05-01 ENCOUNTER — PATIENT MESSAGE (OUTPATIENT)
Dept: ORTHOPEDICS | Facility: CLINIC | Age: 65
End: 2025-05-01

## 2025-05-01 ENCOUNTER — OFFICE VISIT (OUTPATIENT)
Dept: ORTHOPEDICS | Facility: CLINIC | Age: 65
End: 2025-05-01
Payer: COMMERCIAL

## 2025-05-01 ENCOUNTER — HOSPITAL ENCOUNTER (OUTPATIENT)
Dept: RADIOLOGY | Facility: HOSPITAL | Age: 65
Discharge: HOME | End: 2025-05-01
Attending: ORTHOPAEDIC SURGERY
Payer: COMMERCIAL

## 2025-05-01 DIAGNOSIS — I50.42 CHRONIC COMBINED SYSTOLIC AND DIASTOLIC HEART FAILURE: ICD-10-CM

## 2025-05-01 DIAGNOSIS — M50.30 DDD (DEGENERATIVE DISC DISEASE), CERVICAL: Primary | ICD-10-CM

## 2025-05-01 DIAGNOSIS — E11.9 TYPE 2 DIABETES MELLITUS WITHOUT COMPLICATION, WITHOUT LONG-TERM CURRENT USE OF INSULIN: ICD-10-CM

## 2025-05-01 DIAGNOSIS — M50.30 DDD (DEGENERATIVE DISC DISEASE), CERVICAL: ICD-10-CM

## 2025-05-01 PROCEDURE — 72141 MRI NECK SPINE W/O DYE: CPT | Mod: 26,,, | Performed by: RADIOLOGY

## 2025-05-01 PROCEDURE — 72141 MRI NECK SPINE W/O DYE: CPT | Mod: TC

## 2025-05-02 ENCOUNTER — TELEPHONE (OUTPATIENT)
Dept: PAIN MEDICINE | Facility: CLINIC | Age: 65
End: 2025-05-02
Payer: COMMERCIAL

## 2025-05-02 DIAGNOSIS — M54.14 THORACIC RADICULOPATHY: Primary | ICD-10-CM

## 2025-05-02 RX ORDER — SACUBITRIL AND VALSARTAN 97; 103 MG/1; MG/1
1 TABLET, FILM COATED ORAL 2 TIMES DAILY
Qty: 180 TABLET | Refills: 1 | Status: SHIPPED | OUTPATIENT
Start: 2025-05-02

## 2025-05-02 RX ORDER — LIDOCAINE 50 MG/G
1 PATCH TOPICAL DAILY
Qty: 30 PATCH | Refills: 0 | Status: SHIPPED | OUTPATIENT
Start: 2025-05-02

## 2025-05-02 RX ORDER — TIRZEPATIDE 2.5 MG/.5ML
2.5 INJECTION, SOLUTION SUBCUTANEOUS
Qty: 4 PEN | Refills: 4 | Status: SHIPPED | OUTPATIENT
Start: 2025-05-02

## 2025-05-02 NOTE — TELEPHONE ENCOUNTER
----- Message from Anabela Alejandro PA-C sent at 2025  9:31 AM CDT -----  Regarding: Order for RUDDY RANDOLPH JR.    Patient Name: RUDDY RANDOLPH JR.([id number])  Sex: Male  : 1960      PCP: YANET EVANS    Center: [facility] CENTRAL BILLING OFFICE     Types of orders made on 2025: Procedure Request    Order Date:2025  Ordering User:ANABELA ALEJANDRO [[id number]]  Encounter Provider:Anabela Alejandro PA-C [[id number]]  Authorizing Provider: Anabela Alejandro PA-C [[id number]]  Supervising Provider:ED LAWRENCE [[id number]]  Type of Supervision:Supervision Required  Department:[facility][[id number]]    Common Order Information  Procedure -> Epidural Injection (specify level) Cmt: T1-2 MADYSON ( toward the left)    Order Specific Information  Order: Procedure Order to Pain Management [Custom: [id number]]  Order #:          [id number]: 1 FUTURE    Priority: Routine  Class: Clinic Performed    Future Order Information      Expires on:2026            Expected by:2025                   Associated Diagnoses      M50.30 DDD (degenerative disc disease), cervical      Facility Name: -> [facility]           Priority: Routine  Class: Clinic Performed    Future Order Information      Expires on:2026            Expected by:2025                   Associated Diagnoses      M50.30 DDD (degenerative disc disease), cervical      Procedure -> Epidural Injection (specify level) Cmt: T1-2 MADYSON ( toward the                 left)        Facility Name: -> [facility]

## 2025-05-02 NOTE — TELEPHONE ENCOUNTER
----- Message from Anabela Alejandro PA-C sent at 2025  9:31 AM CDT -----  Regarding: Order for RUDDY RANDOLPH JR.    Patient Name: RUDDY RANDOLPH JR.([id number])  Sex: Male  : 1960      PCP: YANET EVANS    Center: [facility] CENTRAL BILLING OFFICE     Types of orders made on 2025: Procedure Request    Order Date:2025  Ordering User:ANABELA ALEJANDRO [[id number]]  Encounter Provider:Anabela Alejandro PA-C [[id number]]  Authorizing Provider: Anabela Alejandro PA-C [[id number]]  Supervising Provider:ED LAWRENCE [[id number]]  Type of Supervision:Supervision Required  Department:[facility][[id number]]    Common Order Information  Procedure -> Epidural Injection (specify level) Cmt: T1-2 MADYSON ( toward the left)    Order Specific Information  Order: Procedure Order to Pain Management [Custom: [id number]]  Order #:          [id number]: 1 FUTURE    Priority: Routine  Class: Clinic Performed    Future Order Information      Expires on:2026            Expected by:2025                   Associated Diagnoses      M50.30 DDD (degenerative disc disease), cervical      Facility Name: -> [facility]           Priority: Routine  Class: Clinic Performed    Future Order Information      Expires on:2026            Expected by:2025                   Associated Diagnoses      M50.30 DDD (degenerative disc disease), cervical      Procedure -> Epidural Injection (specify level) Cmt: T1-2 MADYSON ( toward the                 left)        Facility Name: -> [facility]

## 2025-05-02 NOTE — TELEPHONE ENCOUNTER
Refill Decision Note   Michael Slaughter  is requesting a refill authorization.  Brief Assessment and Rationale for Refill:  Approve     Medication Therapy Plan:         Comments:     Note composed:9:22 AM 05/02/2025

## 2025-05-05 ENCOUNTER — E-CONSULT (OUTPATIENT)
Dept: ELECTROPHYSIOLOGY | Facility: CLINIC | Age: 65
End: 2025-05-05
Payer: COMMERCIAL

## 2025-05-05 ENCOUNTER — TELEPHONE (OUTPATIENT)
Dept: PAIN MEDICINE | Facility: CLINIC | Age: 65
End: 2025-05-05
Payer: COMMERCIAL

## 2025-05-05 DIAGNOSIS — M54.14 THORACIC RADICULOPATHY: Primary | ICD-10-CM

## 2025-05-05 DIAGNOSIS — I48.0 PAROXYSMAL ATRIAL FIBRILLATION: Primary | ICD-10-CM

## 2025-05-05 NOTE — CONSULTS
[facility] - Electrophysiology 3rd Fl  Response for E-Consult     Patient Name: Michael Slaughter Jr.  MRN: [id number]  Primary Care Provider: Daniel Miranda MD   Requesting Provider: Alexandra Guardado DO  Consults    Recommendation: Ok to hold eliquis 3 days prior and resume when safe from a bleeding risk.    Additional future steps to consider: none    Total time of Consultation: 5 minute    I did not speak to the requesting provider verbally about this.     *This eConsult is based on the clinical data available to me and is furnished without benefit of a physical examination. The eConsult will need to be interpreted in light of any clinical issues or changes in patient status not available to me at the time of filing this eConsults. Significant changes in patient condition or level of acuity should result in immediate formal consultation and reevaluation. Please alert me if you have further questions.    Thank you for this eConsult referral.     MD [facility] - Electrophysiology [facility]

## 2025-05-06 ENCOUNTER — TELEPHONE (OUTPATIENT)
Dept: PAIN MEDICINE | Facility: CLINIC | Age: 65
End: 2025-05-06
Payer: COMMERCIAL

## 2025-05-07 ENCOUNTER — PATIENT MESSAGE (OUTPATIENT)
Dept: INTERNAL MEDICINE | Facility: CLINIC | Age: 65
End: 2025-05-07
Payer: COMMERCIAL

## 2025-05-07 ENCOUNTER — TELEPHONE (OUTPATIENT)
Dept: INTERNAL MEDICINE | Facility: CLINIC | Age: 65
End: 2025-05-07
Payer: COMMERCIAL

## 2025-05-07 NOTE — TELEPHONE ENCOUNTER
----- Message from DIANN Landa FNP sent at 5/7/2025 11:09 AM CDT -----  Regarding: audio appt within a week  Book an audio within a week w/ Teri this Thursday Appreciate it

## 2025-05-09 ENCOUNTER — TELEPHONE (OUTPATIENT)
Dept: INTERNAL MEDICINE | Facility: CLINIC | Age: 65
End: 2025-05-09
Payer: COMMERCIAL

## 2025-05-09 ENCOUNTER — OFFICE VISIT (OUTPATIENT)
Dept: INTERNAL MEDICINE | Facility: CLINIC | Age: 65
End: 2025-05-09
Payer: COMMERCIAL

## 2025-05-09 DIAGNOSIS — E11.65 TYPE 2 DIABETES MELLITUS WITH HYPERGLYCEMIA, WITHOUT LONG-TERM CURRENT USE OF INSULIN: Primary | ICD-10-CM

## 2025-05-09 DIAGNOSIS — R53.83 FATIGUE, UNSPECIFIED TYPE: ICD-10-CM

## 2025-05-09 DIAGNOSIS — E66.3 OVERWEIGHT (BMI 25.0-29.9): ICD-10-CM

## 2025-05-09 DIAGNOSIS — Z12.5 PROSTATE CANCER SCREENING: ICD-10-CM

## 2025-05-09 DIAGNOSIS — I10 ESSENTIAL HYPERTENSION: ICD-10-CM

## 2025-05-09 DIAGNOSIS — I50.42 CHRONIC COMBINED SYSTOLIC AND DIASTOLIC HEART FAILURE: ICD-10-CM

## 2025-05-09 DIAGNOSIS — I48.0 PAROXYSMAL ATRIAL FIBRILLATION: ICD-10-CM

## 2025-05-09 NOTE — PROGRESS NOTES
Audio Only Telehealth Visit     The patient location is: home  The chief complaint leading to consultation is: type 2 dm  Visit type: Virtual visit with audio only (telephone)  Total time spent in medical discussion with patient: 5 minutes  Total time spent on date of the encounter:10 minutes    The reason for the audio only service rather than synchronous audio and video virtual visit was related to technical difficulties or patient preference/necessity.     Each patient to whom I provide medical services by telemedicine is:  (1) informed of the relationship between the physician and patient and the respective role of any other health care provider with respect to management of the patient; and (2) notified that they may decline to receive medical services by telemedicine and may withdraw from such care at any time. Patient verbally consented to receive this service via voice-only telephone call.    Patient is willing and able to use the device  Demonstrated an understanding of the technology and is motivated to use CGM  Patient expected to adhere to a comprehensive diabetes treatment plan and patient has adequate medical supervision  Patient experiences  multiple impaired awareness of hypoglycemia (hypoglycemia unawareness)         HPI: type 2 dm  Pending steroid t1-t2   Gabapentin not helpful   Worried about muscle loss    Jardiance 10 mg daily, mounjaro 2.5 mg weekly     Lab Results   Component Value Date    HGBA1C 7.1 (H) 10/11/2024          Assessment and plan:    1. Type 2 diabetes mellitus with hyperglycemia, without long-term current use of insulin  Hemoglobin A1C    Lipid Panel    F/u within the next few weeks  Offer 5/23 2p   Labs next time  A1c goal less than 7.5%  Discussed dietary habits, stressors       2. Overweight (BMI 25.0-29.9)  May increase insulin resistance  On mounjaro      3. Essential hypertension  Microalbumin/Creatinine Ratio, Urine  Bp managed per pcp       4. Chronic combined systolic  and diastolic heart failure  F/u with cards  Stable   On entresto      5. Paroxysmal atrial fibrillation  On eliquis  stable      6. Fatigue, unspecified type  Testosterone,Total    Vitamin D    Vitamin B12    Testosterone,Total  Labs needed       7. Prostate cancer screening  PSA, Screening  Labs needed                  This service was not originating from a related E/M service provided within the previous 7 days nor will  to an E/M service or procedure within the next 24 hours or my soonest available appointment.  Prevailing standard of care was able to be met in this audio-only visit.

## 2025-05-09 NOTE — TELEPHONE ENCOUNTER
----- Message from [place] sent at 5/9/2025 12:06 PM CDT -----  CC Call Back Name Of Caller:Michael ABDALLA   Provider Name: ROBBIN Casper   Does patient feel the need to be seen today?no  Relationship to the Pt?:pt  Contact Preference?: [phone]  What is the nature of the call?: Pt states he has a virtual visit with the provider for 12 and he wants the second number on his chart to be call for the appt.

## 2025-05-12 ENCOUNTER — LAB VISIT (OUTPATIENT)
Dept: LAB | Facility: HOSPITAL | Age: 65
End: 2025-05-12
Payer: COMMERCIAL

## 2025-05-12 ENCOUNTER — PATIENT MESSAGE (OUTPATIENT)
Dept: INTERNAL MEDICINE | Facility: CLINIC | Age: 65
End: 2025-05-12
Payer: COMMERCIAL

## 2025-05-12 DIAGNOSIS — Z12.5 PROSTATE CANCER SCREENING: ICD-10-CM

## 2025-05-12 DIAGNOSIS — N52.9 ERECTILE DYSFUNCTION, UNSPECIFIED ERECTILE DYSFUNCTION TYPE: Primary | ICD-10-CM

## 2025-05-12 DIAGNOSIS — I10 ESSENTIAL HYPERTENSION: ICD-10-CM

## 2025-05-12 DIAGNOSIS — R53.83 FATIGUE, UNSPECIFIED TYPE: ICD-10-CM

## 2025-05-12 DIAGNOSIS — E11.65 TYPE 2 DIABETES MELLITUS WITH HYPERGLYCEMIA, WITHOUT LONG-TERM CURRENT USE OF INSULIN: ICD-10-CM

## 2025-05-12 LAB
25(OH)D3+25(OH)D2 SERPL-MCNC: 38 NG/ML (ref 30–96)
ALBUMIN/CREAT UR: NORMAL
CHOLEST SERPL-MCNC: 156 MG/DL (ref 120–199)
CHOLEST/HDLC SERPL: 3.6 {RATIO} (ref 2–5)
CREAT UR-MCNC: 69 MG/DL (ref 23–375)
EAG (OHS): 197 MG/DL (ref 68–131)
HBA1C MFR BLD: 8.5 % (ref 4–5.6)
HDLC SERPL-MCNC: 43 MG/DL (ref 40–75)
HDLC SERPL: 27.6 % (ref 20–50)
LDLC SERPL CALC-MCNC: 85.8 MG/DL (ref 63–159)
MICROALBUMIN UR-MCNC: <5 UG/ML (ref ?–5000)
NONHDLC SERPL-MCNC: 113 MG/DL
PSA SERPL-MCNC: 0.75 NG/ML
TRIGL SERPL-MCNC: 136 MG/DL (ref 30–150)
VIT B12 SERPL-MCNC: 658 PG/ML (ref 210–950)

## 2025-05-12 PROCEDURE — 36415 COLL VENOUS BLD VENIPUNCTURE: CPT

## 2025-05-12 PROCEDURE — 82607 VITAMIN B-12: CPT

## 2025-05-12 PROCEDURE — 83036 HEMOGLOBIN GLYCOSYLATED A1C: CPT

## 2025-05-12 PROCEDURE — 84153 ASSAY OF PSA TOTAL: CPT

## 2025-05-12 PROCEDURE — 82465 ASSAY BLD/SERUM CHOLESTEROL: CPT

## 2025-05-12 PROCEDURE — 82570 ASSAY OF URINE CREATININE: CPT

## 2025-05-12 PROCEDURE — 82306 VITAMIN D 25 HYDROXY: CPT

## 2025-05-15 ENCOUNTER — PATIENT MESSAGE (OUTPATIENT)
Dept: INTERNAL MEDICINE | Facility: CLINIC | Age: 65
End: 2025-05-15
Payer: COMMERCIAL

## 2025-05-16 ENCOUNTER — TELEPHONE (OUTPATIENT)
Dept: INTERNAL MEDICINE | Facility: CLINIC | Age: 65
End: 2025-05-16
Payer: COMMERCIAL

## 2025-05-16 DIAGNOSIS — N52.9 ERECTILE DYSFUNCTION, UNSPECIFIED ERECTILE DYSFUNCTION TYPE: Primary | ICD-10-CM

## 2025-05-16 NOTE — TELEPHONE ENCOUNTER
----- Message from DIANN Landa FNP sent at 5/16/2025  8:52 AM CDT -----  Regarding: testosterone ,total -bernadette  Hi!Call and rescheduleThanksIrielle

## 2025-05-23 ENCOUNTER — OFFICE VISIT (OUTPATIENT)
Dept: INTERNAL MEDICINE | Facility: CLINIC | Age: 65
End: 2025-05-23
Payer: COMMERCIAL

## 2025-05-23 ENCOUNTER — PATIENT MESSAGE (OUTPATIENT)
Dept: INTERNAL MEDICINE | Facility: CLINIC | Age: 65
End: 2025-05-23

## 2025-05-23 ENCOUNTER — TELEPHONE (OUTPATIENT)
Dept: INTERNAL MEDICINE | Facility: CLINIC | Age: 65
End: 2025-05-23
Payer: COMMERCIAL

## 2025-05-23 DIAGNOSIS — I48.0 PAROXYSMAL ATRIAL FIBRILLATION: ICD-10-CM

## 2025-05-23 DIAGNOSIS — I50.42 CHRONIC COMBINED SYSTOLIC AND DIASTOLIC HEART FAILURE: ICD-10-CM

## 2025-05-23 DIAGNOSIS — E66.3 OVERWEIGHT (BMI 25.0-29.9): ICD-10-CM

## 2025-05-23 DIAGNOSIS — E11.65 TYPE 2 DIABETES MELLITUS WITH HYPERGLYCEMIA, WITHOUT LONG-TERM CURRENT USE OF INSULIN: Primary | ICD-10-CM

## 2025-05-23 DIAGNOSIS — R53.83 FATIGUE, UNSPECIFIED TYPE: Primary | ICD-10-CM

## 2025-05-23 DIAGNOSIS — I10 ESSENTIAL HYPERTENSION: ICD-10-CM

## 2025-05-23 DIAGNOSIS — R53.83 OTHER FATIGUE: ICD-10-CM

## 2025-05-23 PROCEDURE — 1160F RVW MEDS BY RX/DR IN RCRD: CPT | Mod: CPTII,93,, | Performed by: NURSE PRACTITIONER

## 2025-05-23 PROCEDURE — 1159F MED LIST DOCD IN RCRD: CPT | Mod: CPTII,93,, | Performed by: NURSE PRACTITIONER

## 2025-05-23 PROCEDURE — 98016 BRIEF COMUNICAJ TECH-BSD SVC: CPT | Mod: 93,,, | Performed by: NURSE PRACTITIONER

## 2025-05-23 PROCEDURE — 3052F HG A1C>EQUAL 8.0%<EQUAL 9.0%: CPT | Mod: CPTII,93,, | Performed by: NURSE PRACTITIONER

## 2025-05-23 PROCEDURE — 4010F ACE/ARB THERAPY RXD/TAKEN: CPT | Mod: CPTII,93,, | Performed by: NURSE PRACTITIONER

## 2025-05-23 PROCEDURE — 3061F NEG MICROALBUMINURIA REV: CPT | Mod: CPTII,93,, | Performed by: NURSE PRACTITIONER

## 2025-05-23 PROCEDURE — 3066F NEPHROPATHY DOC TX: CPT | Mod: CPTII,93,, | Performed by: NURSE PRACTITIONER

## 2025-05-23 NOTE — TELEPHONE ENCOUNTER
----- Message from Isabel sent at 5/23/2025  8:49 AM CDT -----  Contact: [phone] Patient  .1MEDICALADVICE Patient is calling for Medical Advice regarding: Pt in need of virtual or audio call instead of in person appt today at 2pm due to transportation issues - going out of town this weekend.How long has patient had these symptoms:Pharmacy name and phone#:[facility] call back or [facility]: call back Comments:Please advise patient replies from provider may take up to 48 hours.

## 2025-05-25 PROBLEM — R53.83 FATIGUE: Status: ACTIVE | Noted: 2025-05-25

## 2025-05-25 RX ORDER — TIRZEPATIDE 5 MG/.5ML
5 INJECTION, SOLUTION SUBCUTANEOUS
Qty: 4 PEN | Refills: 6 | Status: SHIPPED | OUTPATIENT
Start: 2025-05-25

## 2025-05-25 NOTE — PROGRESS NOTES
Audio Only Telehealth Visit     The patient location is: home   The chief complaint leading to consultation is: type 2 diabetes   Visit type: Virtual visit with audio only (telephone)  Total time spent in medical discussion with patient: 5 minutes  Total time spent on date of the encounter: 20 minutes       The reason for the audio only service rather than synchronous audio and video virtual visit was related to technical difficulties or patient preference/necessity.       Each patient to whom I provide medical services by telemedicine is:  (1) informed of the relationship between the physician and patient and the respective role of any other health care provider with respect to management of the patient; and (2) notified that they may decline to receive medical services by telemedicine and may withdraw from such care at any time. Patient verbally consented to receive this service via voice-only telephone call.       HPI: type 2 diabetes      Admits to less walking/activity  Feeling more fatigue  Worried about testosterone level   Currently on mounjaro 2.5 mg weekly and jardiance 10 mg daily  Rigo 3 use  History of hypoglycemia < 54 mg/dl    Assessment and plan:    1. Type 2 diabetes mellitus with hyperglycemia, without long-term current use of insulin  tirzepatide (MOUNJARO) 5 mg/0.5 mL PnIj  Increase glp1a/gip  Continue jardiance 10 mg daily  Continue rigo 3 use   A1c goal less than 7.5%  Lab Results   Component Value Date    HGBA1C 8.5 (H) 05/12/2025     Discussed exercise, watching portions, affects of stress   F/u in sept, labs 9/12       2. Paroxysmal atrial fibrillation  On eliquis  Stable   F/u with cards       3. Overweight (BMI 25.0-29.9)  May increase insulin resistance   On glp1a/gip       4. Essential hypertension  Bp managed per pcp  On entresto  On sglt2i       5. Chronic combined systolic and diastolic heart failure  F/u with cards  On bb, entresto      6. Other fatigue  Testosterone order placed  for 5/27 Tuesday here at [facility]-messaged staff                                This service was not originating from a related E/M service provided within the previous 7 days nor will  to an E/M service or procedure within the next 24 hours or my soonest available appointment.  Prevailing standard of care was able to be met in this audio-only visit.

## 2025-06-09 ENCOUNTER — PATIENT OUTREACH (OUTPATIENT)
Dept: ADMINISTRATIVE | Facility: HOSPITAL | Age: 65
End: 2025-06-09
Payer: COMMERCIAL

## 2025-06-09 NOTE — PROGRESS NOTES
Chart reviewed and updated. Reconciled immunizations, health maintenance and care everywhere.  Sent portal msg to schedule aeye appt.      Joseline Dempsey CMA  Panel Care Coordinator  [facility]  [phone]  For Daniel Miranda MD

## 2025-06-24 DIAGNOSIS — K21.9 GASTROESOPHAGEAL REFLUX DISEASE, UNSPECIFIED WHETHER ESOPHAGITIS PRESENT: ICD-10-CM

## 2025-06-24 RX ORDER — PANTOPRAZOLE SODIUM 40 MG/1
40 TABLET, DELAYED RELEASE ORAL DAILY
Qty: 90 TABLET | Refills: 0 | Status: SHIPPED | OUTPATIENT
Start: 2025-06-24

## 2025-06-24 NOTE — TELEPHONE ENCOUNTER
Refill Routing Note   Medication(s) are not appropriate for processing by [facility] for the following reason(s):        ED/Hospital Visit since last OV with provider    [facility] action(s):  Defer      Medication Therapy Plan: MULTIPLE  ED VISITS SINCE LOV      Appointments  past 12m or future 3m with PCP    Date Provider   Last Visit   7/9/2024 Daniel Miranda MD   Next Visit   Visit date not found Daniel Miranda MD   ED visits in past 90 days: 0        Note composed:9:23 AM 06/24/2025

## 2025-06-24 NOTE — TELEPHONE ENCOUNTER
No care due was identified.  [facility] Embedded Care Due Messages. Reference number: [id number].   6/24/2025 5:08:23 AM CDT

## 2025-07-02 ENCOUNTER — PATIENT OUTREACH (OUTPATIENT)
Dept: INTERNAL MEDICINE | Facility: CLINIC | Age: 65
End: 2025-07-02
Payer: COMMERCIAL

## 2025-07-02 ENCOUNTER — PATIENT OUTREACH (OUTPATIENT)
Dept: ADMINISTRATIVE | Facility: HOSPITAL | Age: 65
End: 2025-07-02
Payer: COMMERCIAL

## 2025-07-02 DIAGNOSIS — E11.65 TYPE 2 DIABETES MELLITUS WITH HYPERGLYCEMIA, WITHOUT LONG-TERM CURRENT USE OF INSULIN: Primary | ICD-10-CM

## 2025-07-02 DIAGNOSIS — E11.9 TYPE 2 DIABETES MELLITUS WITHOUT COMPLICATION, WITHOUT LONG-TERM CURRENT USE OF INSULIN: ICD-10-CM

## 2025-07-02 NOTE — TELEPHONE ENCOUNTER
No care due was identified.  [facility] Embedded Care Due Messages. Reference number: [id number].   7/02/2025 9:55:48 AM CDT

## 2025-07-03 RX ORDER — METOPROLOL SUCCINATE 50 MG/1
50 TABLET, EXTENDED RELEASE ORAL DAILY
Qty: 30 TABLET | Refills: 0 | Status: SHIPPED | OUTPATIENT
Start: 2025-07-03

## 2025-07-03 RX ORDER — ATORVASTATIN CALCIUM 10 MG/1
10 TABLET, FILM COATED ORAL DAILY
Qty: 30 TABLET | Refills: 0 | Status: SHIPPED | OUTPATIENT
Start: 2025-07-03

## 2025-07-21 ENCOUNTER — PATIENT MESSAGE (OUTPATIENT)
Dept: INTERNAL MEDICINE | Facility: CLINIC | Age: 65
End: 2025-07-21
Payer: COMMERCIAL

## 2025-07-21 DIAGNOSIS — E34.9 TESTOSTERONE DEFICIENCY: Primary | ICD-10-CM

## 2025-07-22 ENCOUNTER — CLINICAL SUPPORT (OUTPATIENT)
Dept: INTERNAL MEDICINE | Facility: CLINIC | Age: 65
End: 2025-07-22
Attending: FAMILY MEDICINE
Payer: COMMERCIAL

## 2025-07-22 ENCOUNTER — OFFICE VISIT (OUTPATIENT)
Dept: INTERNAL MEDICINE | Facility: CLINIC | Age: 65
End: 2025-07-22
Payer: COMMERCIAL

## 2025-07-22 VITALS
HEART RATE: 95 BPM | HEIGHT: 71 IN | OXYGEN SATURATION: 98 % | SYSTOLIC BLOOD PRESSURE: 122 MMHG | BODY MASS INDEX: 31.39 KG/M2 | DIASTOLIC BLOOD PRESSURE: 76 MMHG | WEIGHT: 224.19 LBS

## 2025-07-22 DIAGNOSIS — K21.9 GASTROESOPHAGEAL REFLUX DISEASE WITHOUT ESOPHAGITIS: ICD-10-CM

## 2025-07-22 DIAGNOSIS — Z12.11 ENCOUNTER FOR SCREENING FOR MALIGNANT NEOPLASM OF COLON: ICD-10-CM

## 2025-07-22 DIAGNOSIS — E66.811 CLASS 1 OBESITY DUE TO EXCESS CALORIES WITH SERIOUS COMORBIDITY AND BODY MASS INDEX (BMI) OF 31.0 TO 31.9 IN ADULT: ICD-10-CM

## 2025-07-22 DIAGNOSIS — E11.65 TYPE 2 DIABETES MELLITUS WITH HYPERGLYCEMIA, WITHOUT LONG-TERM CURRENT USE OF INSULIN: ICD-10-CM

## 2025-07-22 DIAGNOSIS — Z79.01 ANTICOAGULATED: ICD-10-CM

## 2025-07-22 DIAGNOSIS — Z12.5 ENCOUNTER FOR SCREENING FOR MALIGNANT NEOPLASM OF PROSTATE: ICD-10-CM

## 2025-07-22 DIAGNOSIS — I10 ESSENTIAL HYPERTENSION: ICD-10-CM

## 2025-07-22 DIAGNOSIS — N40.1 BENIGN PROSTATIC HYPERPLASIA WITH NOCTURIA: ICD-10-CM

## 2025-07-22 DIAGNOSIS — I48.0 PAROXYSMAL ATRIAL FIBRILLATION: ICD-10-CM

## 2025-07-22 DIAGNOSIS — I50.42 CHRONIC COMBINED SYSTOLIC AND DIASTOLIC HEART FAILURE: ICD-10-CM

## 2025-07-22 DIAGNOSIS — Z00.00 ENCOUNTER FOR ANNUAL GENERAL MEDICAL EXAMINATION WITHOUT ABNORMAL FINDINGS IN ADULT: Primary | ICD-10-CM

## 2025-07-22 DIAGNOSIS — E66.09 CLASS 1 OBESITY DUE TO EXCESS CALORIES WITH SERIOUS COMORBIDITY AND BODY MASS INDEX (BMI) OF 31.0 TO 31.9 IN ADULT: ICD-10-CM

## 2025-07-22 DIAGNOSIS — R35.1 BENIGN PROSTATIC HYPERPLASIA WITH NOCTURIA: ICD-10-CM

## 2025-07-22 LAB — HM EYE EXAM: POSITIVE

## 2025-07-22 PROCEDURE — 3066F NEPHROPATHY DOC TX: CPT | Mod: CPTII,S$GLB,, | Performed by: FAMILY MEDICINE

## 2025-07-22 PROCEDURE — 1126F AMNT PAIN NOTED NONE PRSNT: CPT | Mod: CPTII,S$GLB,, | Performed by: FAMILY MEDICINE

## 2025-07-22 PROCEDURE — 4010F ACE/ARB THERAPY RXD/TAKEN: CPT | Mod: CPTII,S$GLB,, | Performed by: FAMILY MEDICINE

## 2025-07-22 PROCEDURE — 1101F PT FALLS ASSESS-DOCD LE1/YR: CPT | Mod: CPTII,S$GLB,, | Performed by: FAMILY MEDICINE

## 2025-07-22 PROCEDURE — 92229 IMG RTA DETC/MNTR DS POC ALY: CPT | Mod: S$GLB,,, | Performed by: FAMILY MEDICINE

## 2025-07-22 PROCEDURE — 3052F HG A1C>EQUAL 8.0%<EQUAL 9.0%: CPT | Mod: CPTII,S$GLB,, | Performed by: FAMILY MEDICINE

## 2025-07-22 PROCEDURE — 3074F SYST BP LT 130 MM HG: CPT | Mod: CPTII,S$GLB,, | Performed by: FAMILY MEDICINE

## 2025-07-22 PROCEDURE — 3078F DIAST BP <80 MM HG: CPT | Mod: CPTII,S$GLB,, | Performed by: FAMILY MEDICINE

## 2025-07-22 PROCEDURE — 3008F BODY MASS INDEX DOCD: CPT | Mod: CPTII,S$GLB,, | Performed by: FAMILY MEDICINE

## 2025-07-22 PROCEDURE — 99999 PR PBB SHADOW E&M-EST. PATIENT-LVL IV: CPT | Mod: PBBFAC,,, | Performed by: FAMILY MEDICINE

## 2025-07-22 PROCEDURE — 3288F FALL RISK ASSESSMENT DOCD: CPT | Mod: CPTII,S$GLB,, | Performed by: FAMILY MEDICINE

## 2025-07-22 PROCEDURE — 3061F NEG MICROALBUMINURIA REV: CPT | Mod: CPTII,S$GLB,, | Performed by: FAMILY MEDICINE

## 2025-07-22 PROCEDURE — 1160F RVW MEDS BY RX/DR IN RCRD: CPT | Mod: CPTII,S$GLB,, | Performed by: FAMILY MEDICINE

## 2025-07-22 PROCEDURE — 1159F MED LIST DOCD IN RCRD: CPT | Mod: CPTII,S$GLB,, | Performed by: FAMILY MEDICINE

## 2025-07-22 PROCEDURE — 99397 PER PM REEVAL EST PAT 65+ YR: CPT | Mod: S$GLB,,, | Performed by: FAMILY MEDICINE

## 2025-07-22 RX ORDER — METOPROLOL SUCCINATE 50 MG/1
50 TABLET, EXTENDED RELEASE ORAL DAILY
Qty: 90 TABLET | Refills: 3 | Status: SHIPPED | OUTPATIENT
Start: 2025-07-22 | End: 2026-07-17

## 2025-07-22 NOTE — PROGRESS NOTES
[facility]  Annual Exam/Wellness Visit    Patient Information  Michael Slaughter Jr.  64 y.o. male    PCP  Daniel Miranda MD    Reason for Visit  Annual Exam and Fatigue      Subjective:  History of Present Illness    CHIEF COMPLAINT:  64-year-old male presents today with fatigue.    He reports testosterone levels were ordered yesterday to evaluate potential low testosterone. He is taking Entresto and Eliquis daily instead of prescribed twice daily for each. He denies experiencing ankle swelling while taking modified medication regimen.    He has a history of pAF. He last saw cardiology approximately 6 months ago. He understands the arrhythmia was unpredictable in nature and expresses hesitation about long-term medication management for this condition.    He experiences tingling sensation when lying on his sides, with varying intensity depending on positioning. 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 He has an upcoming appointment for an injection. He understands the condition is chronic and prefers conservative management to avoid surgical intervention.          Health Maintenance         Date Due Completion Date    COVID-19 Vaccine (6 - 2024-25 season) 09/01/2024 12/13/2023    Hemoglobin A1c 08/12/2025 5/12/2025    Influenza Vaccine (1) 09/01/2025 10/11/2024    Override on 9/17/2019: Done    PROSTATE-SPECIFIC ANTIGEN 05/12/2026 5/12/2025    Diabetes Urine Screening 05/12/2026 5/12/2025    Lipid Panel 05/12/2026 5/12/2025    Low Dose Statin 07/22/2026 7/22/2025    Foot Exam 07/22/2026 7/22/2025    Override on 9/26/2022: Done    Override on 4/21/2021: Done    Override on 9/17/2019: Done (done today)    Diabetic Eye Exam 07/22/2026 7/22/2025    TETANUS VACCINE 09/17/2029 9/17/2019    Override on 1/1/2010: Done (had in 2010 per pt)    Colorectal Cancer Screening 10/14/2034 10/14/2024            Review of Systems   Constitutional:   "Positive for fatigue. Negative for chills and fever.   HENT:  Negative for nasal congestion, ear pain, postnasal drip and sore throat.    Eyes:  Negative for visual disturbance.   Respiratory:  Negative for cough, shortness of breath and wheezing.    Cardiovascular:  Negative for chest pain and palpitations.   Gastrointestinal:  Negative for abdominal pain, change in bowel habit, constipation, diarrhea, nausea and vomiting.   Genitourinary:  Negative for dysuria and hematuria.   Musculoskeletal:  Positive for back pain. Negative for leg pain and neck pain.   Neurological:  Negative for dizziness, weakness, numbness and headaches.   Hematological:  Negative for adenopathy.   Psychiatric/Behavioral:  Negative for dysphoric mood, sleep disturbance and suicidal ideas. The patient is not nervous/anxious.         Patient answers are not available for this visit.      Problem List and History  Problem List[1]  Past Medical History[2]  Past Surgical History[3]  Social History     Substance and Sexual Activity   Sexual Activity Not on file     Tobacco Use History[4]  Social History     Substance and Sexual Activity   Alcohol Use Yes    Comment: rarely     Social History     Substance and Sexual Activity   Drug Use No       Medication List  Current Medications[5]    Objective:  Vitals:    07/22/25 1355   BP: 122/76   Pulse: 95   SpO2: 98%   Weight: 101.7 kg (224 lb 3.3 oz)   Height: 5' 11" (1.803 m)       Physical Exam  Vitals reviewed.   Constitutional:       General: He is not in acute distress.     Appearance: Normal appearance. He is obese. He is not ill-appearing.   HENT:      Head: Normocephalic and atraumatic.      Right Ear: Tympanic membrane, ear canal and external ear normal. There is no impacted cerumen.      Left Ear: Tympanic membrane, ear canal and external ear normal. There is no impacted cerumen.      Nose: Nose normal. No congestion or rhinorrhea.      Mouth/Throat:      Mouth: Mucous membranes are moist.     "  Pharynx: Oropharynx is clear. No oropharyngeal exudate or posterior oropharyngeal erythema.   Eyes:      General: No scleral icterus.        Right eye: No discharge.         Left eye: No discharge.      Conjunctiva/sclera: Conjunctivae normal.   Neck:      Thyroid: No thyroid mass, thyromegaly or thyroid tenderness.   Cardiovascular:      Rate and Rhythm: Normal rate and regular rhythm.      Pulses: Normal pulses.           Dorsalis pedis pulses are 2+ on the right side and 2+ on the left side.        Posterior tibial pulses are 2+ on the right side and 2+ on the left side.      Heart sounds: Normal heart sounds. No murmur heard.     No friction rub. No gallop.   Pulmonary:      Effort: Pulmonary effort is normal. No respiratory distress.      Breath sounds: Normal breath sounds. No stridor. No wheezing, rhonchi or rales.   Abdominal:      General: Abdomen is flat. Bowel sounds are normal. There is no distension.      Palpations: Abdomen is soft. There is no mass.      Tenderness: There is no abdominal tenderness. There is no guarding.      Hernia: No hernia is present.   Musculoskeletal:         General: No swelling or deformity. Normal range of motion.      Cervical back: Normal range of motion and neck supple. No tenderness or bony tenderness.      Thoracic back: No bony tenderness.      Lumbar back: No bony tenderness.        Back:    Feet:      Right foot:      Protective Sensation: 5 sites tested.  5 sites sensed.      Left foot:      Protective Sensation: 5 sites tested.  5 sites sensed.   Lymphadenopathy:      Cervical: No cervical adenopathy.   Skin:     General: Skin is warm and dry.   Neurological:      General: No focal deficit present.      Mental Status: He is alert and oriented to person, place, and time. Mental status is at baseline.      Gait: Gait normal.      Deep Tendon Reflexes: Reflexes normal.   Psychiatric:         Mood and Affect: Mood normal.         Behavior: Behavior normal.          Thought Content: Thought content normal.         Judgment: Judgment normal.         Lab Results  CBC  Lab Results   Component Value Date    WBC 7.15 05/16/2024    RBC 6.07 05/16/2024    HGB 16.4 05/16/2024    HCT 50.3 05/16/2024    MCV 83 05/16/2024    MCH 27.0 05/16/2024    MCHC 32.6 05/16/2024    RDW 14.4 05/16/2024     05/16/2024    MPV 11.8 05/16/2024    GRAN 4.7 05/16/2024    GRAN 66.1 05/16/2024    LYMPH 1.6 05/16/2024    LYMPH 22.1 05/16/2024    MONO 0.7 05/16/2024    MONO 9.1 05/16/2024    EOS 0.1 05/16/2024    BASO 0.05 05/16/2024    EOSINOPHIL 1.7 05/16/2024    BASOPHIL 0.7 05/16/2024       CMP    Chemistry        Component Value Date/Time     10/11/2024 0848    K 4.4 10/11/2024 0848     10/11/2024 0848    CO2 25 10/11/2024 0848    BUN 17 10/11/2024 0848    CREATININE 1.2 10/11/2024 0848     (H) 10/11/2024 0848        Component Value Date/Time    CALCIUM 9.9 10/11/2024 0848    ALKPHOS 49 (L) 10/11/2024 0848    AST 14 10/11/2024 0848    ALT 19 10/11/2024 0848    BILITOT 0.9 10/11/2024 0848    ESTGFRAFRICA >60.0 03/23/2022 0940    EGFRNONAA >60.0 03/23/2022 0940            Lipids  Lab Results   Component Value Date    CHOL 156 05/12/2025    CHOL 139 05/16/2024    CHOL 161 09/25/2023      Lab Results   Component Value Date    HDL 43 05/12/2025    HDL 35 (L) 05/16/2024    HDL 42 09/25/2023     Lab Results   Component Value Date    LDLCALC 85.8 05/12/2025    LDLCALC 71.0 05/16/2024    LDLCALC 88.2 09/25/2023      Lab Results   Component Value Date    TRIG 136 05/12/2025    TRIG 165 (H) 05/16/2024    TRIG 154 (H) 09/25/2023     Lab Results   Component Value Date    TOTALCHOLEST 3.6 05/12/2025    TOTALCHOLEST 4.0 05/16/2024    TOTALCHOLEST 3.8 09/25/2023     Lab Results   Component Value Date    NONHDLCHOL 113 05/12/2025    NONHDLCHOL 104 05/16/2024    NONHDLCHOL 119 09/25/2023     Lab Results   Component Value Date    CHOLHDL 27.6 05/12/2025    CHOLHDL 25.2 05/16/2024    CHOLHDL 26.1  09/25/2023       Thyroid Function  Lab Results   Component Value Date    TSH 1.317 05/16/2024    FREET4 0.90 05/16/2024         Diabetes Screen  Lab Results   Component Value Date    HGBA1C 8.5 (H) 05/12/2025       Other Labs  Prostate Specific Antigen (ng/mL)   Date Value   05/12/2025 0.75     PSA, Screen (ng/mL)   Date Value   05/16/2024 0.87     PSA Total (ng/mL)   Date Value   10/15/2018 0.45     PSA, Free (ng/mL)   Date Value   10/15/2018 0.21     PSA, Free % (%)   Date Value   10/15/2018 46.67     Lab Results   Component Value Date    JAR10LZGS Negative 12/14/2020     Lab Results   Component Value Date    HEPCAB Negative 06/08/2016     Vitamin D   Date/Time Value Ref Range Status   05/12/2025 08:37 AM 38 30 - 96 ng/mL Final     Comment:     Vitamin D deficiency.........<10 ng/mL                              Vitamin D insufficiency......10-29 ng/mL       Vitamin D sufficiency........> or equal to 30 ng/mL  Vitamin D toxicity............>100 ng/mL         Assessment and Plan:    ICD-10-CM ICD-9-CM   1. Encounter for annual general medical examination without abnormal findings in adult  Z00.00 V70.0   2. Type 2 diabetes mellitus with hyperglycemia, without long-term current use of insulin  E11.65 250.00     790.29   3. Class 1 obesity due to excess calories with serious comorbidity and body mass index (BMI) of 31.0 to 31.9 in adult  E66.811 278.00    E66.09 V85.31    Z68.31    4. Gastroesophageal reflux disease without esophagitis  K21.9 530.81   5. Anticoagulated  Z79.01 V58.61   6. Benign prostatic hyperplasia with nocturia  N40.1 600.01    R35.1 788.43   7. Essential hypertension  I10 401.9   8. Chronic combined systolic and diastolic heart failure  I50.42 428.42   9. Paroxysmal atrial fibrillation  I48.0 427.31   10. Encounter for screening for malignant neoplasm of colon  Z12.11 V76.51   11. Encounter for screening for malignant neoplasm of prostate  Z12.5 V76.44     Orders Placed This Encounter     Comprehensive Metabolic Panel    CBC Auto Differential    Lipid Panel    Hemoglobin A1C    TSH    T4, Free    HM DIABETES FOOT EXAM    metoprolol succinate (TOPROL-XL) 50 MG 24 hr tablet       Assessment & Plan    - Reviewed spinal MRI results showing arthritis at C7-T1 causing nerve compression and tingling symptoms.  - Agreed with conservative management approach, recommending nerve injections and possible nerve ablation before considering surgery.  - Performed diabetic foot exam.    1. Encounter for annual general medical examination without abnormal findings in adult (Primary)  Health maintenance updated  Chronic issues reviewed  Fasting labs ordered  - Comprehensive Metabolic Panel; Future  - CBC Auto Differential; Future  - Lipid Panel; Future  - Hemoglobin A1C; Future  - TSH; Future  - T4, Free; Future    2. Type 2 diabetes mellitus with hyperglycemia, without long-term current use of insulin  Chronic  worsened. Will f/u new labs now that rx was adjusted  uncontrolled  - Comprehensive Metabolic Panel; Future  - Lipid Panel; Future  - Hemoglobin A1C; Future  - HM DIABETES FOOT EXAM    3. Class 1 obesity due to excess calories with serious comorbidity and body mass index (BMI) of 31.0 to 31.9 in adult  Encourage healthy diet and exercise habits to optimize health and minimize chance of comorbidity development  Comorbid T2DM, HTN, HLP    4. Gastroesophageal reflux disease without esophagitis  Chronic  stable  controlled    5. Anticoagulated  Advised to take eliquis BID as directed to prevent pAF-associated clot formation    6. Benign prostatic hyperplasia with nocturia  Chronic  stable  controlled    7. Essential hypertension  Chronic  stable  controlled  - metoprolol succinate (TOPROL-XL) 50 MG 24 hr tablet; Take 1 tablet (50 mg total) by mouth once daily.  Dispense: 90 tablet; Refill: 3    8. Chronic combined systolic and diastolic heart failure  Chronic  stable  controlled    9. Paroxysmal atrial  fibrillation  Chronic  stable  controlled  - metoprolol succinate (TOPROL-XL) 50 MG 24 hr tablet; Take 1 tablet (50 mg total) by mouth once daily.  Dispense: 90 tablet; Refill: 3    10. Encounter for screening for malignant neoplasm of colon  UTD    11. Encounter for screening for malignant neoplasm of prostate  WNL on last check in May 2025      --  Advised patient that normal lab and imaging results would not be communicated automatically by the provider unless otherwise stated/noted. I will be in touch if there are changes that need to be made to the treatment plan.  --    Check-Out:  Follow-Up  Follow up in about 6 months (around 1/22/2026) for Diabetic Visit.    Upcoming Appointments  Future Appointments   Date Time Provider Department Center   7/31/2025  7:50 AM LAB, APPOINTMENT [facility]   7/31/2025  8:30 AM Isaac Medina MD [facility] OPHTHAL [address]   9/12/2025  8:00 AM LAB, APPOINTMENT [facility] [address] [facility]   9/12/2025  8:10 AM LAB, APPOINTMENT [facility] [address]   9/19/2025 11:30 AM Oliver Casper APRN, [facility] [address] [facility]         Daniel Miranda MD, [facility]  Family Medicine Physician  [facility]  7/22/2025      This note was generated with the assistance of ambient listening technology. Verbal consent was obtained by the patient and accompanying visitor(s) for the recording of patient appointment to facilitate this note. I attest to having reviewed and edited the generated note for accuracy, though some syntax or spelling errors may persist. Please contact the author of this note for any clarification.                                 [1]   Patient Active Problem List  Diagnosis    Essential hypertension    Type 2 diabetes mellitus with hyperglycemia, without long-term current use of insulin    GERD (gastroesophageal reflux disease)    ED (erectile dysfunction)    Colon cancer screening    Paroxysmal atrial  fibrillation    Chronic combined systolic and diastolic heart failure    Anticoagulated    Frequent PVCs    Nonsustained ventricular tachycardia    Class 1 obesity due to excess calories with serious comorbidity and body mass index (BMI) of 31.0 to 31.9 in adult    Benign prostatic hyperplasia with nocturia    Fatigue   [2]   Past Medical History:  Diagnosis Date    Diabetes mellitus type II     GERD (gastroesophageal reflux disease)     Sinusitis    [3]   Past Surgical History:  Procedure Laterality Date    COLONOSCOPY      COLONOSCOPY N/A 10/14/2024    Procedure: COLONOSCOPY;  Surgeon: Trent Sanderson MD;  Location: [facility] ([address] [place] [address]);  Service: Endoscopy;  Laterality: N/A;  Ref by NP I. Casper, Eliquis, Tirzepatide, PEG, portal - PC  ok to hold Eliquis 2 days per Dr Palafox-see E consult dated 8/16-GT  10/7 precall complete-confirmed date to stop tirzepatide and Eliquis-RB    HAND SURGERY      30 years ago   [4]   Social History  Tobacco Use   Smoking Status Former    Types: Cigarettes   Smokeless Tobacco Never   [5]   Current Outpatient Medications:     apixaban (ELIQUIS) 5 mg Tab, TAKE 1 TABLET BY MOUTH TWICE A DAY, Disp: 180 tablet, Rfl: 1    atorvastatin (LIPITOR) 10 MG tablet, Take 1 tablet (10 mg total) by mouth once daily., Disp: 30 tablet, Rfl: 0    empagliflozin (JARDIANCE) 10 mg tablet, Take 1 tablet (10 mg total) by mouth once daily., Disp: 90 tablet, Rfl: 2    pantoprazole (PROTONIX) 40 MG tablet, Take 1 tablet (40 mg total) by mouth once daily., Disp: 90 tablet, Rfl: 0    sacubitriL-valsartan (ENTRESTO)  mg per tablet, Take 1 tablet by mouth 2 (two) times daily., Disp: 180 tablet, Rfl: 1    spironolactone (ALDACTONE) 25 MG tablet, Take 1 tablet (25 mg total) by mouth once daily., Disp: 90 tablet, Rfl: 3    tamsulosin (FLOMAX) 0.4 mg Cap, Take 1 capsule (0.4 mg total) by mouth once daily., Disp: 90 capsule, Rfl: 3    tirzepatide (MOUNJARO) 5 mg/0.5 mL PnIj, Inject 5 mg into the skin every 7  days., Disp: 4 Pen, Rfl: 6    apixaban (ELIQUIS) 5 mg Tab, TAKE 1 TABLET BY MOUTH TWICE A DAY, Disp: 180 tablet, Rfl: 1    blood-glucose sensor (FREESTYLE STEVAN 3 PLUS SENSOR) Tatiana, Place 1 each onto the skin every 14 (fourteen) days. (Patient not taking: Reported on 7/22/2025), Disp: 2 each, Rfl: 11    ciprofloxacin-dexAMETHasone 0.3-0.1% (CIPRODEX) 0.3-0.1 % DrpS, Place 4 drops into both ears 2 (two) times daily. (Patient not taking: Reported on 7/22/2025), Disp: 7.5 mL, Rfl: 0    hydrocortisone 2.5 % cream, Apply topically 2 (two) times daily. (Patient not taking: Reported on 7/22/2025), Disp: 1 each, Rfl: 0    LIDOcaine (LIDODERM) 5 %, Place 1 patch onto the skin once daily. Remove & Discard patch within 12 hours or as directed by MD (Patient not taking: Reported on 7/22/2025), Disp: 30 patch, Rfl: 0    metoprolol succinate (TOPROL-XL) 50 MG 24 hr tablet, Take 1 tablet (50 mg total) by mouth once daily., Disp: 90 tablet, Rfl: 3

## 2025-07-31 ENCOUNTER — PATIENT MESSAGE (OUTPATIENT)
Dept: INTERNAL MEDICINE | Facility: CLINIC | Age: 65
End: 2025-07-31
Payer: COMMERCIAL

## 2025-07-31 ENCOUNTER — OFFICE VISIT (OUTPATIENT)
Dept: OPHTHALMOLOGY | Facility: CLINIC | Age: 65
End: 2025-07-31
Payer: COMMERCIAL

## 2025-07-31 ENCOUNTER — PATIENT MESSAGE (OUTPATIENT)
Dept: OPTOMETRY | Facility: CLINIC | Age: 65
End: 2025-07-31
Payer: COMMERCIAL

## 2025-07-31 ENCOUNTER — CLINICAL SUPPORT (OUTPATIENT)
Dept: OPHTHALMOLOGY | Facility: CLINIC | Age: 65
End: 2025-07-31
Payer: COMMERCIAL

## 2025-07-31 ENCOUNTER — LAB VISIT (OUTPATIENT)
Dept: LAB | Facility: HOSPITAL | Age: 65
End: 2025-07-31
Payer: COMMERCIAL

## 2025-07-31 DIAGNOSIS — E11.3293 MILD NONPROLIFERATIVE DIABETIC RETINOPATHY OF BOTH EYES WITHOUT MACULAR EDEMA ASSOCIATED WITH TYPE 2 DIABETES MELLITUS: Primary | ICD-10-CM

## 2025-07-31 DIAGNOSIS — H02.59 FLOPPY EYELID SYNDROME OF BOTH EYES: ICD-10-CM

## 2025-07-31 DIAGNOSIS — H43.813 VITREOUS DEGENERATION OF BOTH EYES: ICD-10-CM

## 2025-07-31 DIAGNOSIS — E11.65 TYPE 2 DIABETES MELLITUS WITH HYPERGLYCEMIA, WITHOUT LONG-TERM CURRENT USE OF INSULIN: ICD-10-CM

## 2025-07-31 DIAGNOSIS — R79.89 LOW TESTOSTERONE IN MALE: Primary | ICD-10-CM

## 2025-07-31 DIAGNOSIS — Z00.00 ENCOUNTER FOR ANNUAL GENERAL MEDICAL EXAMINATION WITHOUT ABNORMAL FINDINGS IN ADULT: ICD-10-CM

## 2025-07-31 DIAGNOSIS — R53.83 FATIGUE, UNSPECIFIED TYPE: ICD-10-CM

## 2025-07-31 DIAGNOSIS — H25.13 AGE-RELATED NUCLEAR CATARACT OF BOTH EYES: ICD-10-CM

## 2025-07-31 LAB
ABSOLUTE EOSINOPHIL (OHS): 0.11 K/UL
ABSOLUTE MONOCYTE (OHS): 0.68 K/UL (ref 0.3–1)
ABSOLUTE NEUTROPHIL COUNT (OHS): 3.94 K/UL (ref 1.8–7.7)
ALBUMIN SERPL BCP-MCNC: 4.1 G/DL (ref 3.5–5.2)
ALP SERPL-CCNC: 55 UNIT/L (ref 40–150)
ALT SERPL W/O P-5'-P-CCNC: 31 UNIT/L (ref 0–55)
ANION GAP (OHS): 10 MMOL/L (ref 8–16)
AST SERPL-CCNC: 29 UNIT/L (ref 0–50)
BASOPHILS # BLD AUTO: 0.05 K/UL
BASOPHILS NFR BLD AUTO: 0.8 %
BILIRUB SERPL-MCNC: 0.7 MG/DL (ref 0.1–1)
BUN SERPL-MCNC: 16 MG/DL (ref 8–23)
CALCIUM SERPL-MCNC: 9.9 MG/DL (ref 8.7–10.5)
CHLORIDE SERPL-SCNC: 107 MMOL/L (ref 95–110)
CHOLEST SERPL-MCNC: 149 MG/DL (ref 120–199)
CHOLEST/HDLC SERPL: 3.6 {RATIO} (ref 2–5)
CO2 SERPL-SCNC: 24 MMOL/L (ref 23–29)
CREAT SERPL-MCNC: 1.3 MG/DL (ref 0.5–1.4)
EAG (OHS): 209 MG/DL (ref 68–131)
ERYTHROCYTE [DISTWIDTH] IN BLOOD BY AUTOMATED COUNT: 13.8 % (ref 11.5–14.5)
GFR SERPLBLD CREATININE-BSD FMLA CKD-EPI: >60 ML/MIN/1.73/M2
GLUCOSE SERPL-MCNC: 149 MG/DL (ref 70–110)
HBA1C MFR BLD: 8.9 % (ref 4–5.6)
HCT VFR BLD AUTO: 48.4 % (ref 40–54)
HDLC SERPL-MCNC: 41 MG/DL (ref 40–75)
HDLC SERPL: 27.5 % (ref 20–50)
HGB BLD-MCNC: 15.7 GM/DL (ref 14–18)
IMM GRANULOCYTES # BLD AUTO: 0.01 K/UL (ref 0–0.04)
IMM GRANULOCYTES NFR BLD AUTO: 0.2 % (ref 0–0.5)
LDLC SERPL CALC-MCNC: 77 MG/DL (ref 63–159)
LYMPHOCYTES # BLD AUTO: 1.51 K/UL (ref 1–4.8)
MCH RBC QN AUTO: 26.9 PG (ref 27–31)
MCHC RBC AUTO-ENTMCNC: 32.4 G/DL (ref 32–36)
MCV RBC AUTO: 83 FL (ref 82–98)
NONHDLC SERPL-MCNC: 108 MG/DL
NUCLEATED RBC (/100WBC) (OHS): 0 /100 WBC
PLATELET # BLD AUTO: 238 K/UL (ref 150–450)
PMV BLD AUTO: 11.5 FL (ref 9.2–12.9)
POTASSIUM SERPL-SCNC: 4.7 MMOL/L (ref 3.5–5.1)
PROT SERPL-MCNC: 7.1 GM/DL (ref 6–8.4)
RBC # BLD AUTO: 5.84 M/UL (ref 4.6–6.2)
RELATIVE EOSINOPHIL (OHS): 1.7 %
RELATIVE LYMPHOCYTE (OHS): 24 % (ref 18–48)
RELATIVE MONOCYTE (OHS): 10.8 % (ref 4–15)
RELATIVE NEUTROPHIL (OHS): 62.5 % (ref 38–73)
SODIUM SERPL-SCNC: 141 MMOL/L (ref 136–145)
T4 FREE SERPL-MCNC: 1 NG/DL (ref 0.71–1.51)
TESTOST SERPL-MCNC: 439 NG/DL (ref 304–1227)
TRIGL SERPL-MCNC: 155 MG/DL (ref 30–150)
TSH SERPL-ACNC: 1.63 UIU/ML (ref 0.4–4)
WBC # BLD AUTO: 6.3 K/UL (ref 3.9–12.7)

## 2025-07-31 PROCEDURE — 84443 ASSAY THYROID STIM HORMONE: CPT

## 2025-07-31 PROCEDURE — 80061 LIPID PANEL: CPT

## 2025-07-31 PROCEDURE — 83036 HEMOGLOBIN GLYCOSYLATED A1C: CPT

## 2025-07-31 PROCEDURE — 84439 ASSAY OF FREE THYROXINE: CPT

## 2025-07-31 PROCEDURE — 80053 COMPREHEN METABOLIC PANEL: CPT

## 2025-07-31 PROCEDURE — 99999 PR PBB SHADOW E&M-EST. PATIENT-LVL III: CPT | Mod: PBBFAC,,, | Performed by: OPHTHALMOLOGY

## 2025-07-31 PROCEDURE — 36415 COLL VENOUS BLD VENIPUNCTURE: CPT

## 2025-07-31 PROCEDURE — 84403 ASSAY OF TOTAL TESTOSTERONE: CPT

## 2025-07-31 PROCEDURE — 85025 COMPLETE CBC W/AUTO DIFF WBC: CPT

## 2025-07-31 NOTE — PROGRESS NOTES
HPI    Here for retinal eval Abnormal diabetic cam  [facility] O[facility] decreased for distance gradually. Lines become doubled at time blue   ray glasses help  Eye Meds None  Last edited by Sangeetha Khan on 7/31/2025  8:18 AM.         A/P    ICD-10-CM ICD-9-CM   1. Mild nonproliferative diabetic retinopathy of both eyes without macular edema associated with type 2 diabetes mellitus  E11.3293 250.50     362.04   2. Age-related nuclear cataract of both eyes  H25.13 366.16   3. Vitreous degeneration of both eyes  H43.813 379.21   4. Floppy eyelid syndrome of both eyes  H02.59 374.89       1. Mild nonproliferative diabetic retinopathy of both eyes without macular edema associated with type 2 diabetes mellitus  PCP Daniel Miranda MD - Recent notes reviewed  05/12/2025  8.5  A1C   Here for retina check    Exam notable for mild DR, dry no NV  Plan: Observation  Recommend good blood pressure control, tight blood glucose control, and good cholesterol control     2. Age-related nuclear cataract of both eyes  Mild NS, NVS  Plan: Observation Update Mrx prn     3. Vitreous degeneration of both eyes  No RT/RD  Plan: Observation      4. Floppy eyelid syndrome of both eyes  Mod floppy eyelids, pt does snore  Plan: will refer to PCP for sleep study eval    RTC Optom Mrx, me prn         I saw and examined the patient and reviewed in detail the findings documented. The final examination findings, image interpretations which have been independently interpreted, and plan as documented in the record represent my personal judgment and conclusions.    Isaac Medina MD  Vitreoretinal Surgery   [facility]

## 2025-08-19 DIAGNOSIS — E11.9 TYPE 2 DIABETES MELLITUS WITHOUT COMPLICATION, WITHOUT LONG-TERM CURRENT USE OF INSULIN: ICD-10-CM

## 2025-08-19 RX ORDER — ATORVASTATIN CALCIUM 10 MG/1
10 TABLET, FILM COATED ORAL DAILY
Qty: 90 TABLET | Refills: 3 | Status: SHIPPED | OUTPATIENT
Start: 2025-08-19